# Patient Record
Sex: FEMALE | Race: WHITE
[De-identification: names, ages, dates, MRNs, and addresses within clinical notes are randomized per-mention and may not be internally consistent; named-entity substitution may affect disease eponyms.]

---

## 2017-12-17 ENCOUNTER — HOSPITAL ENCOUNTER (INPATIENT)
Dept: HOSPITAL 17 - NEPE | Age: 77
LOS: 11 days | Discharge: PSYCHIATRIC OR SUBSTANCE USE FACILITY | DRG: 853 | End: 2017-12-28
Attending: HOSPITALIST | Admitting: HOSPITALIST
Payer: MEDICARE

## 2017-12-17 VITALS — OXYGEN SATURATION: 95 %

## 2017-12-17 VITALS
TEMPERATURE: 98.2 F | RESPIRATION RATE: 18 BRPM | DIASTOLIC BLOOD PRESSURE: 93 MMHG | HEART RATE: 112 BPM | SYSTOLIC BLOOD PRESSURE: 147 MMHG | OXYGEN SATURATION: 97 %

## 2017-12-17 VITALS
DIASTOLIC BLOOD PRESSURE: 92 MMHG | SYSTOLIC BLOOD PRESSURE: 175 MMHG | OXYGEN SATURATION: 94 % | HEART RATE: 109 BPM | TEMPERATURE: 98.2 F | RESPIRATION RATE: 16 BRPM

## 2017-12-17 VITALS
HEART RATE: 90 BPM | RESPIRATION RATE: 18 BRPM | OXYGEN SATURATION: 96 % | DIASTOLIC BLOOD PRESSURE: 84 MMHG | SYSTOLIC BLOOD PRESSURE: 155 MMHG | TEMPERATURE: 97.1 F

## 2017-12-17 VITALS
DIASTOLIC BLOOD PRESSURE: 68 MMHG | OXYGEN SATURATION: 96 % | HEART RATE: 86 BPM | RESPIRATION RATE: 20 BRPM | SYSTOLIC BLOOD PRESSURE: 144 MMHG

## 2017-12-17 VITALS — WEIGHT: 121.92 LBS | HEIGHT: 65 IN | BODY MASS INDEX: 20.31 KG/M2

## 2017-12-17 VITALS
SYSTOLIC BLOOD PRESSURE: 140 MMHG | HEART RATE: 91 BPM | DIASTOLIC BLOOD PRESSURE: 95 MMHG | OXYGEN SATURATION: 99 % | TEMPERATURE: 98.3 F | RESPIRATION RATE: 17 BRPM

## 2017-12-17 VITALS — HEART RATE: 103 BPM

## 2017-12-17 DIAGNOSIS — R53.1: ICD-10-CM

## 2017-12-17 DIAGNOSIS — I25.5: ICD-10-CM

## 2017-12-17 DIAGNOSIS — J18.9: ICD-10-CM

## 2017-12-17 DIAGNOSIS — I70.8: ICD-10-CM

## 2017-12-17 DIAGNOSIS — N39.0: ICD-10-CM

## 2017-12-17 DIAGNOSIS — I48.91: ICD-10-CM

## 2017-12-17 DIAGNOSIS — R41.0: ICD-10-CM

## 2017-12-17 DIAGNOSIS — I70.203: ICD-10-CM

## 2017-12-17 DIAGNOSIS — I51.3: ICD-10-CM

## 2017-12-17 DIAGNOSIS — F54: ICD-10-CM

## 2017-12-17 DIAGNOSIS — E78.5: ICD-10-CM

## 2017-12-17 DIAGNOSIS — I74.5: ICD-10-CM

## 2017-12-17 DIAGNOSIS — I10: ICD-10-CM

## 2017-12-17 DIAGNOSIS — I70.0: ICD-10-CM

## 2017-12-17 DIAGNOSIS — I74.3: ICD-10-CM

## 2017-12-17 DIAGNOSIS — I25.10: ICD-10-CM

## 2017-12-17 DIAGNOSIS — Z87.891: ICD-10-CM

## 2017-12-17 DIAGNOSIS — I25.82: ICD-10-CM

## 2017-12-17 DIAGNOSIS — E88.09: ICD-10-CM

## 2017-12-17 DIAGNOSIS — E87.6: ICD-10-CM

## 2017-12-17 DIAGNOSIS — D75.1: ICD-10-CM

## 2017-12-17 DIAGNOSIS — I49.3: ICD-10-CM

## 2017-12-17 DIAGNOSIS — R26.81: ICD-10-CM

## 2017-12-17 DIAGNOSIS — Z86.73: ICD-10-CM

## 2017-12-17 DIAGNOSIS — B96.20: ICD-10-CM

## 2017-12-17 DIAGNOSIS — R09.02: ICD-10-CM

## 2017-12-17 DIAGNOSIS — A41.9: Primary | ICD-10-CM

## 2017-12-17 DIAGNOSIS — L98.8: ICD-10-CM

## 2017-12-17 DIAGNOSIS — E86.0: ICD-10-CM

## 2017-12-17 LAB
ALBUMIN SERPL-MCNC: 2.3 GM/DL (ref 3.4–5)
ALP SERPL-CCNC: 100 U/L (ref 45–117)
ALT SERPL-CCNC: 9 U/L (ref 10–53)
AMORPHOUS SEDIMENT, URINE: (no result)
AST SERPL-CCNC: 18 U/L (ref 15–37)
BACTERIA #/AREA URNS HPF: (no result) /HPF
BASOPHILS # BLD AUTO: 0 TH/MM3 (ref 0–0.2)
BASOPHILS NFR BLD: 0.1 % (ref 0–2)
BILIRUB SERPL-MCNC: 0.9 MG/DL (ref 0.2–1)
BUN SERPL-MCNC: 11 MG/DL (ref 7–18)
CALCIUM SERPL-MCNC: 8.1 MG/DL (ref 8.5–10.1)
CHLORIDE SERPL-SCNC: 102 MEQ/L (ref 98–107)
COLOR UR: (no result)
CREAT SERPL-MCNC: 0.73 MG/DL (ref 0.5–1)
EOSINOPHIL # BLD: 0.2 TH/MM3 (ref 0–0.4)
EOSINOPHIL NFR BLD: 0.9 % (ref 0–4)
ERYTHROCYTE [DISTWIDTH] IN BLOOD BY AUTOMATED COUNT: 18.8 % (ref 11.6–17.2)
GFR SERPLBLD BASED ON 1.73 SQ M-ARVRAT: 78 ML/MIN (ref 89–?)
GLUCOSE SERPL-MCNC: 88 MG/DL (ref 74–106)
GLUCOSE UR STRIP-MCNC: (no result) MG/DL
HCO3 BLD-SCNC: 27.9 MEQ/L (ref 21–32)
HCT VFR BLD CALC: 59.6 % (ref 35–46)
HGB BLD-MCNC: 19.3 GM/DL (ref 11.6–15.3)
HGB UR QL STRIP: (no result)
INR PPP: 1.7 RATIO
KETONES UR STRIP-MCNC: 10 MG/DL
LYMPHOCYTES # BLD AUTO: 0.7 TH/MM3 (ref 1–4.8)
LYMPHOCYTES NFR BLD AUTO: 3.1 % (ref 9–44)
MAGNESIUM SERPL-MCNC: 1.9 MG/DL (ref 1.5–2.5)
MCH RBC QN AUTO: 22.3 PG (ref 27–34)
MCHC RBC AUTO-ENTMCNC: 32.4 % (ref 32–36)
MCV RBC AUTO: 68.8 FL (ref 80–100)
MONOCYTE #: 1.2 TH/MM3 (ref 0–0.9)
MONOCYTES NFR BLD: 5.4 % (ref 0–8)
MUCOUS THREADS #/AREA URNS LPF: (no result) /LPF
NEUTROPHILS # BLD AUTO: 21 TH/MM3 (ref 1.8–7.7)
NEUTROPHILS NFR BLD AUTO: 90.5 % (ref 16–70)
NITRITE UR QL STRIP: (no result)
PLATELET # BLD: 675 TH/MM3 (ref 150–450)
PMV BLD AUTO: 8.2 FL (ref 7–11)
PROT SERPL-MCNC: 7.1 GM/DL (ref 6.4–8.2)
PROTHROMBIN TIME: 17.5 SEC (ref 9.8–11.6)
RBC # BLD AUTO: 8.36 MIL/MM3 (ref 4–5.3)
SODIUM SERPL-SCNC: 136 MEQ/L (ref 136–145)
SP GR UR STRIP: 1.02 (ref 1–1.03)
SQUAMOUS #/AREA URNS HPF: 2 /HPF (ref 0–5)
URINE LEUKOCYTE ESTERASE: (no result)
WBC # BLD AUTO: 23.2 TH/MM3 (ref 4–11)
WBC TOXIC VACUOLES BLD QL SMEAR: PRESENT

## 2017-12-17 PROCEDURE — 96361 HYDRATE IV INFUSION ADD-ON: CPT

## 2017-12-17 PROCEDURE — 71020: CPT

## 2017-12-17 PROCEDURE — 93005 ELECTROCARDIOGRAM TRACING: CPT

## 2017-12-17 PROCEDURE — 87040 BLOOD CULTURE FOR BACTERIA: CPT

## 2017-12-17 PROCEDURE — 78452 HT MUSCLE IMAGE SPECT MULT: CPT

## 2017-12-17 PROCEDURE — C1768 GRAFT, VASCULAR: HCPCS

## 2017-12-17 PROCEDURE — 86900 BLOOD TYPING SEROLOGIC ABO: CPT

## 2017-12-17 PROCEDURE — 84100 ASSAY OF PHOSPHORUS: CPT

## 2017-12-17 PROCEDURE — C1757 CATH, THROMBECTOMY/EMBOLECT: HCPCS

## 2017-12-17 PROCEDURE — 85007 BL SMEAR W/DIFF WBC COUNT: CPT

## 2017-12-17 PROCEDURE — 94668 MNPJ CHEST WALL SBSQ: CPT

## 2017-12-17 PROCEDURE — 99152 MOD SED SAME PHYS/QHP 5/>YRS: CPT

## 2017-12-17 PROCEDURE — 96365 THER/PROPH/DIAG IV INF INIT: CPT

## 2017-12-17 PROCEDURE — 85002 BLEEDING TIME TEST: CPT

## 2017-12-17 PROCEDURE — 75635 CT ANGIO ABDOMINAL ARTERIES: CPT

## 2017-12-17 PROCEDURE — 83735 ASSAY OF MAGNESIUM: CPT

## 2017-12-17 PROCEDURE — C1769 GUIDE WIRE: HCPCS

## 2017-12-17 PROCEDURE — 87186 SC STD MICRODIL/AGAR DIL: CPT

## 2017-12-17 PROCEDURE — 94664 DEMO&/EVAL PT USE INHALER: CPT

## 2017-12-17 PROCEDURE — 87086 URINE CULTURE/COLONY COUNT: CPT

## 2017-12-17 PROCEDURE — 80202 ASSAY OF VANCOMYCIN: CPT

## 2017-12-17 PROCEDURE — C1893 INTRO/SHEATH, FIXED,NON-PEEL: HCPCS

## 2017-12-17 PROCEDURE — 81001 URINALYSIS AUTO W/SCOPE: CPT

## 2017-12-17 PROCEDURE — 86920 COMPATIBILITY TEST SPIN: CPT

## 2017-12-17 PROCEDURE — 93970 EXTREMITY STUDY: CPT

## 2017-12-17 PROCEDURE — 93306 TTE W/DOPPLER COMPLETE: CPT

## 2017-12-17 PROCEDURE — 83880 ASSAY OF NATRIURETIC PEPTIDE: CPT

## 2017-12-17 PROCEDURE — 84439 ASSAY OF FREE THYROXINE: CPT

## 2017-12-17 PROCEDURE — 85027 COMPLETE CBC AUTOMATED: CPT

## 2017-12-17 PROCEDURE — 84132 ASSAY OF SERUM POTASSIUM: CPT

## 2017-12-17 PROCEDURE — 80061 LIPID PANEL: CPT

## 2017-12-17 PROCEDURE — 80048 BASIC METABOLIC PNL TOTAL CA: CPT

## 2017-12-17 PROCEDURE — 80053 COMPREHEN METABOLIC PANEL: CPT

## 2017-12-17 PROCEDURE — 83036 HEMOGLOBIN GLYCOSYLATED A1C: CPT

## 2017-12-17 PROCEDURE — 76937 US GUIDE VASCULAR ACCESS: CPT

## 2017-12-17 PROCEDURE — P9045 ALBUMIN (HUMAN), 5%, 250 ML: HCPCS

## 2017-12-17 PROCEDURE — 86850 RBC ANTIBODY SCREEN: CPT

## 2017-12-17 PROCEDURE — 94640 AIRWAY INHALATION TREATMENT: CPT

## 2017-12-17 PROCEDURE — 86901 BLOOD TYPING SEROLOGIC RH(D): CPT

## 2017-12-17 PROCEDURE — 93017 CV STRESS TEST TRACING ONLY: CPT

## 2017-12-17 PROCEDURE — 88311 DECALCIFY TISSUE: CPT

## 2017-12-17 PROCEDURE — 71010: CPT

## 2017-12-17 PROCEDURE — 70450 CT HEAD/BRAIN W/O DYE: CPT

## 2017-12-17 PROCEDURE — 85610 PROTHROMBIN TIME: CPT

## 2017-12-17 PROCEDURE — 82550 ASSAY OF CK (CPK): CPT

## 2017-12-17 PROCEDURE — 85025 COMPLETE CBC W/AUTO DIFF WBC: CPT

## 2017-12-17 PROCEDURE — A9502 TC99M TETROFOSMIN: HCPCS

## 2017-12-17 PROCEDURE — 96367 TX/PROPH/DG ADDL SEQ IV INF: CPT

## 2017-12-17 PROCEDURE — 83605 ASSAY OF LACTIC ACID: CPT

## 2017-12-17 PROCEDURE — 84443 ASSAY THYROID STIM HORMONE: CPT

## 2017-12-17 PROCEDURE — 93458 L HRT ARTERY/VENTRICLE ANGIO: CPT

## 2017-12-17 PROCEDURE — 87077 CULTURE AEROBIC IDENTIFY: CPT

## 2017-12-17 PROCEDURE — 87449 NOS EACH ORGANISM AG IA: CPT

## 2017-12-17 PROCEDURE — 88304 TISSUE EXAM BY PATHOLOGIST: CPT

## 2017-12-17 PROCEDURE — 85730 THROMBOPLASTIN TIME PARTIAL: CPT

## 2017-12-17 PROCEDURE — 82805 BLOOD GASES W/O2 SATURATION: CPT

## 2017-12-17 PROCEDURE — 36600 WITHDRAWAL OF ARTERIAL BLOOD: CPT

## 2017-12-17 RX ADMIN — Medication SCH ML: at 22:06

## 2017-12-17 RX ADMIN — STANDARDIZED SENNA CONCENTRATE AND DOCUSATE SODIUM SCH TAB: 8.6; 5 TABLET, FILM COATED ORAL at 22:05

## 2017-12-17 RX ADMIN — SODIUM CHLORIDE SCH MLS/HR: 900 INJECTION INTRAVENOUS at 17:25

## 2017-12-17 NOTE — HHI.HP
__________________________________________________





[facility]


Service


[facility]


Primary Care Physician


No Primary Care Physician


Admission Diagnosis





sepsis/uti


Diagnoses:  


Chief Complaint:  


generalized weakness


Travel History


International Travel<30 Days:  No


Contact w/Intl Traveler <30 Da:  No


Traveled to Known Affected Are:  No


History of Present Illness


Patient is 77-year-old female with PMH of HTN, HLD brought into the emergency 

department via EMS for evaluation of generalized weakness.  Patient is a poor 

historian. Per their report she has been laying on her couch for the last 7 

days unable to get up because she has been weak.  Apparently either patient or 

daughter called 911.  Patient states that she has not been cleaned in several 

days.  Patient states she has not been eating well.  EMS reported that living 

conditions were suboptimal at best.  Patient smelled of feces and urine on 

arrival. Further work up reveals UTI  with sepsis. Started on abb after 

cultures obtained. 


Patient also was noted with discolored LE bluish discoloration, cold LE , and 

no pulses detected by US. Ao run off ordered and also vascular surgeon 

consulted for further eval.





Review of Systems


ROS Limitations:  Clinical Condition, Poor Historian


Except as stated in HPI:  all other systems reviewed are Neg





Past Family Social History


Past Medical History


HLD, HTN


Past Surgical History


Tubal ligation 1982


Tonsillectomy as a child








Reported Medications





Reported Meds & Active Scripts


Active


Allergies:  


Coded Allergies:  


     No Known Allergies (Unverified  Allergy, Unknown, 17)


Family History


None


Social History


Tobacco: 1/2 ppd since 20 yrs of age, says she stopped in her "70's


Alcohol: 2 beers every couple weeks


Drugs: Marijuana in her 20's





Physical Exam


Vital Signs





Vital Signs








  Date Time  Temp Pulse Resp B/P (MAP) Pulse Ox O2 Delivery O2 Flow Rate FiO2


 


17 16:00 97.1 90 18 155/84 (107) 96   


 


17 15:49        


 


17 15:00  86 20 144/68 (93) 96 Room Air  


 


17 14:07 98.3 91 17 140/95 (110) 99 Room Air  


 


17 13:02     95 Room Air  


 


17 12:03     99 Room Air  


 


17 12:03  111 18  99 Room Air  


 


17 11:51 98.2 112 18 147/93 (111 97   








Physical Exam


GENERAL: This is a skinny 78 yo f,  well-nourished, well-developed patient, in 

no apparent distress.


SKIN: BL LE toes with cyanosis, cold and no pulses detected. Cool and dry.


HEAD: Atraumatic. Normocephalic. No temporal or scalp tenderness.


EYES: Pupils equal round and reactive. Extraocular motions intact. No scleral 

icterus. No injection or drainage. 


ENT: Nose without bleeding, purulent drainage or septal hematoma. Throat 

without erythema, tonsillar hypertrophy or exudate. Uvula midline. Airway 

patent.


NECK: Trachea midline. No JVD or lymphadenopathy. Supple, nontender, no 

meningeal signs.


CARDIOVASCULAR: Regular rate and rhythm without murmurs, gallops, or rubs. 


RESPIRATORY: Clear to auscultation. Breath sounds equal bilaterally. No wheezes

, rales, or rhonchi.  


GASTROINTESTINAL: Abdomen soft, non-tender, nondistended. No hepato-splenomegaly

, or palpable masses. No guarding.


MUSCULOSKELETAL: Extremities without clubbing, cyanosis, or edema. No joint 

tenderness, effusion, or edema noted. No calf tenderness. Negative Homans sign 

bilaterally.


NEUROLOGICAL: Awake and alert. Cranial nerves II through XII intact.  Motor and 

sensory grossly within normal limits. Five out of 5 muscle strength in all 

muscle groups.  Normal speech.


Laboratory





Laboratory Tests








Test


  17


12:00 17


12:05 17


13:20


 


Urine Color LIGHT-RED   


 


Urine Turbidity HAZY   


 


Urine pH 5.5   


 


Urine Specific Gravity 1.024   


 


Urine Protein 30   


 


Urine Glucose (UA) NEG   


 


Urine Ketones 10   


 


Urine Occult Blood NEG   


 


Urine Nitrite POS   


 


Urine Bilirubin NEG   


 


Urine Urobilinogen 2.0   


 


Urine Leukocyte Esterase MOD   


 


Urine RBC 3   


 


Urine WBC 21   


 


Urine Squamous Epithelial


Cells 2 


  


  


 


 


Urine Amorphous Sediment OCC   


 


Urine Bacteria MANY   


 


Urine Mucus MANY   


 


Microscopic Urinalysis Comment


  CATH-CULTURE


IND 


  


 


 


White Blood Count  23.2  


 


Red Blood Count  8.36  


 


Hemoglobin  19.3  


 


Hematocrit  59.6  


 


Mean Corpuscular Volume  68.8  


 


Mean Corpuscular Hemoglobin  22.3  


 


Mean Corpuscular Hemoglobin


Concent 


  32.4 


  


 


 


Red Cell Distribution Width  18.8  


 


Platelet Count  675  


 


Mean Platelet Volume  8.2  


 


Neutrophils (%) (Auto)  90.5  


 


Lymphocytes (%) (Auto)  3.1  


 


Monocytes (%) (Auto)  5.4  


 


Eosinophils (%) (Auto)  0.9  


 


Basophils (%) (Auto)  0.1  


 


Neutrophils # (Auto)  21.0  


 


Lymphocytes # (Auto)  0.7  


 


Monocytes # (Auto)  1.2  


 


Eosinophils # (Auto)  0.2  


 


Basophils # (Auto)  0.0  


 


CBC Comment  AUTO DIFF  


 


Differential Comment


  


  AUTO DIFF


CONFIRMED 


 


 


Toxic Vacuolation  PRESENT  


 


Platelet Estimate  HIGH  


 


Platelet Morphology Comment  ENLARGED  


 


Prothrombin Time  17.5  


 


Prothromb Time International


Ratio 


  1.7 


  


 


 


Activated Partial


Thromboplast Time 


  29.9 


  


 


 


Lactic Acid Level  1.3  


 


Blood Urea Nitrogen   11 


 


Creatinine   0.73 


 


Random Glucose   88 


 


Total Protein   7.1 


 


Albumin   2.3 


 


Calcium Level   8.1 


 


Magnesium Level   1.9 


 


Alkaline Phosphatase   100 


 


Aspartate Amino Transf


(AST/SGOT) 


  


  18 


 


 


Alanine Aminotransferase


(ALT/SGPT) 


  


  9 


 


 


Total Bilirubin   0.9 


 


Sodium Level   136 


 


Potassium Level   3.6 


 


Chloride Level   102 


 


Carbon Dioxide Level   27.9 


 


Anion Gap   6 


 


Estimat Glomerular Filtration


Rate 


  


  78 


 


 


Total Creatine Kinase   81 














 Date/Time


Source Procedure


Growth Status


 


 


 17 12:20


Blood Peripheral Aerobic Blood Culture


Pending Received


 


 17 12:20


Blood Peripheral Anaerobic Blood Culture


Pending Received


 


 17 12:00


Urine Catheterized Urine Urine Culture


Pending Received








Result Diagram:  


17 1205                                                                  

              17 1320





Imaging





Last Impressions








Chest X-Ray 17 1157 Signed





Impressions: 





 Service Date/Time:  2017 13:26 - CONCLUSION:  No acute 





 cardiopulmonary disease.     Tomas Doty MD 











Caprini VTE Risk Assessment


Caprini VTE Risk Assessment:  Mod/High Risk (score >= 2)


Caprini Risk Assessment Model











 Point Value = 1          Point Value = 2  Point Value = 3  Point Value = 5


 


Age 41-60


Minor surgery


BMI > 25 kg/m2


Swollen legs


Varicose veins


Pregnancy or postpartum


History of unexplained or recurrent


   spontaneous 


Oral contraceptives or hormone


   replacement


Sepsis (< 1 month)


Serious lung disease, including


   pneumonia (< 1 month)


Abnormal pulmonary function


Acute myocardial infarction


Congestive heart failure (< 1 month)


History of inflammatory bowel disease


Medical patient at bed rest Age 61-74


Arthroscopic surgery


Major open surgery (> 45 min)


Laparoscopic surgery (> 45 min)


Malignancy


Confined to bed (> 72 hours)


Immobilizing plaster cast


Central venous access Age >= 75


History of VTE


Family history of VTE


Factor V Leiden


Prothrombin 92636L


Lupus anticoagulant


Anticardiolipin antibodies


Elevated serum homocysteine


Heparin-induced thrombocytopenia


Other congenital or acquired


   thrombophilia Stroke (< 1 month)


Elective arthroplasty


Hip, pelvis, or leg fracture


Acute spinal cord injury (< 1 month)








Prophylaxis Regimen











   Total Risk


Factor Score Risk Level Prophylaxis Regimen


 


0-1      Low Early ambulation


 


2 Moderate Order ONE of the following:


*Sequential Compression Device (SCD)


*Heparin 5000 units SQ BID


 


3-4 Higher Order ONE of the following medications:


*Heparin 5000 units SQ TID


*Enoxaparin/Lovenox 40 mg SQ daily (WT < 150 kg, CrCl > 30 mL/min)


*Enoxaparin/Lovenox 30 mg SQ daily (WT < 150 kg, CrCl > 10-29 mL/min)


*Enoxaparin/Lovenox 30 mg SQ BID (WT < 150 kg, CrCl > 30 mL/min)


AND/OR


*Sequential Compression Device (SCD)


 


5 or more Highest Order ONE of the following medications:


*Heparin 5000 units SQ TID (Preferred with Epidurals)


*Enoxaparin/Lovenox 40 mg SQ daily (WT < 150 kg, CrCl > 30 mL/min)


*Enoxaparin/Lovenox 30 mg SQ daily (WT < 150 kg, CrCl > 10-29 mL/min)


*Enoxaparin/Lovenox 30 mg SQ BID (WT < 150 kg, CrCl > 30 mL/min)


AND


*Sequential Compression Device (SCD)











Assessment and Plan


Assessment and Plan


Sepsis ( UTI, tachycardia, leukocytosis )


UTI (urinary tract infection)


Generalized weakness


Dehydration 


Total self-care deficit


Bilateral LE discoloration and unable to feel DP pulses. Patient with pain and 

cold extremities. Will do CTA Ao run off and will consult vascular surgery 


Started on antibiotic Rocephin IV . Received vanco and zosyn in the ED. 

Received bolus of NS in the eD. Continue IVF. Monitor VS closely. 


Urine cultures blood cx are pending 


Initial EKG shows sinus tachycardia with occasional PVCs.  Rate is 111, this 

was reviewed by my attending physician.


Chest x-ray shows no acute disease


Restart home meds as appropriate patient says she currently doesn't take any 

meds. 


Repeat labs cbc, bmp tomorrow 


Consult PT for eval


Consult case management for DC plan 


DVT ppx lovenox


Discussed Condition With


patient. nurse, ED physician /PA





Physician Certification


2 Midnight Certification Type:  Admission for Inpatient Services


Order for Inpatient Services


The services are ordered in accordance with Medicare regulations or non-

Medicare payer requirements, as applicable.  In the case of services not 

specified as inpatient-only, they are appropriately provided as inpatient 

services in accordance with the 2-midnight benchmark.


Estimated LOS (days):  3


 days is the estimated time the patient will need to remain in the hospital, 

assuming treatment plan goals are met and no additional complications.


Post-Hospital Plan:  Not yet determined











Camelia Boykin MD Dec 17, 2017 16:49

## 2017-12-17 NOTE — RADRPT
EXAM DATE/TIME:  12/17/2017 17:47 

 

[facility] COMPARISON:     

No previous studies available for comparison.

 

 

INDICATIONS :     

Right leg pain; evaluate for occlusion.

                      

 

IV CONTRAST:     

100 cc Omnipaque 350 (iohexol) IV 

 

 

RADIATION DOSE:     

2.3 CTDIvol (mGy) 

 

 

MEDICAL HISTORY :     

Cerebrovascular disease.  

 

SURGICAL HISTORY :      

Tubal ligation. 

 

ENCOUNTER:     

Initial

 

ACUITY:     

1 month

 

PAIN SCALE:     

7/10

 

LOCATION:      

Right lower leg

 

TECHNIQUE:     

Volumetric scanning was performed using a multi-row detector CT scanner.  The data was post processed
 with a variety of visualization algorithms including full volume maximum intensity projection, multi
-planar sliding thin slab reformation, curved planar reformation, and surface rendering techniques.  
Using automated exposure control and adjustment of the mA and/or kV according to patient size, radiat
ion dose was kept as low as reasonably achievable to obtain optimal diagnostic quality images.   DICO
M format image data is available electronically for review and comparison.  

 

FINDINGS:     

There are atherosclerotic changes seen throughout the arterial system.  The abdominal aorta measures 
up to 2.9 cm.  At the distal infrarenal abdominal aorta thrombus occupies more than half of the lumen
.  The functional lumen is seen at the left lateral aspect of the distal abdominal aorta.  There is a
therosclerotic change at the common iliac arteries bilaterally.  A significant stenosis is not seen a
t this level is not seen.  There does appear to be severe stenosis and possible occlusion at the orig
in of the right internal iliac artery.  There is mild plaque seen at the right external iliac artery.
  There is a severe stenosis at the distal left external iliac artery.

 

There is atherosclerotic change seen at the common femoral arteries bilaterally being more severe on 
the right.  There is a severe stenosis at the right common femoral artery narrowing the lumen by appr
oximately 80%.  There is severe narrowing of the proximal right profunda femoris artery.  There is ab
rupt occlusion of the proximal right superficial femoral artery.  Flow in the thigh is continues thro
ugh collaterals in the profunda femoris artery distribution.  There is reconstitution of the distal p
opliteal artery at the level of the distal femur via collaterals.  There is a normal trifurcation.  T
he right anterior tibial artery can be traced to the ankle.  The posterior tibial and peritoneal maegan
gloria can be seen to the distal lower leg but can not clearly be traced into the foot.  

 

Again noted is the mild stenosis at the left common femoral artery.  There is occlusion of the proxim
al left superficial femoral artery.  The popliteal artery is patent throughout.  There is reconstitut
ion of the distal superficial femoral artery at the level of the distal femoral shaft.  The popliteal
 artery is patent.  There is a severe stenosis at the mid popliteal artery.  The lumen is narrowed by
 over 75%.  The more distal popliteal artery is patent.  The anterior and posterior tibial artery cou
ld be traced to the foot.  The peritoneal artery can be traced to the ankle region.  

 

Atherosclerotic calcifications are seen throughout the origins of the vessels in the upper abdomen.  
There does appear to be at least a moderate stenosis at the origin of the right renal artery.  The le
ft renal artery appears patent.  The celiac, SMA and DMITRI are patent.  

 

There is a mild hiatal hernia.  The liver, spleen, pancreas and kidneys appear grossly normal.  The a
drenal glands are grossly normal.  The pelvic structures appear intact.  The patient does have a Fole
y catheter in the urinary bladder.  There is degenerative change in the lumbar spine.  

 

CONCLUSION:

1. Atherosclerotic changes seen throughout the arterial system, including borderline aneurysmal dilat
ion of the infrarenal abdominal aorta with prominent mural thrombus.

2. Atherosclerotic change in the external iliac and common femoral arteries bilaterally as described 
above. 

3. Occlusion of the superficial femoral arteries bilaterally with reconstitution distally.  The recon
stitution is higher on the left side. 

4. Normal trifurcation vessels seen on the left side.

5. Diminished flow seen at the right trifurcation vessels.  The vessels can only be well seen on the 
delayed images.  The vessels can not be traced into the foot.      

 

 

 

 Emir Sorensen MD on December 17, 2017 at 19:28                

Board Certified Radiologist.

 This report was verified electronically.

## 2017-12-17 NOTE — PD
Data


Data


Last Documented VS





Vital Signs








  Date Time  Temp Pulse Resp B/P (MAP) Pulse Ox O2 Delivery O2 Flow Rate FiO2


 


12/17/17 14:07 98.3 91 17 140/95 (110) 99 Room Air  








Orders





 Orders


Sepsis Workup Initiated (12/17/17 )


Electrocardiogram (12/17/17 11:57)


Complete Blood Count With Diff (12/17/17 11:57)


Comprehensive Metabolic Panel (12/17/17 11:57)


Prothrombin Time / Inr (Pt) (12/17/17 11:57)


Act Partial Throm Time (Ptt) (12/17/17 11:57)


Lactic Acid Sepsis Protocol (12/17/17 11:57)


Magnesium (Mg) (12/17/17 11:57)


Urinalysis - C+S If Indicated (12/17/17 11:57)


Blood Culture (12/17/17 11:57)


Chest, Single Ap (12/17/17 11:57)


Blood Glucose (12/17/17 11:57)


Ecg Monitoring (12/17/17 11:57)


Iv Access Insert/Monitor (12/17/17 11:57)


Oximetry (12/17/17 11:57)


Oxygen Administration (12/17/17 11:57)


Sodium Chlor 0.9% 1000 Ml Inj (Ns 1000 M (12/17/17 11:57)


Sodium Chlor 0.9% 1000 Ml Inj (Ns 1000 M (12/17/17 11:57)


Urine Culture (12/17/17 12:00)


Piperacil-Tazo 4.5 Gm Premix (Zosyn 4.5 (12/17/17 12:52)


Vancomycin Inj (Vancomycin Inj) (12/17/17 13:15)





Labs





Laboratory Tests








Test


  12/17/17


12:00 12/17/17


12:05 12/17/17


13:20


 


Urine Color LIGHT-RED   


 


Urine Turbidity HAZY   


 


Urine pH 5.5   


 


Urine Specific Gravity 1.024   


 


Urine Protein 30 mg/dL   


 


Urine Glucose (UA) NEG mg/dL   


 


Urine Ketones 10 mg/dL   


 


Urine Occult Blood NEG   


 


Urine Nitrite POS   


 


Urine Bilirubin NEG   


 


Urine Urobilinogen 2.0 MG/DL   


 


Urine Leukocyte Esterase MOD   


 


Urine RBC 3 /hpf   


 


Urine WBC 21 /hpf   


 


Urine Squamous Epithelial


Cells 2 /hpf 


  


  


 


 


Urine Amorphous Sediment OCC   


 


Urine Bacteria MANY /hpf   


 


Urine Mucus MANY /lpf   


 


Microscopic Urinalysis Comment


  CATH-CULTURE


IND 


  


 


 


White Blood Count  23.2 TH/MM3  


 


Red Blood Count  8.36 MIL/MM3  


 


Hemoglobin  19.3 GM/DL  


 


Hematocrit  59.6 %  


 


Mean Corpuscular Volume  68.8 FL  


 


Mean Corpuscular Hemoglobin  22.3 PG  


 


Mean Corpuscular Hemoglobin


Concent 


  32.4 % 


  


 


 


Red Cell Distribution Width  18.8 %  


 


Platelet Count  675 TH/MM3  


 


Mean Platelet Volume  8.2 FL  


 


Neutrophils (%) (Auto)  90.5 %  


 


Lymphocytes (%) (Auto)  3.1 %  


 


Monocytes (%) (Auto)  5.4 %  


 


Eosinophils (%) (Auto)  0.9 %  


 


Basophils (%) (Auto)  0.1 %  


 


Neutrophils # (Auto)  21.0 TH/MM3  


 


Lymphocytes # (Auto)  0.7 TH/MM3  


 


Monocytes # (Auto)  1.2 TH/MM3  


 


Eosinophils # (Auto)  0.2 TH/MM3  


 


Basophils # (Auto)  0.0 TH/MM3  


 


CBC Comment  AUTO DIFF  


 


Differential Comment


  


  AUTO DIFF


CONFIRMED 


 


 


Toxic Vacuolation  PRESENT  


 


Platelet Estimate  HIGH  


 


Platelet Morphology Comment  ENLARGED  


 


Prothrombin Time  17.5 SEC  


 


Prothromb Time International


Ratio 


  1.7 RATIO 


  


 


 


Activated Partial


Thromboplast Time 


  29.9 SEC 


  


 


 


Lactic Acid Level  1.3 mmol/L  


 


Blood Urea Nitrogen   11 MG/DL 


 


Creatinine   0.73 MG/DL 


 


Random Glucose   88 MG/DL 


 


Total Protein   7.1 GM/DL 


 


Albumin   2.3 GM/DL 


 


Calcium Level   8.1 MG/DL 


 


Magnesium Level   1.9 MG/DL 


 


Alkaline Phosphatase   100 U/L 


 


Aspartate Amino Transf


(AST/SGOT) 


  


  18 U/L 


 


 


Alanine Aminotransferase


(ALT/SGPT) 


  


  9 U/L 


 


 


Total Bilirubin   0.9 MG/DL 


 


Sodium Level   136 MEQ/L 


 


Potassium Level   3.6 MEQ/L 


 


Chloride Level   102 MEQ/L 


 


Carbon Dioxide Level   27.9 MEQ/L 


 


Anion Gap   6 MEQ/L 


 


Estimat Glomerular Filtration


Rate 


  


  78 ML/MIN 


 











[facility]


Supervised Visit with JOHNNY:  Yes


Narrative Course


The history, exam, and medical decision-making in the associated mid-level 

provider note were completed with my assistance. I reviewed and agree with the 

findings presented.  I attest that I had a face-to-face encounter with the 

patient on the same day, and personally performed and documented my assessment 

and findings in the medical record. 





*My assessment and Findings:





 77-year-old woman with worsening weakness for the past week, hasn't moved off 

the couch, family called EMS.  EMS is worried about the living conditions.  

Patient was found with skin wounds in the posterior on her posterior with caked 

feces there is well.  Labs are remarkable for leukocytosis.  Chemistries are 

unremarkable.  UA shows pyuria.  She appeared dehydrated.  Met sepsis criteria.

  Was treated with flank and Zosyn.  She is a little bit confused.  No other 

evidence of trauma.  We'll add a total CK.











Dejuan Nash MD Dec 17, 2017 14:43

## 2017-12-17 NOTE — PD
HPI


Chief Complaint:  General Weakness


Time Seen by Provider:  11:57


Travel History


International Travel<30 days:  No


Contact w/Intl Traveler<30days:  No


Traveled to known affect area:  No





History of Present Illness


HPI


Patient is 77-year-old female brought into the emergency department via EMS for 

evaluation of generalized weakness.  Per their report she has been laying on 

her couch for the last 7 days unable to get up because she has been weak.  

Apparently either patient or daughter called 911.  Patient states that she has 

not been cleaned in several days.  Patient denies any pain at this time.  

Patient states she has not been eating well.  EMS reported that living 

conditions were suboptimal at best.  Patient smelled of feces and urine on 

arrival.





PFSH


Past Medical History


Cerebrovascular Accident:  Yes


Tubal Ligation:  Yes





Past Surgical History


Tonsillectomy:  Yes





Social History


Alcohol Use:  No


Tobacco Use:  No (quit several years ago)


Substance Use:  No





Allergies-Medications


(Allergen,Severity, Reaction):  


Coded Allergies:  


     No Known Allergies (Unverified  Allergy, Unknown, 12/17/17)


Reported Meds & Prescriptions





Reported Meds & Active Scripts


Active


Reported


Aspirin 325 Mg Tab (Aspirin) 325 Mg Tab 325 Mg PO DAILY 


Pravachol 40 Mg Tab (Pravastatin Sodium) 40 Mg Tab 40 Mg PO DAILY 


Microzide 12.5 Mg Cap (Hydrochlorothiazide) 12.5 Mg Cap 25 Mg PO DAILY 








Review of Systems


ROS Limitations:  Poor Historian


Except as stated in HPI:  all other systems reviewed are Neg


General / Constitutional:  Positive: Other (decreased oral intake), No: Fever, 

Chills


HENT:  No: Headaches


Cardiovascular:  No: Chest Pain or Discomfort


Respiratory:  No: Shortness of Breath


Gastrointestinal:  No: Nausea, Vomiting, Abdominal Pain


Skin:  Positive Other (skin breakdown to buttocks)


Neurologic:  Positive: Weakness





Physical Exam


Narrative


GENERAL: Thin, disheveled, cachectic elderly  female.  Resting in no 

acute distress.


SKIN: Purple discoloration to right toes, skin break down noted to the left 

buttock and right buttock.


HEAD: Atraumatic. Normocephalic. 


EYES: Pupils equal and round. No scleral icterus. No injection or drainage. 


ENT: No nasal bleeding or discharge.  Mucous membranes pink and moist.


NECK: Trachea midline. No JVD. 


CARDIOVASCULAR: Regular rate and rhythm.  


RESPIRATORY: No accessory muscle use. Clear to auscultation. Breath sounds 

equal bilaterally. 


GASTROINTESTINAL: Abdomen soft, non-tender, nondistended. Hepatic and splenic 

margins not palpable. 


MUSCULOSKELETAL: Extremities without clubbing, cyanosis, or edema. No obvious 

deformities. 


NEUROLOGICAL: Awake and alert, oriented to self and place. No obvious cranial 

nerve deficits.  Motor grossly within normal limits. Five out of 5 muscle 

strength in the arms and legs.  Normal speech.


PSYCHIATRIC: Appropriate mood and affect; insight and judgment normal.





Data


Data


Last Documented VS





Vital Signs








  Date Time  Temp Pulse Resp B/P (MAP) Pulse Ox O2 Delivery O2 Flow Rate FiO2


 


12/17/17 14:07 98.3 91 17 140/95 (110) 99 Room Air  








Orders





 Orders


Sepsis Workup Initiated (12/17/17 )


Electrocardiogram (12/17/17 11:57)


Complete Blood Count With Diff (12/17/17 11:57)


Comprehensive Metabolic Panel (12/17/17 11:57)


Prothrombin Time / Inr (Pt) (12/17/17 11:57)


Act Partial Throm Time (Ptt) (12/17/17 11:57)


Lactic Acid Sepsis Protocol (12/17/17 11:57)


Magnesium (Mg) (12/17/17 11:57)


Urinalysis - C+S If Indicated (12/17/17 11:57)


Blood Culture (12/17/17 11:57)


Chest, Single Ap (12/17/17 11:57)


Blood Glucose (12/17/17 11:57)


Ecg Monitoring (12/17/17 11:57)


Iv Access Insert/Monitor (12/17/17 11:57)


Oximetry (12/17/17 11:57)


Oxygen Administration (12/17/17 11:57)


Sodium Chlor 0.9% 1000 Ml Inj (Ns 1000 M (12/17/17 11:57)


Sodium Chlor 0.9% 1000 Ml Inj (Ns 1000 M (12/17/17 11:57)


Urine Culture (12/17/17 12:00)


Piperacil-Tazo 4.5 Gm Premix (Zosyn 4.5 (12/17/17 12:52)


Vancomycin Inj (Vancomycin Inj) (12/17/17 13:15)


Creatine Kinase (Cpk) (12/17/17 14:42)


Admit Order (Ed Use Only) (12/17/17 14:42)


Cardiac Monitor / Telemetry ESTEPHANIA.Q8H (12/17/17 14:42)


Vital Signs (Adult) Q4H (12/17/17 14:42)


Activity Bed Rest (12/17/17 14:42)


Notify Dr: Other (12/17/17 14:42)


Diet Regular Basic (12/17/17 Dinner)





Labs





Laboratory Tests








Test


  12/17/17


12:00 12/17/17


12:05 12/17/17


13:20


 


Urine Color LIGHT-RED   


 


Urine Turbidity HAZY   


 


Urine pH 5.5   


 


Urine Specific Gravity 1.024   


 


Urine Protein 30 mg/dL   


 


Urine Glucose (UA) NEG mg/dL   


 


Urine Ketones 10 mg/dL   


 


Urine Occult Blood NEG   


 


Urine Nitrite POS   


 


Urine Bilirubin NEG   


 


Urine Urobilinogen 2.0 MG/DL   


 


Urine Leukocyte Esterase MOD   


 


Urine RBC 3 /hpf   


 


Urine WBC 21 /hpf   


 


Urine Squamous Epithelial


Cells 2 /hpf 


  


  


 


 


Urine Amorphous Sediment OCC   


 


Urine Bacteria MANY /hpf   


 


Urine Mucus MANY /lpf   


 


Microscopic Urinalysis Comment


  CATH-CULTURE


IND 


  


 


 


White Blood Count  23.2 TH/MM3  


 


Red Blood Count  8.36 MIL/MM3  


 


Hemoglobin  19.3 GM/DL  


 


Hematocrit  59.6 %  


 


Mean Corpuscular Volume  68.8 FL  


 


Mean Corpuscular Hemoglobin  22.3 PG  


 


Mean Corpuscular Hemoglobin


Concent 


  32.4 % 


  


 


 


Red Cell Distribution Width  18.8 %  


 


Platelet Count  675 TH/MM3  


 


Mean Platelet Volume  8.2 FL  


 


Neutrophils (%) (Auto)  90.5 %  


 


Lymphocytes (%) (Auto)  3.1 %  


 


Monocytes (%) (Auto)  5.4 %  


 


Eosinophils (%) (Auto)  0.9 %  


 


Basophils (%) (Auto)  0.1 %  


 


Neutrophils # (Auto)  21.0 TH/MM3  


 


Lymphocytes # (Auto)  0.7 TH/MM3  


 


Monocytes # (Auto)  1.2 TH/MM3  


 


Eosinophils # (Auto)  0.2 TH/MM3  


 


Basophils # (Auto)  0.0 TH/MM3  


 


CBC Comment  AUTO DIFF  


 


Differential Comment


  


  AUTO DIFF


CONFIRMED 


 


 


Toxic Vacuolation  PRESENT  


 


Platelet Estimate  HIGH  


 


Platelet Morphology Comment  ENLARGED  


 


Prothrombin Time  17.5 SEC  


 


Prothromb Time International


Ratio 


  1.7 RATIO 


  


 


 


Activated Partial


Thromboplast Time 


  29.9 SEC 


  


 


 


Lactic Acid Level  1.3 mmol/L  


 


Blood Urea Nitrogen   11 MG/DL 


 


Creatinine   0.73 MG/DL 


 


Random Glucose   88 MG/DL 


 


Total Protein   7.1 GM/DL 


 


Albumin   2.3 GM/DL 


 


Calcium Level   8.1 MG/DL 


 


Magnesium Level   1.9 MG/DL 


 


Alkaline Phosphatase   100 U/L 


 


Aspartate Amino Transf


(AST/SGOT) 


  


  18 U/L 


 


 


Alanine Aminotransferase


(ALT/SGPT) 


  


  9 U/L 


 


 


Total Bilirubin   0.9 MG/DL 


 


Sodium Level   136 MEQ/L 


 


Potassium Level   3.6 MEQ/L 


 


Chloride Level   102 MEQ/L 


 


Carbon Dioxide Level   27.9 MEQ/L 


 


Anion Gap   6 MEQ/L 


 


Estimat Glomerular Filtration


Rate 


  


  78 ML/MIN 


 











MDM


Medical Decision Making


Medical Screen Exam Complete:  Yes


Emergency Medical Condition:  Yes


Medical Record Reviewed:  Yes


Interpretation(s)





Last Impressions








Chest X-Ray 12/17/17 1157 Signed





Impressions: 





 Service Date/Time:  Sunday, December 17, 2017 13:26 - CONCLUSION:  No acute 





 cardiopulmonary disease.     Tomas Doty MD 








Laboratory Tests








Test


  12/17/17


12:00 12/17/17


12:05 12/17/17


13:20


 


Urine Color LIGHT-RED   


 


Urine Turbidity HAZY   


 


Urine pH 5.5   


 


Urine Specific Gravity 1.024   


 


Urine Protein 30 mg/dL   


 


Urine Glucose (UA) NEG mg/dL   


 


Urine Ketones 10 mg/dL   


 


Urine Occult Blood NEG   


 


Urine Nitrite POS   


 


Urine Bilirubin NEG   


 


Urine Urobilinogen 2.0 MG/DL   


 


Urine Leukocyte Esterase MOD   


 


Urine RBC 3 /hpf   


 


Urine WBC 21 /hpf   


 


Urine Squamous Epithelial


Cells 2 /hpf 


  


  


 


 


Urine Amorphous Sediment OCC   


 


Urine Bacteria MANY /hpf   


 


Urine Mucus MANY /lpf   


 


Microscopic Urinalysis Comment


  CATH-CULTURE


IND 


  


 


 


White Blood Count  23.2 TH/MM3  


 


Red Blood Count  8.36 MIL/MM3  


 


Hemoglobin  19.3 GM/DL  


 


Hematocrit  59.6 %  


 


Mean Corpuscular Volume  68.8 FL  


 


Mean Corpuscular Hemoglobin  22.3 PG  


 


Mean Corpuscular Hemoglobin


Concent 


  32.4 % 


  


 


 


Red Cell Distribution Width  18.8 %  


 


Platelet Count  675 TH/MM3  


 


Mean Platelet Volume  8.2 FL  


 


Neutrophils (%) (Auto)  90.5 %  


 


Lymphocytes (%) (Auto)  3.1 %  


 


Monocytes (%) (Auto)  5.4 %  


 


Eosinophils (%) (Auto)  0.9 %  


 


Basophils (%) (Auto)  0.1 %  


 


Neutrophils # (Auto)  21.0 TH/MM3  


 


Lymphocytes # (Auto)  0.7 TH/MM3  


 


Monocytes # (Auto)  1.2 TH/MM3  


 


Eosinophils # (Auto)  0.2 TH/MM3  


 


Basophils # (Auto)  0.0 TH/MM3  


 


CBC Comment  AUTO DIFF  


 


Differential Comment


  


  AUTO DIFF


CONFIRMED 


 


 


Toxic Vacuolation  PRESENT  


 


Platelet Estimate  HIGH  


 


Platelet Morphology Comment  ENLARGED  


 


Prothrombin Time  17.5 SEC  


 


Prothromb Time International


Ratio 


  1.7 RATIO 


  


 


 


Activated Partial


Thromboplast Time 


  29.9 SEC 


  


 


 


Lactic Acid Level  1.3 mmol/L  


 


Blood Urea Nitrogen   11 MG/DL 


 


Creatinine   0.73 MG/DL 


 


Random Glucose   88 MG/DL 


 


Total Protein   7.1 GM/DL 


 


Albumin   2.3 GM/DL 


 


Calcium Level   8.1 MG/DL 


 


Magnesium Level   1.9 MG/DL 


 


Alkaline Phosphatase   100 U/L 


 


Aspartate Amino Transf


(AST/SGOT) 


  


  18 U/L 


 


 


Alanine Aminotransferase


(ALT/SGPT) 


  


  9 U/L 


 


 


Total Bilirubin   0.9 MG/DL 


 


Sodium Level   136 MEQ/L 


 


Potassium Level   3.6 MEQ/L 


 


Chloride Level   102 MEQ/L 


 


Carbon Dioxide Level   27.9 MEQ/L 


 


Anion Gap   6 MEQ/L 


 


Estimat Glomerular Filtration


Rate 


  


  78 ML/MIN 


 








Vital Signs








  Date Time  Temp Pulse Resp B/P (MAP) Pulse Ox O2 Delivery O2 Flow Rate FiO2


 


12/17/17 14:07 98.3 91 17 140/95 (110) 99 Room Air  


 


12/17/17 13:02     95 Room Air  


 


12/17/17 12:03     99 Room Air  


 


12/17/17 12:03  111 18  99 Room Air  


 


12/17/17 11:51 98.2 112 18 147/93 (111) 97   








Vital Signs








  Date Time  Temp Pulse Resp B/P (MAP) Pulse Ox O2 Delivery O2 Flow Rate FiO2


 


12/17/17 11:51 98.2 112 18 147/93 (111) 97   








Differential Diagnosis


Sepsis versus UTI versus metabolic abnormality versus neglect versus other


Narrative Course


Patient presented via EMS for evaluation of generalized weakness that is 

ongoing for at least the last 7 days.  Patient is given bedbound, unable to get 

up.  On arrival she was considerably soiled, stool in her adult diaper was 

caked to patient's skin.  There is skin breakdown noted to her buttocks.  She 

is tachycardic, likely secondary to dehydration however sepsis workup was 

initiated.  Patient's heart rate is 116 vital signs are otherwise stable at 

this time.  Patient has not been to [facility] since 2012, medical records 

reviewed.


Initial EKG shows sinus tachycardia with occasional PVCs.  Rate is 111, this 

was reviewed by my attending physician.


Chest x-ray shows no acute disease


CBC with a white count of 23.2 with left shift.  H&H is 19.3/59.6


Lactic acid 1.3


Urinalysis is consistent with a urinary tract infection.  Zosyn and vancomycin 

ordered.  Reflex culture is pending.


Coags reviewed, INR 1.7, PT 17.5


Patient meet sepsis criteria, patient will be admitted. Discussed with Dr. Boykin who accepted admission. Orders placed.





Sepsis Criteria


SIRS Criteria (2 or more):  Heart rate over 90, WBC > 43366, < 4000 or > 10% 

bands


Sepsis Criteria (SIRS+source):  Infect source susp/known





Diagnosis





 Primary Impression:  


 Sepsis


 Qualified Codes:  A41.9 - Sepsis, unspecified organism


 Additional Impressions:  


 UTI (urinary tract infection)


 Qualified Codes:  N39.0 - Urinary tract infection, site not specified; R31.9 - 

Hematuria, unspecified


 Generalized weakness


 Total self-care deficit





Admitting Information


Admitting Physician Requests:  Admit


Condition:  Stable











Candace Zimmerman Dec 17, 2017 12:09

## 2017-12-17 NOTE — RADRPT
EXAM DATE/TIME:  12/17/2017 20:48 

 

[facility] COMPARISON:     

No previous studies available for comparison.

        

 

 

INDICATIONS :                

Bilateral leg pain. 

            

 

MEDICAL HISTORY :     

Stroke.   

 

SURGICAL HISTORY :     

Tonsillectomy.Tubal ligation. 

 

ENCOUNTER:     

Initial

 

ACUITY:     

1 week

 

PAIN SCORE:      

3/10

 

LOCATION:      

Bilateral  legs. 

                       

 

TECHNIQUE:     

Venous ultrasound of the left and right leg was performed from the inguinal ligament to the proximal 
calf.  Real-time, color Doppler and spectral tracing, compression and augmentation techniques were us
ed.  

 

FINDINGS:     

 

RIGHT LEG:     

There is normal compressibility of the deep venous system from the inguinal region to the proximal ca
lf.  No echogenic clot is seen in the lumen of the common femoral, femoral, popliteal, and posterior 
tibial veins.  There is a normal response of the venous system to proximal and distal augmentation an
d respiration.  

 

LEFT LEG:     

There is normal compressibility of the deep venous system from the inguinal region to the proximal ca
lf.  No echogenic clot is seen in the lumen of the common femoral, femoral, popliteal, and posterior 
tibial veins.  There is a normal response of the venous system to proximal and distal augmentation an
d respiration.  

 

CONCLUSION:     No DVT.

 

 

 

 Emir Sorensen MD on December 17, 2017 at 21:23           

Board Certified Radiologist.

 This report was verified electronically.

## 2017-12-17 NOTE — RADRPT
EXAM DATE/TIME:  12/17/2017 13:26 

 

[facility] COMPARISON:     

No previous studies available for comparison.

 

                     

INDICATIONS :     

Cough, weakness. 

                     

 

MEDICAL HISTORY :            

Unobtainable   

 

SURGICAL HISTORY :     

None.   

 

ENCOUNTER:     

Initial                                        

 

ACUITY:     

1 day      

 

PAIN SCORE:     

0/10

 

LOCATION:     

Bilateral chest 

 

FINDINGS:     

The heart and mediastinal structures are normal. The pulmonary vascular pattern is normal. The lungs 
are clear.

 

CONCLUSION:     

No acute cardiopulmonary disease. 

 

 

 Tomas Doty MD on December 17, 2017 at 13:51           

Board Certified Radiologist.

 This report was verified electronically.

## 2017-12-18 VITALS
OXYGEN SATURATION: 92 % | TEMPERATURE: 98.8 F | DIASTOLIC BLOOD PRESSURE: 60 MMHG | HEART RATE: 100 BPM | RESPIRATION RATE: 18 BRPM | SYSTOLIC BLOOD PRESSURE: 124 MMHG

## 2017-12-18 VITALS
RESPIRATION RATE: 18 BRPM | SYSTOLIC BLOOD PRESSURE: 133 MMHG | OXYGEN SATURATION: 93 % | DIASTOLIC BLOOD PRESSURE: 64 MMHG | TEMPERATURE: 97.5 F | HEART RATE: 108 BPM

## 2017-12-18 VITALS — DIASTOLIC BLOOD PRESSURE: 86 MMHG | HEART RATE: 107 BPM | SYSTOLIC BLOOD PRESSURE: 158 MMHG

## 2017-12-18 VITALS
RESPIRATION RATE: 16 BRPM | OXYGEN SATURATION: 94 % | SYSTOLIC BLOOD PRESSURE: 119 MMHG | TEMPERATURE: 97.4 F | HEART RATE: 92 BPM | DIASTOLIC BLOOD PRESSURE: 64 MMHG

## 2017-12-18 VITALS
RESPIRATION RATE: 17 BRPM | SYSTOLIC BLOOD PRESSURE: 110 MMHG | OXYGEN SATURATION: 92 % | HEART RATE: 84 BPM | DIASTOLIC BLOOD PRESSURE: 59 MMHG | TEMPERATURE: 98.4 F

## 2017-12-18 VITALS
HEART RATE: 108 BPM | TEMPERATURE: 98.8 F | DIASTOLIC BLOOD PRESSURE: 88 MMHG | RESPIRATION RATE: 18 BRPM | OXYGEN SATURATION: 92 % | SYSTOLIC BLOOD PRESSURE: 161 MMHG

## 2017-12-18 VITALS
RESPIRATION RATE: 21 BRPM | DIASTOLIC BLOOD PRESSURE: 98 MMHG | SYSTOLIC BLOOD PRESSURE: 158 MMHG | OXYGEN SATURATION: 93 % | HEART RATE: 123 BPM

## 2017-12-18 LAB
BASOPHILS # BLD AUTO: 0 TH/MM3 (ref 0–0.2)
BASOPHILS NFR BLD: 0.1 % (ref 0–2)
BUN SERPL-MCNC: 6 MG/DL (ref 7–18)
CALCIUM SERPL-MCNC: 8.5 MG/DL (ref 8.5–10.1)
CHLORIDE SERPL-SCNC: 103 MEQ/L (ref 98–107)
CREAT SERPL-MCNC: 0.62 MG/DL (ref 0.5–1)
EOSINOPHIL # BLD: 0.2 TH/MM3 (ref 0–0.4)
EOSINOPHIL NFR BLD: 0.8 % (ref 0–4)
ERYTHROCYTE [DISTWIDTH] IN BLOOD BY AUTOMATED COUNT: 18.4 % (ref 11.6–17.2)
GFR SERPLBLD BASED ON 1.73 SQ M-ARVRAT: 94 ML/MIN (ref 89–?)
GLUCOSE SERPL-MCNC: 104 MG/DL (ref 74–106)
HCO3 BLD-SCNC: 21 MEQ/L (ref 21–32)
HCT VFR BLD CALC: 56.4 % (ref 35–46)
HGB BLD-MCNC: 18.2 GM/DL (ref 11.6–15.3)
INR PPP: 1.5 RATIO
LYMPHOCYTES # BLD AUTO: 0.7 TH/MM3 (ref 1–4.8)
LYMPHOCYTES NFR BLD AUTO: 2.5 % (ref 9–44)
LYMPHOCYTES: 2 % (ref 9–44)
MAGNESIUM SERPL-MCNC: 1.9 MG/DL (ref 1.5–2.5)
MCH RBC QN AUTO: 22.5 PG (ref 27–34)
MCHC RBC AUTO-ENTMCNC: 32.3 % (ref 32–36)
MCV RBC AUTO: 69.5 FL (ref 80–100)
MONOCYTE #: 1.2 TH/MM3 (ref 0–0.9)
MONOCYTES NFR BLD: 4.7 % (ref 0–8)
MONOCYTES: 6 % (ref 0–8)
NEUTROPHILS # BLD AUTO: 24.3 TH/MM3 (ref 1.8–7.7)
NEUTROPHILS NFR BLD AUTO: 91.9 % (ref 16–70)
NEUTS BAND # BLD MANUAL: 23.8 TH/MM3 (ref 1.8–7.7)
NEUTS BAND NFR BLD: 15 % (ref 0–6)
NEUTS SEG NFR BLD MANUAL: 75 % (ref 16–70)
PLATELET # BLD: 620 TH/MM3 (ref 150–450)
PMV BLD AUTO: 8.5 FL (ref 7–11)
PROTHROMBIN TIME: 14.7 SEC (ref 9.8–11.6)
RBC # BLD AUTO: 8.12 MIL/MM3 (ref 4–5.3)
SODIUM SERPL-SCNC: 136 MEQ/L (ref 136–145)
TOXIC GRANULES BLD QL SMEAR: (no result)
WBC # BLD AUTO: 26.4 TH/MM3 (ref 4–11)
WBC TOXIC VACUOLES BLD QL SMEAR: PRESENT

## 2017-12-18 RX ADMIN — HEPARIN SODIUM PRN MLS/HR: 10000 INJECTION, SOLUTION INTRAVENOUS at 07:51

## 2017-12-18 RX ADMIN — STANDARDIZED SENNA CONCENTRATE AND DOCUSATE SODIUM SCH TAB: 8.6; 5 TABLET, FILM COATED ORAL at 22:32

## 2017-12-18 RX ADMIN — STANDARDIZED SENNA CONCENTRATE AND DOCUSATE SODIUM SCH TAB: 8.6; 5 TABLET, FILM COATED ORAL at 09:29

## 2017-12-18 RX ADMIN — SODIUM CHLORIDE SCH MLS/HR: 900 INJECTION INTRAVENOUS at 16:22

## 2017-12-18 RX ADMIN — Medication SCH ML: at 22:32

## 2017-12-18 RX ADMIN — CLOPIDOGREL BISULFATE SCH MG: 75 TABLET, FILM COATED ORAL at 13:40

## 2017-12-18 RX ADMIN — Medication SCH ML: at 09:30

## 2017-12-18 NOTE — EKG
Date Performed: 2017       Time Performed: 13:32:40

 

PTAGE:      77 years

 

EKG:      Sinus rhythm 

 

 WITH OCCASIONAL SUPRAVENTRICULAR PREMATURE COMPLEXES POSSIBLE LEFT ATRIAL ENLARGEMENT ST DEVIATION A
ND MODERATE T-WAVE ABNORMALITY, CONSIDER INFERIOR ISCHEMIA ABNORMAL ECG Compared to 

 

 PREVIOUS TRACING            , nonspecific ST & T-waves once again noted. Heart rate has slowed somew
hat. PREVIOUS TRACIN2017 12.09

 

DOCTOR:   Germaine Montanez  Interpretating Date/Time  2017 15:38:55

## 2017-12-18 NOTE — RADRPT
EXAM DATE/TIME:  12/18/2017 02:34 

 

[facility] COMPARISON:     

CHEST SINGLE AP, December 17, 2017, 13:26.

 

                     

INDICATIONS :     

New onset of congestion.

                     

 

MEDICAL HISTORY :     

Cerebrovascular disease.          

 

SURGICAL HISTORY :     

Tubal ligation.   

 

ENCOUNTER:     

Subsequent                                        

 

ACUITY:     

1 day      

 

PAIN SCORE:     

0/10

 

LOCATION:     

Bilateral chest 

 

FINDINGS:     

The heart size is normal. There is alveolar density seen at the right base. The lobe lung is clear.

 

CONCLUSION:     

Increased density at the right base likely representing mild atelectasis or consolidation.

 

 

 

 Emir Sorensen MD on December 18, 2017 at 3:11           

Board Certified Radiologist.

 This report was verified electronically.

## 2017-12-18 NOTE — RADRPT
EXAM DATE/TIME:  12/18/2017 05:41 

 

[facility] COMPARISON:     

No previous studies available for comparison.

 

 

INDICATIONS :     

Altered mental status.

                      

 

RADIATION DOSE:     

33.01 CTDIvol (mGy) 

 

 

 

MEDICAL HISTORY :     

Cerebrovascular disease.  

 

SURGICAL HISTORY :      

Tubal ligation. 

 

ENCOUNTER:      

Initial

 

ACUITY:      

1 day

 

PAIN SCALE:      

Non-responsive

 

LOCATION:        

cranial 

 

TECHNIQUE:     

Multiple contiguous axial images were obtained of the head.  Using automated exposure control and adj
ustment of the mA and/or kV according to patient size, radiation dose was kept as low as reasonably a
chievable to obtain optimal diagnostic quality images.   DICOM format image data is available electro
nically for review and comparison.  

 

FINDINGS:     

 

CEREBRUM:     

There is low density seen in the right posterior parietal lobe and extending into the posterior right
 temporal lobe and lateral right occipital lobe. This clearly involves the white matter and the gray 
matter in this region. There is a smaller area of low density seen in the posterior left parietal lob
e. These are thought to be from prior infarctions. There is low-density seen throughout the cerebral 
white matter likely secondary to small vessel ischemic change. There are lacunar infarcts at the righ
t centrum semiovale and the left basal ganglia. There is expansion of the left lateral ventricle. The
re is 2 mm of right-to-left midline shift. The basal cisterns are open. No evidence of midline shift,
 mass lesion, hemorrhage or acute infarction.  No extra-axial fluid collections are seen. The cortica
l sulci are widened.

 

POSTERIOR FOSSA:     

The cerebellum and brainstem are intact.  The 4th ventricle is midline.  The cerebellopontine angle i
s unremarkable.

 

EXTRACRANIAL:     

The visualized portion of the orbits is intact. 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 
There is minimal mucosal disease at the sphenoid sinus.

 

SKULL:     

The calvaria is intact.  No evidence of skull fracture.

 

CONCLUSION:     

1. Bilateral suspected areas of encephalomalacia being more prominent on the right.

2. Suspected small vessel ischemic change throughout the white matter.

3. Atrophy.

4. Acute area of hemorrhage or mass effect is not seen.

 

 

 

 Emir Sorensen MD on December 18, 2017 at 6:01           

Board Certified Radiologist.

 This report was verified electronically.

## 2017-12-18 NOTE — PD.CAR.PN
CVT Progress Note


Subjective/Hospital Course:


Referral received


Full consult to follow


Severe for peripheral vascular disease will require reconstruction


Thanks


J


Objective:





Vital Signs








  Date Time  Temp Pulse Resp B/P (MAP) Pulse Ox O2 Delivery O2 Flow Rate FiO2


 


12/18/17 08:00 97.5 108 18 133/64 (87) 93   


 


12/18/17 04:36  107  158/86 (110)    


 


12/18/17 02:25  123 21 158/98 (118) 93   


 


12/18/17 00:00 98.8 108 18 161/88 (112) 92   


 


12/17/17 21:51 98.2 109 16 175/92 (119) 94   


 


12/17/17 20:00  103      


 


12/17/17 16:00 97.1 90 18 155/84 (107) 96   


 


12/17/17 15:49        


 


12/17/17 15:00  86 20 144/68 (93) 96 Room Air  


 


12/17/17 14:07 98.3 91 17 140/95 (110) 99 Room Air  


 


12/17/17 13:02     95 Room Air  


 


12/17/17 12:03     99 Room Air  


 


12/17/17 12:03  111 18  99 Room Air  


 


12/17/17 11:51 98.2 112 18 147/93 (111) 97   








Labs:





Laboratory Tests








Test


  12/18/17


04:40 12/18/17


06:48


 


White Blood Count


  26.4 TH/MM3


(4.0-11.0) 


 


 


Red Blood Count


  8.12 MIL/MM3


(4.00-5.30) 


 


 


Hemoglobin


  18.2 GM/DL


(11.6-15.3) 


 


 


Hematocrit


  56.4 %


(35.0-46.0) 


 


 


Mean Corpuscular Volume


  69.5 FL


(80.0-100.0) 


 


 


Mean Corpuscular Hemoglobin


  22.5 PG


(27.0-34.0) 


 


 


Mean Corpuscular Hemoglobin


Concent 32.3 %


(32.0-36.0) 


 


 


Red Cell Distribution Width


  18.4 %


(11.6-17.2) 


 


 


Platelet Count


  620 TH/MM3


(150-450) 


 


 


Mean Platelet Volume


  8.5 FL


(7.0-11.0) 


 


 


Neutrophils (%) (Auto)


  91.9 %


(16.0-70.0) 


 


 


Lymphocytes (%) (Auto)


  2.5 %


(9.0-44.0) 


 


 


Monocytes (%) (Auto)


  4.7 %


(0.0-8.0) 


 


 


Eosinophils (%) (Auto)


  0.8 %


(0.0-4.0) 


 


 


Basophils (%) (Auto)


  0.1 %


(0.0-2.0) 


 


 


Neutrophils # (Auto)


  24.3 TH/MM3


(1.8-7.7) 


 


 


Lymphocytes # (Auto)


  0.7 TH/MM3


(1.0-4.8) 


 


 


Monocytes # (Auto)


  1.2 TH/MM3


(0-0.9) 


 


 


Eosinophils # (Auto)


  0.2 TH/MM3


(0-0.4) 


 


 


Basophils # (Auto)


  0.0 TH/MM3


(0-0.2) 


 


 


CBC Comment AUTO DIFF  


 


Differential Total Cells


Counted 100 


  


 


 


Neutrophils % (Manual) 75 % (16-70)  


 


Band Neutrophils % 15 % (0-6)  


 


Lymphocytes % 2 % (9-44)  


 


Monocytes % 6 % (0-8)  


 


Eosinophils % 2 % (0-4)  


 


Neutrophils # (Manual)


  23.8 TH/MM3


(1.8-7.7) 


 


 


Differential Comment


  FINAL DIFF


MANUAL 


 


 


Toxic Granulation 1+ (NORMAL)  


 


Toxic Vacuolation


  PRESENT (NONE


SEEN) 


 


 


Platelet Estimate HIGH (NORMAL)  


 


Platelet Morphology Comment


  ENLARGED


(NORMAL) 


 


 


Blood Urea Nitrogen 6 MG/DL (7-18)  


 


Creatinine


  0.62 MG/DL


(0.50-1.00) 


 


 


Random Glucose


  104 MG/DL


() 


 


 


Calcium Level


  8.5 MG/DL


(8.5-10.1) 


 


 


Magnesium Level


  1.9 MG/DL


(1.5-2.5) 


 


 


Sodium Level


  136 MEQ/L


(136-145) 


 


 


Potassium Level


  3.6 MEQ/L


(3.5-5.1) 


 


 


Chloride Level


  103 MEQ/L


() 


 


 


Carbon Dioxide Level


  21.0 MEQ/L


(21.0-32.0) 


 


 


Anion Gap


  12 MEQ/L


(5-15) 


 


 


Estimat Glomerular Filtration


Rate 94 ML/MIN


(>89) 


 


 


B-Type Natriuretic Peptide


  2380 PG/ML


(0-100) 


 


 


Thyroid Stimulating Hormone


3rd Gen 0.737 uIU/ML


(0.358-3.740) 


 


 


Prothrombin Time


  


  14.7 SEC


(9.8-11.6)


 


Prothromb Time International


Ratio 


  1.5 RATIO 


 


 


Activated Partial


Thromboplast Time 


  32.0 SEC


(24.3-30.1)








Result Diagram:  


12/18/17 0440                                                                  

              12/18/17 0440














Amber Acosta MD Dec 18, 2017 08:57

## 2017-12-18 NOTE — EKG
Date Performed: 2017       Time Performed: 12:09:01

 

PTAGE:      77 years

 

EKG:      SINUS TACHYCARDIA WITH OCCASIONAL VENTRICULAR PREMATURE COMPLEXES POSSIBLE LEFT ATRIAL ENLA
RGEMENT ST DEVIATION AND MODERATE T-WAVE ABNORMALITY, CONSIDER INFERIOR ISCHEMIA ABNORMAL ECG Compare
d to 

 

 PREVIOUS TRACING            , there is now evidence of sinus tachycardia with possible ischemic ST & 
T-wave changes inferiorly and anterolaterally. PREVIOUS TRACIN2012 00.11

 

DOCTOR:   Germaine Montanez  Interpretating Date/Time  2017 15:37:27

## 2017-12-18 NOTE — PD.CONS
Consult


Service


Palliative Care


.


Consult Requested By


 


HEATHER Ortiz


.


Primary Care Physician


No Primary Care Physician


.


Reason for Consultation


   a.  To assist with evaluation and management of symptoms including: right 

lower extremity pain; dyspnea; confusion; generalized weakness


   b.  To assist medical decision maker(s) with: better understanding of 

current medical conditions; weighing benefits/burdens of medical treatment 

options; making medical treatment decisions.


.





HPI


History of Present Illness


The patient is a poor historian.  No available friends/family to provide 

additional information at time of my visit. I am not sure how accurate/reliable 

the history is that I obtained from the patient.





When I try and get a history from the patient she is quite unclear on the timing

/duration of the symptoms that brought her here into the hospital.  She 

apparently has been having some significant pain in the right lower extremity 

for some timepossibly weeks to months.  This became worse and worse and was 

also accompanied by generalized weakness.  At some point she became unable to 

ambulate from bed to bathroom and became bedbound.  She says her daughter-- 

Mariam -- lives with her most of the time and when she couldn't get up to use 

the bathroom, they would use adult diapers.  The patient indicated that she was 

the one who did not want to come to the hospital earlier.  Apparently things 

became so challenging at home that eventually someone in the household 

activated EMS.   I don't believe there is anyone else in the home and the 

paramedics arrived.  The patient was not oriented to month or year for the 

paramedics but otherwise was able to give some history.  She told them that she 

had been bedbound for 7 days.  She was quite soft spoken with slow motor 

movements but no facial droop, arm drift, or slurred speech when they saw her.  

She was extremely tenderh in the lower extremities right more than left.  The 

apartment was apparently quite cluttered.  The patient had been incontinent of 

urine and bowel.  She had not been eating well.





On my interview, the patient tells me she has had no other pain other than the 

pain in the lower extremities.  She tells me that the left lower extremity does 

not hurt at all and that her symptoms are just on the right side.  She is 

unable to quantify the pain.  She says she has no pain in the thigh or knee; 

all of the pain feels like it's in the muscle of the calf.  Movement and touch 

make the pain worse.  Keeping quite still mitigates the pain.  Patient also 

reported that she has had some shortness of breath which has developed over the 

last days.  She has also developed a nonproductive cough.  Exertion makes the 

shortness of breath worse.  Patient reports that she still has some residual 

weakness from her stroke.





Vital signs upon arrival to the emergency department are as follows:  

Temperature 98.3; pulse 91; respiratory rate 17; blood pressure 140/95; pulse 

oximetry 99% on room air.  





Physical examination by the emergency department clinician noted the following:

  Patient was thin, disheveled, cachectic and in no acute distress.  Lower 

extremities were cyanotic.  There is skin breakdown to the left buttock and 

right buttock.  Heart rate was regular.  Lungs were clear.  There is 5 out of 5 

muscle strength in the arms and legs.





Initial diagnostic testing included the following:


* Urinalysis was positive for nitrites, protein, leukocyte esterase.  There 

were 21 WBC/hpf.


* CBC showed WBC 23.2; hemoglobin 19.3; MCV 68.8; platelet count 675.  


* Coagulation profile showed PT 17.5; INR 1.7; PTT 29.9


* Chemistries revealed lactic acid 1.3; BUN 11; creatinine 0.73; glucose 88; 

calcium 8.1; magnesium 1.9; sodium 136; potassium 3.6; chloride 102; CO2 27.9; 

anion gap 6; GFR 78


* Liver function tests showed total bilirubin 0.9; AST 18; ALT 9; alkaline 

phosphatase 100; total protein 7.1; albumin 2.3


* Chest x-ray showed no acute cardiopulmonary disease


* Initial EKG showed sinus tachycardia with occasional PVCs with a rate of 111.


* Lower extremity ultrasound was negative for DVT.


* Head CT showed some bilateral suspected areas of encephalomalacia worse on 

the right.  There is suspected small vessel ischemic changes throughout the 

white matter.  Atrophy was present.  No acute area of hemorrhage or mass effect 

was seen.


* Aorta CT angiogram showed the following  atherosclerotic changes were seen 

throughout the entire arterial system; there was occlusion of the superficial 

femoral arteries bilaterally with reconstitution distally; it was diminished 

flow seen at the right trifurcation vessels.:





The patient met sepsis criteria.  She was given fluid and started on 

intravenous antibiotics in the emergency department.  She was admitted to the 

hospitalist service.  Vascular surgery was consulted.  Vascular surgery felt 

she had severe peripheral vascular disease worse on the right.  He noted near 

occlusion of both common femoral arteries and an occlusion of the SFA with 

reconstitution of the popliteal.  He felt her vascular problems were not 

amenable to endovascular procedures and that she would need a femoropopliteal 

bypass.  And, this could only be considered once the patient overcomes  the 

current infection and becomes stronger.


.


Function/Cognitive Trajectory


Patient is a poor historian and I have no contact information for friends/

family...





The patient was apparently bedbound for up to 7 days according to her.  It is 

unclear how long she was having difficulty walking but it sounds like at least 

weeks or months.  She says she does not have a cane or a walker at home.  She 

indicates that prior to becoming bedbound she was toileting herself.  She has 

not been able to bathe or shower on her own for an unknown period of time but 

would give herself "sponge baths."  Though she tells me she lives with her 

daughter, Mariam, this is also very confusing.  She makes it sound like the 

daughter can be gone for days at a time.  When she is gone, the patient would 

be able to prepare her own food.  She does say her appetite has been less and 

that she has lost an unknown amount of weight.





Again, this history is quite confusing and I'm not sure how reliable it is.


.





Review of Systems


ROS Limitations:  Clinical Condition (patient is intermittently confused and a 

poor historian.)


Constitutional:  COMPLAINS OF: Fatigue, Weight loss, Change in appetite, Pain, 

Generalized weakness, DENIES: Fever, Weight gain


Endocrine:  DENIES: Heat/cold intolerance, Polydipsia, Polyuria, Polyphagia


Eyes:  COMPLAINS OF: Vision loss


Ears, nose, mouth, throat:  DENIES: Hearing loss, Throat pain, Running Nose, 

Epistaxis


Respiratory:  COMPLAINS OF: Cough, Shortness of breath, DENIES: Apneas, Snoring

, Wheezing, Hemoptysis, Sputum production


Cardiovascular:  COMPLAINS OF: Dyspnea on Exertion, Claudication, DENIES: Chest 

pain, Palpitations, Syncope, Lower Extremity Edema


Gastrointestinal:  DENIES: Abdominal pain, Black stools, Bloody stools, 

Constipation, Diarrhea, Nausea, Vomiting, Difficulty Swallowing


Genitourinary:  COMPLAINS OF: Urinary incontinence, DENIES: Hematuria, Dysuria


Musculoskeletal:  COMPLAINS OF: Muscle aches, DENIES: Joint pain, Joint Swelling

, Back pain


Integumentary:  COMPLAINS OF: Abnormal pigmentation


Hematologic/Lymphatics:  DENIES: Bruising


Immunologic/Allergic:  DENIES: Eczema


Neurologic:  COMPLAINS OF: Abnormal gait, Poor Balance, DENIES: Headache, 

Paresthesias, Seizures, Tremor


Psychiatric:  COMPLAINS OF: Confusion, DENIES: Anxiety, Depression, 

Hallucinations





Past Family Social History


Coded Allergies:  


     No Known Allergies (Unverified  Allergy, Unknown, 17)


Past Medical History


* Hyperlipidemia


* Hypertension


.


Past Surgical History


Tubal ligation 1982


Tonsillectomy as a child


.








Reported Medications


Patient takes no regular medications.


.





Current Medications








 Medications


  (Trade)  Dose


 Ordered  Sig/Carlitos


 Route  Start Time


 Stop Time Status Last Admin


 


 Ceftriaxone


 Sodium 1000 mg/


 Sodium Chloride  100 ml @ 


 200 mls/hr  Q24H


 IV  17 16:00


    17 16:22


 


 


  (NS Flush)  2 ml  UNSCH  PRN


 IV FLUSH  17 16:00


     


 


 


  (NS Flush)  2 ml  BID


 IV FLUSH  17 21:00


    17 09:30


 


 


  (Tylenol)  650 mg  Q4H  PRN


 PO  17 16:00


     


 


 


  (Zofran Inj)  4 mg  Q6H  PRN


 IVP  17 16:00


     


 


 


  (Restoril)  15 mg  HS  PRN


 PO  17 16:00


     


 


 


  (Narcan Inj)  0.4 mg  UNSCH  PRN


 IV PUSH  17 16:00


     


 


 


  (Tiarra-Colace)  1 tab  BID


 PO  17 21:00


    17 09:29


 


 


  (Milk Of


 Magnesia Liq)  30 ml  Q12H  PRN


 PO  17 16:00


     


 


 


  (Senokot)  17.2 mg  Q12H  PRN


 PO  17 16:00


     


 


 


  (Dulcolax Supp)  10 mg  DAILY  PRN


 RECTAL  17 16:00


     


 


 


  (Lactulose Liq)  30 ml  DAILY  PRN


 PO  17 16:00


     


 


 


  (Duoneb Neb)  1 ampule  Q4HR NEB  PRN


 NEB  17 04:00


     


 


 


 Heparin Sodium/


 Dextrose  250 ml @ 


 10 mls/hr  TITRATE  PRN


 IV  17 06:30


    17 07:51


 


 


  (Plavix)  75 mg  DAILY


 PO  17 12:00


    17 13:40


 





.


Family History


Patient reports that her father  from cancer caused by his working in the 

cotton mills.  She reports her mother  of "natural causes" at age 90.  She 

has a brother and a sister both of whom are alive.  She does not know their 

health status.


.


Substance Use


Tobacco: Patient was a one pack per day smoker from her teen years up until her 

70s.  She is no longer smoking.


Alcohol: No history of abuse.  Normally less than 1 beer per week as an adult.  


Prescription med abuse:No known history of abuse.


Illicits: No known use of illicits.


.


Psychosocial History


The patient reports she is originally from [place].  She is uncertain when 

she moved to [place].  She initially said 1949 but she also had no idea what 

year it was now.





The patient says she graduated from high school.  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 

a maid.





Patient reports she was  twice.  She was  2 and  1.





Patient reports she has 6 children.  She is uncertain how many grandchildren 

she has.  She is uncertain where 5 of her 6 children currently live.  She tells 

me she, herself, lives with her daughter Mariam.





.


Spiritual/Cultural Factors


Patient reports that Rastafarian is important to her.  She is not a member of a 

eboni community at this time.  She identifies herself as Zoroastrianism.  She does not 

care one way or another if a  visits her in the hospital.


.


Living Will:  Never completed


Health Care Surrogate:  Never completed


Durable Power of :  Never completed


Date completed:


According to the patient, she has never completed an advanced directive.


.


Health Care Surrogate(s):


According to patient, she has never designated in writing a health care 

surrogate.


.


Documented care wishes:


Per the patient, she has no written documentation of her healthcare preferences/

goals/wishes.


.


Today's verbally stated goals:


Patient's goals are currently somewhat ambiguous.  She seems to indicate she 

would not want to be resuscitated if her heart would stop poor lungs would 

stop.  On the other hand she seems very interested in having her vascular 

surgery.  I'm unclear if she can fully weight the benefits and burdens of 

treatment options at this time.


.


Family/friends goals:


Unable to contact any family/friends at this time.


.


Ethical and Legal Issues


The patient is able to answer some questions perfectly appropriately but she 

seems confused at times.  I would recommend that we locate a legal decision-

maker as soon as possible and have decision-making be "shared" with the patient 

and that decision maker until such time that the patient's "capacity" is more 

certain.


.





Physical Exam





Vital Signs








  Date Time  Temp Pulse Resp B/P (MAP) Pulse Ox O2 Delivery O2 Flow Rate FiO2


 


17 16:00 98.4 84 17 110/59 (76) 92   


 


17 12:00 98.8 100 18 124/60 (81) 92   


 


17 08:00 97.5 108 18 133/64 (87) 93   


 


17 04:36  107  158/86 (110)    


 


17 02:25  123 21 158/98 (118) 93   


 


17 00:00 98.8 108 18 161/88 (112) 92   


 


17 21:51 98.2 109 16 175/92 (119) 94   


 


17 20:00  103      





.


Exam


CONSTITUTIONAL/GENERAL: This is a thin, pale, elderly female who is in no 

apparent distress until one touches her right lower extremity or she moves that 

extremity.  Disoriented to time.


TUBES/LINES/DRAINS: Arevalo catheter; peripheral IV


SKIN: No jaundice, rashes, or lesions.  Ischemic changes in the right foot.  No 

wounds seen anteriorly. Skin temperature appropriate. Not diaphoretic. 


HEAD: Atraumatic. Normocephalic.


EYES: Pupils equal and round and reactive. Extraocular motions intact. No 

scleral icterus. No injection or drainage. Fundi not examined.


ENT: Hearing grossly normal. Nose without bleeding or purulent drainage. Throat 

without visible erythema, exudates, masses, or lesions.


NECK: Trachea midline. Supple, nontender. No palpable thyroid enlargement or 

nodularity. 


CARDIOVASCULAR: Normal rate; irregular rhythm.  No murmurs, gallops, or rubs. 

No JVD.  Unable to palpate extremities in the ankles and feet.


RESPIRATORY/CHEST: Symmetric, unlabored respirations. Breath sounds equal 

bilaterally but diminished throughout.. No wheezes, rales, or rhonchi.  


GASTROINTESTINAL: Abdomen soft, non-tender, nondistended. No hepato-splenomegaly

, or palpable masses. No guarding. Bowel sounds present.


GENITOURINARY: Without palpable bladder distension. Arevalo catheter in place.


MUSCULOSKELETAL: Extremities without clubbing or edema.  Right foot is cyanotic 

appearing.  Decreased capillary refill in both feet.  Entire right calf is 

exquisitely tender to touch without palpable cord.  


LYMPHATICS: No palpable cervical or supraclavicular adenopathy.


NEUROLOGICAL: Awake and alert. Motor and sensory grossly within normal limits. 

Follows commands.  Disoriented to time.  Intermittently confused.  Moves all 

extremities.


PSYCHIATRIC: No obvious anxiety/depression. no apparent hallucinations or other 

psychotic thought process.


.





Diagnostic Tests


Laboratory





Laboratory Tests








Test


  17


12:00 17


12:05 17


13:20 17


04:40


 


Urine Color


  LIGHT-RED


(YELLW/STRAW) 


  


  


 


 


Urine Turbidity HAZY (CLEAR)    


 


Urine pH 5.5 (5.0-8.5)    


 


Urine Specific Gravity


  1.024


(1.002-1.035) 


  


  


 


 


Urine Protein


  30 mg/dL


(NEG-TRACE) 


  


  


 


 


Urine Glucose (UA)


  NEG mg/dL


(NEG) 


  


  


 


 


Urine Ketones 10 mg/dL (NEG)    


 


Urine Occult Blood NEG (NEG)    


 


Urine Nitrite POS (NEG)    


 


Urine Bilirubin NEG (NEG)    


 


Urine Urobilinogen


  2.0 MG/DL


(LESS THAN 


  


  


 


 


Urine Leukocyte Esterase MOD (NEG)    


 


Urine RBC 3 /hpf (0-3)    


 


Urine WBC 21 /hpf (0-5)    


 


Urine Squamous Epithelial


Cells 2 /hpf (0-5) 


  


  


  


 


 


Urine Amorphous Sediment OCC    


 


Urine Bacteria


  MANY /hpf


(NONE) 


  


  


 


 


Urine Mucus


  MANY /lpf


(OCC) 


  


  


 


 


Microscopic Urinalysis Comment


  CATH-CULTURE


IND 


  


  


 


 


White Blood Count


  


  23.2 TH/MM3


(4.0-11.0) 


  26.4 TH/MM3


(4.0-11.0)


 


Red Blood Count


  


  8.36 MIL/MM3


(4.00-5.30) 


  8.12 MIL/MM3


(4.00-5.30)


 


Hemoglobin


  


  19.3 GM/DL


(11.6-15.3) 


  18.2 GM/DL


(11.6-15.3)


 


Hematocrit


  


  59.6 %


(35.0-46.0) 


  56.4 %


(35.0-46.0)


 


Mean Corpuscular Volume


  


  68.8 FL


(80.0-100.0) 


  69.5 FL


(80.0-100.0)


 


Mean Corpuscular Hemoglobin


  


  22.3 PG


(27.0-34.0) 


  22.5 PG


(27.0-34.0)


 


Mean Corpuscular Hemoglobin


Concent 


  32.4 %


(32.0-36.0) 


  32.3 %


(32.0-36.0)


 


Red Cell Distribution Width


  


  18.8 %


(11.6-17.2) 


  18.4 %


(11.6-17.2)


 


Platelet Count


  


  675 TH/MM3


(150-450) 


  620 TH/MM3


(150-450)


 


Mean Platelet Volume


  


  8.2 FL


(7.0-11.0) 


  8.5 FL


(7.0-11.0)


 


Neutrophils (%) (Auto)


  


  90.5 %


(16.0-70.0) 


  91.9 %


(16.0-70.0)


 


Lymphocytes (%) (Auto)


  


  3.1 %


(9.0-44.0) 


  2.5 %


(9.0-44.0)


 


Monocytes (%) (Auto)


  


  5.4 %


(0.0-8.0) 


  4.7 %


(0.0-8.0)


 


Eosinophils (%) (Auto)


  


  0.9 %


(0.0-4.0) 


  0.8 %


(0.0-4.0)


 


Basophils (%) (Auto)


  


  0.1 %


(0.0-2.0) 


  0.1 %


(0.0-2.0)


 


Neutrophils # (Auto)


  


  21.0 TH/MM3


(1.8-7.7) 


  24.3 TH/MM3


(1.8-7.7)


 


Lymphocytes # (Auto)


  


  0.7 TH/MM3


(1.0-4.8) 


  0.7 TH/MM3


(1.0-4.8)


 


Monocytes # (Auto)


  


  1.2 TH/MM3


(0-0.9) 


  1.2 TH/MM3


(0-0.9)


 


Eosinophils # (Auto)


  


  0.2 TH/MM3


(0-0.4) 


  0.2 TH/MM3


(0-0.4)


 


Basophils # (Auto)


  


  0.0 TH/MM3


(0-0.2) 


  0.0 TH/MM3


(0-0.2)


 


CBC Comment  AUTO DIFF   AUTO DIFF 


 


Differential Comment


  


  AUTO DIFF


CONFIRMED 


  FINAL DIFF


MANUAL


 


Toxic Vacuolation


  


  PRESENT (NONE


SEEN) 


  PRESENT (NONE


SEEN)


 


Platelet Estimate  HIGH (NORMAL)   HIGH (NORMAL) 


 


Platelet Morphology Comment


  


  ENLARGED


(NORMAL) 


  ENLARGED


(NORMAL)


 


Prothrombin Time


  


  17.5 SEC


(9.8-11.6) 


  


 


 


Prothromb Time International


Ratio 


  1.7 RATIO 


  


  


 


 


Activated Partial


Thromboplast Time 


  29.9 SEC


(24.3-30.1) 


  


 


 


Lactic Acid Level


  


  1.3 mmol/L


(0.4-2.0) 


  


 


 


Blood Urea Nitrogen


  


  


  11 MG/DL


(7-18) 6 MG/DL (7-18) 


 


 


Creatinine


  


  


  0.73 MG/DL


(0.50-1.00) 0.62 MG/DL


(0.50-1.00)


 


Random Glucose


  


  


  88 MG/DL


() 104 MG/DL


()


 


Total Protein


  


  


  7.1 GM/DL


(6.4-8.2) 


 


 


Albumin


  


  


  2.3 GM/DL


(3.4-5.0) 


 


 


Calcium Level


  


  


  8.1 MG/DL


(8.5-10.1) 8.5 MG/DL


(8.5-10.1)


 


Magnesium Level


  


  


  1.9 MG/DL


(1.5-2.5) 1.9 MG/DL


(1.5-2.5)


 


Alkaline Phosphatase


  


  


  100 U/L


() 


 


 


Aspartate Amino Transf


(AST/SGOT) 


  


  18 U/L (15-37) 


  


 


 


Alanine Aminotransferase


(ALT/SGPT) 


  


  9 U/L (10-53) 


  


 


 


Total Bilirubin


  


  


  0.9 MG/DL


(0.2-1.0) 


 


 


Sodium Level


  


  


  136 MEQ/L


(136-145) 136 MEQ/L


(136-145)


 


Potassium Level


  


  


  3.6 MEQ/L


(3.5-5.1) 3.6 MEQ/L


(3.5-5.1)


 


Chloride Level


  


  


  102 MEQ/L


() 103 MEQ/L


()


 


Carbon Dioxide Level


  


  


  27.9 MEQ/L


(21.0-32.0) 21.0 MEQ/L


(21.0-32.0)


 


Anion Gap


  


  


  6 MEQ/L (5-15) 


  12 MEQ/L


(5-15)


 


Estimat Glomerular Filtration


Rate 


  


  78 ML/MIN


(>89) 94 ML/MIN


(>89)


 


Total Creatine Kinase


  


  


  81 U/L


() 


 


 


Differential Total Cells


Counted 


  


  


  100 


 


 


Neutrophils % (Manual)    75 % (16-70) 


 


Band Neutrophils %    15 % (0-6) 


 


Lymphocytes %    2 % (9-44) 


 


Monocytes %    6 % (0-8) 


 


Eosinophils %    2 % (0-4) 


 


Neutrophils # (Manual)


  


  


  


  23.8 TH/MM3


(1.8-7.7)


 


Toxic Granulation    1+ (NORMAL) 


 


B-Type Natriuretic Peptide


  


  


  


  2380 PG/ML


(0-100)


 


Thyroid Stimulating Hormone


3rd Gen 


  


  


  0.737 uIU/ML


(0.358-3.740)


 


Test


  17


06:48 17


13:55 17


15:02 


 


 


Prothrombin Time


  14.7 SEC


(9.8-11.6) 


  


  


 


 


Prothromb Time International


Ratio 1.5 RATIO 


  


  


  


 


 


Activated Partial


Thromboplast Time 32.0 SEC


(24.3-30.1) 44.6 SEC


(24.3-30.1) 36.6 SEC


(24.3-30.1) 


 





.


Result Diagram:  


170                                                                  

              17 0440





Microbiology





Microbiology








 Date/Time


Source Procedure


Growth Status


 


 


 17 12:20


Blood Peripheral Aerobic Blood Culture - Preliminary


NO GROWTH IN 1 DAY Resulted


 


 17 12:20


Blood Peripheral Anaerobic Blood Culture - Preliminary


NO GROWTH IN 1 DAY Resulted





 17 12:05


Blood Peripheral Aerobic Blood Culture - Preliminary


NO GROWTH IN 1 DAY Resulted


 


 17 12:05


Blood Peripheral Anaerobic Blood Culture - Preliminary


NO GROWTH IN 1 DAY Resulted


 


 17 12:00


Urine Catheterized Urine Urine Culture - Preliminary


Gram Negative Frantz Resulted





.


Imaging





Last Impressions








Head CT 17 0000 Signed





Impressions: 





 Service Date/Time:  2017 05:41 - CONCLUSION:  1. 

Bilateral 





 suspected areas of encephalomalacia being more prominent on the right. 2. 





 Suspected small vessel ischemic change throughout the white matter. 3. 

Atrophy. 





 4. Acute area of hemorrhage or mass effect is not seen.     Emir Sorensen MD 


 


Chest X-Ray 17 0000 Signed





Impressions: 





 Service Date/Time:  2017 02:34 - CONCLUSION:  Increased 





 density at the right base likely representing mild atelectasis or 

consolidation. 





     Emir Sorensen MD 


 


Lower Extremity Ultrasound 17 0000 Signed





Impressions: 





 Service Date/Time:  2017 20:48 - CONCLUSION: No DVT.     





 Emir Sorensen MD 


 


Aorta w/Runoff CTA 17 0000 Signed





Impressions: 





 Service Date/Time:  2017 17:47 - CONCLUSION:  1. 





 Atherosclerotic changes seen throughout the arterial system, including 





 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 

prominent 





 mural thrombus. 2. Atherosclerotic change in the external iliac and common 





 femoral arteries bilaterally as described above.  3. Occlusion of the 





 superficial femoral arteries bilaterally with reconstitution distally.  The 





 reconstitution is higher on the left side.  4. Normal trifurcation vessels 

seen 





 on the left side. 5. Diminished flow seen at the right trifurcation vessels.  





 The vessels can only be well seen on the delayed images.  The vessels can not 

be 





 traced into the foot.           Emir Sorensen MD 





.





Patient/Family Conference


Present at Family Conference:


I have no contact information for friends or family.  I attempted to get as 

much history as possible from the patient herself.


.


Family Conference Time (mins):  25 (Spoke with patient for about 25 minutes at 

the bedside not including the time of the physical examination.)


Family Conference Location:  Bedside


Issues Discussed:


* Palliative care role, purpose, approach


* Additional medical, psychosocial, and spiritual history


* Patients general health, functional status, and cognitive changes in the 

months leading up to the current hospitalization


* Patient understanding of the current medical problems


* Patient understanding of prognosis


* Patients goals of care 


* Current medical treatment options and benefits/burdens of those options


.





Assessment and Plan


Disease Oriented Problem List:  


(1) Sepsis


(2) UTI (urinary tract infection)


(3) Peripheral vascular disease


(4) Microcytosis


(5) Polycythemia


(6) Thrombocytosis


(7) Hypoalbuminemia


(8) Elevated brain natriuretic peptide (BNP) level


(9) Stroke


Symptom Scale:  


(1) Confusion


0-10 Scale:  Unable to quantify


Comment:  See assessment





(2) Pain


0-10 Scale:  Unable to quantify


Comment:  See assessment





(3) Cough


0-10 Scale:  Unable to quantify





(4) Dyspnea


0-10 Scale:  Unable to quantify


Comment:  See assessment





(5) Generalized weakness


0-10 Scale:  Unable to quantify


Comment:  See assessment





Pertinent Non-Medical Issues


Psychosocial:  Reportedly lives with daughter in crowded, cluttered apartment. 

6 children altogether but patient can't tell me what state they are in.


Spiritual:  Zoroastrianism. Not particularly interested in chaplaincy visits.


Legal: No known advance directives. 


Ethical issues impacting care: Patient's capacity is questionable. Recommend at 

least shared decision making until she is cognitively clearer or we see that 

goals/preferences are consistent. 


.


Important Contacts


Mariam Jameson (daughter)  :   ? phone number


.


Prognosis


It is unclear to what extent patient's current status is due to her peripheral 

vascular disease and to what extent there are other factors. Might some of her 

confusion be due to vascular dementia (she is post stroke).  She has elevated Hg

, elevated platelets, and microcytosis.  Does she have polycythemia?  Will she 

get well enough to be a candidate for vascular surgery? 





If she decides she does NOT want surgery, then she will likely remain bedbound 

and continue to lose weight.  In that case life expectancy would likely be less 

than 6 months. 


.


Code Status:  Full Code


Plan


==  Decision making:  Patient's capacity to make her own health care decisions 

is questionable. She answers some questions appropriately, but does not know 

what year it is, is unsure what states her children live in, and is a very poor 

historian in terms of how long she has had difficulty walking or how long she 

has had leg pain.  It is unclear to what extent her cognitive issues are due to 

acute illness vs chronic condition.  At this point in time I would recommend 

shared decision making with proxy or proxies.  The patient was unclear who she 

wanted to serve as her health care surrogate even though she lives with her 

daughter.  She reports that she has 6 children.  The daughter does not have a 

phone number for me to call per patient and there is no number in the chart.





==  Code Status:  FULL CODE -- Patient gives mixed messages regarding 

resuscitation status.  She indicates she would not want to be resuscitated but 

indicates she would want the vascular surgery and I'm not certain she is able 

to weight the benfits /burdens of her options.  For that reason, I believe she 

should remain FULL CODE until we are certain of her capacity or we have a proxy 

participating in the decision making.





==  Goals of medical treatment:  Given the above, I recommend that goals remain 

aggressive until such time as patient becomes cognitively clearer and/or we 

have a legal decision maker to participate in the decision making.





==  Symptoms:


* Right lower extremity pain:  This appears to be entirely due to ischemia.  It 

is worse with movement.  Most of her pain is in the calf.  She denies joint 

pain.  She can't quantify the pain.  Just touching the right calf is painful 

for her.  Patient currently has no scheduled pain medications ordered.  I do 

not believe she will be able to ask for pain meds.  Recommend scheduling at 

least acetaminophen 650 mg q 8 hours around the clock. 


* Dyspnea:  She reports this is a new symptom.  She has some shortness of 

breath at rest, but even more with exertion.  She can't quantify this symptom.  

BNP was 2380 on 17 (she received subtantial fluids in the ED) suggesting 

a component of CHF.  CXR suggests possible RLL consolidation.  May want to 

consider a one time diuretic in case the ER fluids caused an overload.  She has 

a prn nebulizer order -- would recommend scheduling q 8 hours while awake in 

addition to the prn. 


* Cough:  She has a new non-productive cough with possible RLL consolidation.  

May want to do swallow study to rule out dysphagia and risk of aspiration. 


* Generalized weakness:  Both weakness and right lower extremity pain seemed to 

make her bedbound in the days leading up to hospital admission.   She still has 

some residual weakness from her stroke in .


* Confusion:  Unclear to what extent this is acute due to her clinical 

condition or is more chronic. Will re-assess.  Hopefully, we can contact family 

who will provide data on her baseline cognitive status. 





==  She has hematologic findings that may need further evaluation -- elevated Hg

, microcytosis, thrombocytosis.  Might she have polycythemia?  Evaluation might 

be important in deciding if she would be a candidate for vascular surgery and 

her life expectancy with or without the surgery.  





== Will have palliative care  collaborate with case management to 

try and get contact information for daughter or any other children.





==  If patient becomes cognitively clear, we will try and get her to complete 

health care surrogate forms and we will re-visit resuscitation wishes.  If she 

doesn't clear, we need to identify the appropriate proxy or proxies and speak 

to them about resuscitation status and goals.





==  Palliative care will continue to follow to assist with symptom management 

and to further clarify goals of medical treatment as the clinical course 

evolves. 


.





Thank you for the opportunity to participate in the care of Ms. Leonard.


.





Attestation


To help prompt me to consider important information that might be impacting 

today's encounter and assessment, information from prior notes written by 

myself or my colleagues may have been "brought forward" into today's note.  My 

signature on this note, however, is an attestation that I personally performed 

the exam, history, and/or decision-making noted today, and, unless otherwise 

indicated, the interactions with patient, family, and staff as well as the 

review of records all occurred today.  I also attest that the listed assessment 

and stated plan reflect my best clinical judgment today based on the 

combination of historical information, prior notes, and today's exam/ 

interactions.  When time spent is documented, it refers only to time spent 

today by the signer, or if indicated, combined time spent today by 

collaborating physician/nurse practitioner.


.











Anthony Coley MD Dec 18, 2017 18:30

## 2017-12-18 NOTE — HHI.PR
Subjective


Remarks


Patient is 77-year-old female with PMH of HTN, HLD brought into the emergency 

department via EMS for evaluation of generalized weakness.  Patient is a poor 

historian. Per their report she has been laying on her couch for the last 7 

days unable to get up because she has been weak.  Apparently either patient or 

daughter called 911.  Patient states that she has not been cleaned in several 

days.  Patient states she has not been eating well.  EMS reported that living 

conditions were suboptimal at best.  Patient smelled of feces and urine on 

arrival. Further work up reveals UTI  with sepsis. Started on abb after 

cultures obtained. 


Patient also was noted with discolored LE bluish discoloration, cold LE , and 

no pulses detected by US. Ao run off ordered and also vascular surgeon 

consulted for further eval.





12-18 DW DR SANTOS WILL NEED SURGERY LATER THIS WEEK


DW RN AND PT AND CM 


NO CURRENT COMPLAINTS


HAS PATINO IN PLACE


AM LABS


WILL NEED SNF AT DC





Objective


Vitals





Vital Signs








  Date Time  Temp Pulse Resp B/P (MAP) Pulse Ox O2 Delivery O2 Flow Rate FiO2


 


12/18/17 08:00 97.5 108 18 133/64 (87) 93   


 


12/18/17 04:36  107  158/86 (110)    


 


12/18/17 02:25  123 21 158/98 (118) 93   


 


12/18/17 00:00 98.8 108 18 161/88 (112) 92   


 


12/17/17 21:51 98.2 109 16 175/92 (119) 94   


 


12/17/17 20:00  103      


 


12/17/17 16:00 97.1 90 18 155/84 (107) 96   


 


12/17/17 15:49        


 


12/17/17 15:00  86 20 144/68 (93) 96 Room Air  


 


12/17/17 14:07 98.3 91 17 140/95 (110) 99 Room Air  


 


12/17/17 13:02     95 Room Air  


 


12/17/17 12:03     99 Room Air  


 


12/17/17 12:03  111 18  99 Room Air  


 


12/17/17 11:51 98.2 112 18 147/93 (111) 97   














I/O      


 


 12/17/17 12/17/17 12/17/17 12/18/17 12/18/17 12/18/17





 07:00 15:00 23:00 07:00 15:00 23:00


 


Intake Total   1150 ml 800 ml  


 


Output Total   300 ml 1800 ml  


 


Balance   850 ml -1000 ml  


 


      


 


Intake Oral   100 ml   


 


IV Total   1050 ml 800 ml  


 


Output Urine Total   300 ml 1800 ml  


 


# Bowel Movements   0 0  








Result Diagram:  


12/18/17 0440                                                                  

              12/18/17 0440





Other Results





 Laboratory Tests








Test


  12/17/17


12:00 12/17/17


12:05 12/17/17


13:20 12/18/17


04:40


 


Urine Color LIGHT-RED    


 


Urine Turbidity HAZY    


 


Urine pH 5.5    


 


Urine Specific Gravity 1.024    


 


Urine Protein 30 mg/dL    


 


Urine Glucose (UA) NEG mg/dL    


 


Urine Ketones 10 mg/dL    


 


Urine Occult Blood NEG    


 


Urine Nitrite POS    


 


Urine Bilirubin NEG    


 


Urine Urobilinogen 2.0 MG/DL    


 


Urine Leukocyte Esterase MOD    


 


Urine RBC 3 /hpf    


 


Urine WBC 21 /hpf    


 


Urine Squamous Epithelial


Cells 2 /hpf 


  


  


  


 


 


Urine Amorphous Sediment OCC    


 


Urine Bacteria MANY /hpf    


 


Urine Mucus MANY /lpf    


 


Microscopic Urinalysis Comment


  CATH-CULTURE


IND 


  


  


 


 


White Blood Count  23.2 TH/MM3   26.4 TH/MM3 


 


Red Blood Count  8.36 MIL/MM3   8.12 MIL/MM3 


 


Hemoglobin  19.3 GM/DL   18.2 GM/DL 


 


Hematocrit  59.6 %   56.4 % 


 


Mean Corpuscular Volume  68.8 FL   69.5 FL 


 


Mean Corpuscular Hemoglobin  22.3 PG   22.5 PG 


 


Mean Corpuscular Hemoglobin


Concent 


  32.4 % 


  


  32.3 % 


 


 


Red Cell Distribution Width  18.8 %   18.4 % 


 


Platelet Count  675 TH/MM3   620 TH/MM3 


 


Mean Platelet Volume  8.2 FL   8.5 FL 


 


Neutrophils (%) (Auto)  90.5 %   91.9 % 


 


Lymphocytes (%) (Auto)  3.1 %   2.5 % 


 


Monocytes (%) (Auto)  5.4 %   4.7 % 


 


Eosinophils (%) (Auto)  0.9 %   0.8 % 


 


Basophils (%) (Auto)  0.1 %   0.1 % 


 


Neutrophils # (Auto)  21.0 TH/MM3   24.3 TH/MM3 


 


Lymphocytes # (Auto)  0.7 TH/MM3   0.7 TH/MM3 


 


Monocytes # (Auto)  1.2 TH/MM3   1.2 TH/MM3 


 


Eosinophils # (Auto)  0.2 TH/MM3   0.2 TH/MM3 


 


Basophils # (Auto)  0.0 TH/MM3   0.0 TH/MM3 


 


CBC Comment  AUTO DIFF   AUTO DIFF 


 


Differential Comment


  


  AUTO DIFF


CONFIRMED 


  FINAL DIFF


MANUAL


 


Toxic Vacuolation  PRESENT   PRESENT 


 


Platelet Estimate  HIGH   HIGH 


 


Platelet Morphology Comment  ENLARGED   ENLARGED 


 


Prothrombin Time  17.5 SEC   


 


Prothromb Time International


Ratio 


  1.7 RATIO 


  


  


 


 


Activated Partial


Thromboplast Time 


  29.9 SEC 


  


  


 


 


Lactic Acid Level  1.3 mmol/L   


 


Blood Urea Nitrogen   11 MG/DL  6 MG/DL 


 


Creatinine   0.73 MG/DL  0.62 MG/DL 


 


Random Glucose   88 MG/DL  104 MG/DL 


 


Total Protein   7.1 GM/DL  


 


Albumin   2.3 GM/DL  


 


Calcium Level   8.1 MG/DL  8.5 MG/DL 


 


Magnesium Level   1.9 MG/DL  1.9 MG/DL 


 


Alkaline Phosphatase   100 U/L  


 


Aspartate Amino Transf


(AST/SGOT) 


  


  18 U/L 


  


 


 


Alanine Aminotransferase


(ALT/SGPT) 


  


  9 U/L 


  


 


 


Total Bilirubin   0.9 MG/DL  


 


Sodium Level   136 MEQ/L  136 MEQ/L 


 


Potassium Level   3.6 MEQ/L  3.6 MEQ/L 


 


Chloride Level   102 MEQ/L  103 MEQ/L 


 


Carbon Dioxide Level   27.9 MEQ/L  21.0 MEQ/L 


 


Anion Gap   6 MEQ/L  12 MEQ/L 


 


Estimat Glomerular Filtration


Rate 


  


  78 ML/MIN 


  94 ML/MIN 


 


 


Total Creatine Kinase   81 U/L  


 


Differential Total Cells


Counted 


  


  


  100 


 


 


Neutrophils % (Manual)    75 % 


 


Band Neutrophils %    15 % 


 


Lymphocytes %    2 % 


 


Monocytes %    6 % 


 


Eosinophils %    2 % 


 


Neutrophils # (Manual)    23.8 TH/MM3 


 


Toxic Granulation    1+ 


 


B-Type Natriuretic Peptide    2380 PG/ML 


 


Thyroid Stimulating Hormone


3rd Gen 


  


  


  0.737 uIU/ML 


 


 


Test


  12/18/17


06:48 


  


  


 


 


Prothrombin Time 14.7 SEC    


 


Prothromb Time International


Ratio 1.5 RATIO 


  


  


  


 


 


Activated Partial


Thromboplast Time 32.0 SEC 


  


  


  


 








Imaging





Last Impressions








Head CT 12/18/17 0000 Signed





Impressions: 





 Service Date/Time:  Monday, December 18, 2017 05:41 - CONCLUSION:  1. 

Bilateral 





 suspected areas of encephalomalacia being more prominent on the right. 2. 





 Suspected small vessel ischemic change throughout the white matter. 3. 

Atrophy. 





 4. Acute area of hemorrhage or mass effect is not seen.     Emir Sorensen MD 


 


Chest X-Ray 12/18/17 0000 Signed





Impressions: 





 Service Date/Time:  Monday, December 18, 2017 02:34 - CONCLUSION:  Increased 





 density at the right base likely representing mild atelectasis or 

consolidation. 





     Emir Sorensen MD 


 


Lower Extremity Ultrasound 12/17/17 0000 Signed





Impressions: 





 Service Date/Time:  Sunday, December 17, 2017 20:48 - CONCLUSION: No DVT.     





 Emir Sorensen MD 


 


Aorta w/Runoff CTA 12/17/17 0000 Signed





Impressions: 





 Service Date/Time:  Sunday, December 17, 2017 17:47 - CONCLUSION:  1. 





 Atherosclerotic changes seen throughout the arterial system, including 





 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 

prominent 





 mural thrombus. 2. Atherosclerotic change in the external iliac and common 





 femoral arteries bilaterally as described above.  3. Occlusion of the 





 superficial femoral arteries bilaterally with reconstitution distally.  The 





 reconstitution is higher on the left side.  4. Normal trifurcation vessels 

seen 





 on the left side. 5. Diminished flow seen at the right trifurcation vessels.  





 The vessels can only be well seen on the delayed images.  The vessels can not 

be 





 traced into the foot.           Emir Sorensen MD 








Objective Remarks


GENERAL: Awake and alert very soft-spoken but somewhat confused appears stated 

age some confusion


SKIN: Cool and dry.  Decreased warmth in bilateral lower extremities


HEAD: Atraumatic. Normocephalic. 


EYES: Pupils equal and round. No scleral icterus. No injection or drainage. 


ENT: No nasal bleeding or discharge.  Mucous membranes pink and moist.  Tongue 

is midline


NECK: Trachea midline. No JVD.  Supple


CARDIOVASCULAR: Regular rate and rhythm.  S1-S2 no S3 or S4 no heave or thrill 

or rub or gallop


RESPIRATORY: No accessory muscle use. Clear to auscultation. Breath sounds 

equal bilaterally. 


GASTROINTESTINAL: Abdomen soft, non-tender, nondistended. Hepatic and splenic 

margins not palpable. 


MUSCULOSKELETAL: Extremities without clubbing or edema. No obvious deformities.

  Has some decreased peripheral pulses and some cyanosis in bilateral lower 

extremities


NEUROLOGICAL: Awake and alert. No obvious cranial nerve deficits.  Motor 

grossly within normal limits.  4 out of 5 muscle strength in the arms and legs.

  Normal speech.


PSYCHIATRIC: INAppropriate mood and affect; insight and judgment ABnormal.


Medications and IVs





Current Medications


Sodium Chloride 1,000 ml @  1,000 mls/hr Q1H ONCE IV  Last administered on 12/17 /17at 13:04;  Start 12/17/17 at 11:57;  Stop 12/17/17 at 12:56;  Status DC


Sodium Chloride 800 ml @  1,000 mls/hr Q48M ONCE IV  Last administered on 12/17/ 17at 13:04;  Start 12/17/17 at 11:57;  Stop 12/17/17 at 12:44;  Status DC


Piperacillin Sod/ Tazobactam Sod 100 ml @  200 mls/hr ONCE  STAT IV  Last 

administered on 12/17/17at 13:11;  Start 12/17/17 at 12:52;  Stop 12/17/17 at 13

:21;  Status DC


Vancomycin HCl 1000 mg/Sodium Chloride 250 ml @  250 mls/hr ONCE  ONCE IV  Last 

administered on 12/17/17at 14:07;  Start 12/17/17 at 13:15;  Stop 12/17/17 at 14

:14;  Status DC


Ceftriaxone Sodium 1000 mg/ Sodium Chloride 100 ml @  200 mls/hr Q24H IV  Last 

administered on 12/17/17at 17:25;  Start 12/17/17 at 16:00


Sodium Chloride 1,000 ml @  100 mls/hr Q10H IV  Last administered on 12/17/17at 

16:00;  Start 12/17/17 at 16:00;  Stop 12/18/17 at 03:41;  Status DC


Sodium Chloride (NS Flush) 2 ml UNSCH  PRN IV FLUSH FLUSH AFTER USING IV ACCESS

;  Start 12/17/17 at 16:00


Sodium Chloride (NS Flush) 2 ml BID IV FLUSH  Last administered on 12/18/17at 09

:30;  Start 12/17/17 at 21:00


Acetaminophen (Tylenol) 650 mg Q4H  PRN PO TEMP > 100.4;  Start 12/17/17 at 16:

00


Ondansetron HCl (Zofran Inj) 4 mg Q6H  PRN IVP NAUSEA OR VOMITING;  Start 12/17/ 17 at 16:00


Temazepam (Restoril) 15 mg HS  PRN PO INSOMNIA;  Start 12/17/17 at 16:00


Enoxaparin Sodium (Lovenox Inj) 40 mg Q24H SQ  Last administered on 12/17/17at 

18:47;  Start 12/17/17 at 17:00


Naloxone HCl (Narcan Inj) 0.4 mg UNSCH  PRN IV PUSH SEE LABEL COMMENTS;  Start 

12/17/17 at 16:00


Senna/Docusate Sodium (Tiarra-Colace) 1 tab BID PO  Last administered on 12/18/ 17at 09:29;  Start 12/17/17 at 21:00


Magnesium Hydroxide (Milk Of Magnesia Liq) 30 ml Q12H  PRN PO Mild constipation

;  Start 12/17/17 at 16:00


Sennosides (Senokot) 17.2 mg Q12H  PRN PO Moderate constipation;  Start 12/17/ 17 at 16:00


Bisacodyl (Dulcolax Supp) 10 mg DAILY  PRN RECTAL SEVERE CONSITIPATION;  Start 

12/17/17 at 16:00


Lactulose (Lactulose Liq) 30 ml DAILY  PRN PO SEVERE CONSITIPATION;  Start 12/17 /17 at 16:00


Iohexol (Omnipaque 350 Inj) 100 ml STK-MED ONCE IVCONTRAST  Last administered 

on 12/17/17at 18:02;  Start 12/17/17 at 18:02;  Stop 12/17/17 at 18:07;  Status 

DC


Clonidine (Catapres) 0.1 mg ONCE  ONCE PO  Last administered on 12/17/17at 22:05

;  Start 12/17/17 at 21:15;  Stop 12/17/17 at 21:26;  Status DC


Furosemide (Lasix Inj) 40 mg ONCE  ONCE IV PUSH  Last administered on 12/18/ 17at 04:03;  Start 12/18/17 at 03:45;  Stop 12/18/17 at 03:47;  Status DC


Albuterol/ Ipratropium (Duoneb Neb) 1 ampule Q4HR NEB  PRN NEB SOB/WHEEZING;  

Start 12/18/17 at 04:00


Heparin Sodium/ Dextrose 250 ml @  10 mls/hr TITRATE  PRN IV Coagulation 

Management Last administered on 12/18/17at 07:51;  Start 12/18/17 at 06:30


Urinary Catheter:  Yes


Assessment to:  Continue


Patino insert reason:  Obstruction/Retention





A/P


Assessment and Plan


Bilateral LE discoloration and unable to feel DP pulses. Patient with pain and 

cold extremities. Will do CTA Ao run off and will consult vascular surgery 

discussed with Dr. SANTOS WILL NEED SURGERY


Started on antibiotic Rocephin IV . Received vanco and zosyn in the ED. 

Received bolus of NS in the eD. Continue IVF. Monitor VS closely. 


Urine cultures blood cx are pending 


Initial EKG shows sinus tachycardia with occasional PVCs.  Rate is 111, this 

was reviewed 


Chest x-ray shows no acute disease


Restart home meds as appropriate patient says she currently doesn't take any 

meds. 


Repeat labs cbc, CMP tomorrow 


Consult PT for eval CONSULT OT


Consult case management for DC plan 


DVT ppx lovenox


Discharge Planning


WILL NEED SNF AT DC











Froilan Marrufo DO Dec 18, 2017 12:03

## 2017-12-18 NOTE — MB
cc:

AMBER LINARES MD

****

 

 

DATE OF CONSULTATION:  12/18/2017

 

CONSULTING PHYSICIAN

Dr. Linares, Vascular Surgery

 

REASON FOR CONSULTATION

Ischemia of both legs, right more than left, sepsis.

 

HISTORY OF PRESENT ILLNESS

This 77-year-old lady was admitted through the emergency room yesterday for

generalized weakness.  The patient is a very poor historian, barely says

anything. Apparently, she was at home for the last week or so barely doing

anything, too weak to get up or eat or drink.  The patient came to the hospital

dirty, smelling of feces and urine.  She was diagnosed with sepsis based in the

urinary tract infection. At the time of arrival she was noted to have cyanotic

discoloration of the right foot and hence, the consultation.

 

PAST MEDICAL HISTORY

1.  Hypertension.

2.  Hyperlipidemia.

 

PAST SURGICAL HISTORY

1.  Tonsillectomy.

2.  Tubal ligation.

 

MEDICATIONS

Unknown.

 

ALLERGIES

Unknown.

 

SOCIAL HISTORY

The patient is not following up with doctors. She apparently smoked about one

pack a day since age of 20 and stopped somewhere in the 70s.

 

PHYSICAL EXAMINATION

GENERAL: Physical examination reveals a 77-year-old lady, emaciated, sort of

weak, gaunt, grayish-appearing, normocephalic.

HEAD: No trauma to the head.

EYES: Pupils equally reactive.  Extraocular muscles intact.

NECK: Bilateral carotid pulses and bilateral carotid bruits about 3-6.

CHEST: Bilateral breath sounds, decreased over both lung fields.  The patient

clearly has advanced COPD.

HEART: Appears to be regular rhythm.

ABDOMEN: Soft.  No rebound or guarding.  No masses.

EXTREMITIES: The patient does not have any palpable pulses.  She has

dopplerable femoral pulses bilateral, dopplerable weak popliteal pulses

bilateral and then dopplerable dorsalis pedis posterior tibial on the left, on

the right only posterior tibial.

Foot is cyanotic on the right with decreased capillary refill and obviously

involved with severe peripheral vascular changes. On the left the patient has

also decreased capillary refill but less prominent.

NEUROLOGIC: Neurologically grossly the patient is intact.  Moving all four

extremities.  However, does not say much and is a very poor historian.

 

IMPRESSION AND RECOMMENDATIONS

Patient with severe peripheral vascular changes, manifested right more than

left. On the CTA with runoff the patient has near occlusion of both common

femoral arteries and then occlusion of the SFAs bilateral with reconstitution of

popliteals. She has two-vessel runoff on each side and on the right side

anterior tibial I do not see actually.  This is combination of arthrosclerotic 
disease 

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 

I won't know this till surgery. At this point the patient has bigger and serious

problems with urosepsis. As soon as that is resolved somewhat, the patient may

be a candidate for vascular reconstruction. This is nothing that we can do

endovascularly, so the patient may need a fem-pop bypass, however, we have to

get her in better shape.  She has to be hydrated, prepared, infection

eradicated. I will continue to follow the patient with you and when she is

ready we will go ahead with it. Critical care time 40 minutes.

 

 

 

                              _________________________________

                              Amber JOHNSON/TOÑOL

D:  12/18/2017/12:54 PM

T:  12/18/2017/1:01 PM

Visit #:  [id number]

Job #:  [id number]

CATHERINE

## 2017-12-19 VITALS
RESPIRATION RATE: 20 BRPM | HEART RATE: 80 BPM | SYSTOLIC BLOOD PRESSURE: 119 MMHG | OXYGEN SATURATION: 99 % | TEMPERATURE: 96 F | DIASTOLIC BLOOD PRESSURE: 85 MMHG

## 2017-12-19 VITALS
OXYGEN SATURATION: 92 % | SYSTOLIC BLOOD PRESSURE: 129 MMHG | DIASTOLIC BLOOD PRESSURE: 67 MMHG | TEMPERATURE: 97.3 F | RESPIRATION RATE: 16 BRPM | HEART RATE: 91 BPM

## 2017-12-19 VITALS
TEMPERATURE: 98.5 F | SYSTOLIC BLOOD PRESSURE: 131 MMHG | HEART RATE: 87 BPM | DIASTOLIC BLOOD PRESSURE: 65 MMHG | RESPIRATION RATE: 18 BRPM | OXYGEN SATURATION: 92 %

## 2017-12-19 VITALS
RESPIRATION RATE: 16 BRPM | SYSTOLIC BLOOD PRESSURE: 124 MMHG | DIASTOLIC BLOOD PRESSURE: 60 MMHG | OXYGEN SATURATION: 94 % | HEART RATE: 85 BPM | TEMPERATURE: 98 F

## 2017-12-19 VITALS
SYSTOLIC BLOOD PRESSURE: 137 MMHG | HEART RATE: 89 BPM | TEMPERATURE: 96.2 F | OXYGEN SATURATION: 93 % | DIASTOLIC BLOOD PRESSURE: 70 MMHG | RESPIRATION RATE: 20 BRPM

## 2017-12-19 VITALS
OXYGEN SATURATION: 94 % | TEMPERATURE: 97.7 F | DIASTOLIC BLOOD PRESSURE: 74 MMHG | RESPIRATION RATE: 16 BRPM | HEART RATE: 81 BPM | SYSTOLIC BLOOD PRESSURE: 137 MMHG

## 2017-12-19 LAB
ACANTHOCYTES BLD QL SMEAR: (no result)
ALBUMIN SERPL-MCNC: 2.3 GM/DL (ref 3.4–5)
ALP SERPL-CCNC: 79 U/L (ref 45–117)
ALT SERPL-CCNC: 9 U/L (ref 10–53)
AST SERPL-CCNC: 20 U/L (ref 15–37)
BASOPHILS # BLD AUTO: 0.2 TH/MM3 (ref 0–0.2)
BASOPHILS NFR BLD: 0.8 % (ref 0–2)
BILIRUB SERPL-MCNC: 0.7 MG/DL (ref 0.2–1)
BUN SERPL-MCNC: 8 MG/DL (ref 7–18)
CALCIUM SERPL-MCNC: 8.6 MG/DL (ref 8.5–10.1)
CHLORIDE SERPL-SCNC: 99 MEQ/L (ref 98–107)
CREAT SERPL-MCNC: 0.6 MG/DL (ref 0.5–1)
EOSINOPHIL # BLD: 0.4 TH/MM3 (ref 0–0.4)
EOSINOPHIL NFR BLD: 1.9 % (ref 0–4)
ERYTHROCYTE [DISTWIDTH] IN BLOOD BY AUTOMATED COUNT: 18.6 % (ref 11.6–17.2)
GFR SERPLBLD BASED ON 1.73 SQ M-ARVRAT: 97 ML/MIN (ref 89–?)
GLUCOSE SERPL-MCNC: 105 MG/DL (ref 74–106)
HBA1C MFR BLD: 5.5 % (ref 4.3–6)
HCO3 BLD-SCNC: 26.5 MEQ/L (ref 21–32)
HCT VFR BLD CALC: 51.3 % (ref 35–46)
HGB BLD-MCNC: 17.3 GM/DL (ref 11.6–15.3)
LYMPHOCYTES # BLD AUTO: 1.3 TH/MM3 (ref 1–4.8)
LYMPHOCYTES NFR BLD AUTO: 6.3 % (ref 9–44)
LYMPHOCYTES: 6 % (ref 9–44)
MAGNESIUM SERPL-MCNC: 1.9 MG/DL (ref 1.5–2.5)
MCH RBC QN AUTO: 23 PG (ref 27–34)
MCHC RBC AUTO-ENTMCNC: 33.6 % (ref 32–36)
MCV RBC AUTO: 68.4 FL (ref 80–100)
MONOCYTE #: 1.1 TH/MM3 (ref 0–0.9)
MONOCYTES NFR BLD: 5.5 % (ref 0–8)
MONOCYTES: 4 % (ref 0–8)
MYELOCYTES NFR BLD: 1 % (ref 0–0)
NEUTROPHILS # BLD AUTO: 17.2 TH/MM3 (ref 1.8–7.7)
NEUTROPHILS NFR BLD AUTO: 85.5 % (ref 16–70)
NEUTS BAND # BLD MANUAL: 18.1 TH/MM3 (ref 1.8–7.7)
NEUTS BAND NFR BLD: 9 % (ref 0–6)
NEUTS SEG NFR BLD MANUAL: 80 % (ref 16–70)
PHOSPHATE SERPL-MCNC: 2.8 MG/DL (ref 2.5–4.9)
PLATELET # BLD: 568 TH/MM3 (ref 150–450)
PMV BLD AUTO: 8.8 FL (ref 7–11)
PROT SERPL-MCNC: 5.7 GM/DL (ref 6.4–8.2)
RBC # BLD AUTO: 7.5 MIL/MM3 (ref 4–5.3)
SODIUM SERPL-SCNC: 135 MEQ/L (ref 136–145)
T4 FREE SERPL-MCNC: 1.9 NG/DL (ref 0.76–1.46)
WBC # BLD AUTO: 20.1 TH/MM3 (ref 4–11)
WBC TOXIC VACUOLES BLD QL SMEAR: PRESENT

## 2017-12-19 RX ADMIN — Medication SCH ML: at 09:00

## 2017-12-19 RX ADMIN — SODIUM CHLORIDE SCH MLS/HR: 900 INJECTION INTRAVENOUS at 16:28

## 2017-12-19 RX ADMIN — STANDARDIZED SENNA CONCENTRATE AND DOCUSATE SODIUM SCH TAB: 8.6; 5 TABLET, FILM COATED ORAL at 09:03

## 2017-12-19 RX ADMIN — HEPARIN SODIUM PRN MLS/HR: 10000 INJECTION, SOLUTION INTRAVENOUS at 09:00

## 2017-12-19 RX ADMIN — STANDARDIZED SENNA CONCENTRATE AND DOCUSATE SODIUM SCH TAB: 8.6; 5 TABLET, FILM COATED ORAL at 21:00

## 2017-12-19 RX ADMIN — Medication SCH ML: at 21:00

## 2017-12-19 RX ADMIN — CLOPIDOGREL BISULFATE SCH MG: 75 TABLET, FILM COATED ORAL at 09:03

## 2017-12-19 NOTE — HHI.PR
Subjective


Remarks


Patient is 77-year-old female with PMH of HTN, HLD brought into the emergency 

department via EMS for evaluation of generalized weakness.  Patient is a poor 

historian. Per their report she has been laying on her couch for the last 7 

days unable to get up because she has been weak.  Apparently either patient or 

daughter called 911.  Patient states that she has not been cleaned in several 

days.  Patient states she has not been eating well.  EMS reported that living 

conditions were suboptimal at best.  Patient smelled of feces and urine on 

arrival. Further work up reveals UTI  with sepsis. Started on abb after 

cultures obtained. 


Patient also was noted with discolored LE bluish discoloration, cold LE , and 

no pulses detected by US. Ao run off ordered and also vascular surgeon 

consulted for further eval.





12-18 DW DR SANTOS WILL NEED SURGERY LATER THIS WEEK


DW RN AND PT AND CM 


NO CURRENT COMPLAINTS


HAS PATINO IN PLACE


AM LABS


WILL NEED SNF AT [place]





12-19 has been seen by palliative care, they are  not able to locate any family 

at this time yet


Patient will need surgery likely femoropopliteal bypass


Discussed with patient and RN


Complains of some pain in the right lower extremity


A.m. labs


Continue on Rocephin for UTI





Objective


Vitals





Vital Signs








  Date Time  Temp Pulse Resp B/P (MAP) Pulse Ox O2 Delivery O2 Flow Rate FiO2


 


12/19/17 12:00 98.5 87 18 131/65 (87) 92   


 


12/19/17 08:00 97.7 81 16 137/74 (95) 94   


 


12/19/17 04:00 96.2 89 20 137/70 (92) 93   


 


12/19/17 00:00 96.0 80 20 119/85 (96) 99   


 


12/18/17 20:00 97.4 92 16 119/64 (82) 94   


 


12/18/17 16:00 98.4 84 17 110/59 (76) 92   














I/O      


 


 12/18/17 12/18/17 12/18/17 12/19/17 12/19/17 12/19/17





 07:00 15:00 23:00 07:00 15:00 23:00


 


Intake Total 800 ml  1140 ml 240 ml  


 


Output Total 1800 ml  475 ml   


 


Balance -1000 ml  665 ml 240 ml  


 


      


 


Intake Oral   1040 ml 240 ml  


 


IV Total 800 ml  100 ml   


 


Output Urine Total 1800 ml  475 ml   


 


# Voids    50  


 


# Bowel Movements 0  1   








Result Diagram:  


12/19/17 0343                                                                  

              12/19/17 0343





Other Results





 Laboratory Tests








Test


  12/17/17


12:00 12/17/17


12:05 12/17/17


13:20 12/18/17


04:40


 


Urine Color LIGHT-RED    


 


Urine Turbidity HAZY    


 


Urine pH 5.5    


 


Urine Specific Gravity 1.024    


 


Urine Protein 30 mg/dL    


 


Urine Glucose (UA) NEG mg/dL    


 


Urine Ketones 10 mg/dL    


 


Urine Occult Blood NEG    


 


Urine Nitrite POS    


 


Urine Bilirubin NEG    


 


Urine Urobilinogen 2.0 MG/DL    


 


Urine Leukocyte Esterase MOD    


 


Urine RBC 3 /hpf    


 


Urine WBC 21 /hpf    


 


Urine Squamous Epithelial


Cells 2 /hpf 


  


  


  


 


 


Urine Amorphous Sediment OCC    


 


Urine Bacteria MANY /hpf    


 


Urine Mucus MANY /lpf    


 


Microscopic Urinalysis Comment


  CATH-CULTURE


IND 


  


  


 


 


White Blood Count  23.2 TH/MM3   26.4 TH/MM3 


 


Red Blood Count  8.36 MIL/MM3   8.12 MIL/MM3 


 


Hemoglobin  19.3 GM/DL   18.2 GM/DL 


 


Hematocrit  59.6 %   56.4 % 


 


Mean Corpuscular Volume  68.8 FL   69.5 FL 


 


Mean Corpuscular Hemoglobin  22.3 PG   22.5 PG 


 


Mean Corpuscular Hemoglobin


Concent 


  32.4 % 


  


  32.3 % 


 


 


Red Cell Distribution Width  18.8 %   18.4 % 


 


Platelet Count  675 TH/MM3   620 TH/MM3 


 


Mean Platelet Volume  8.2 FL   8.5 FL 


 


Neutrophils (%) (Auto)  90.5 %   91.9 % 


 


Lymphocytes (%) (Auto)  3.1 %   2.5 % 


 


Monocytes (%) (Auto)  5.4 %   4.7 % 


 


Eosinophils (%) (Auto)  0.9 %   0.8 % 


 


Basophils (%) (Auto)  0.1 %   0.1 % 


 


Neutrophils # (Auto)  21.0 TH/MM3   24.3 TH/MM3 


 


Lymphocytes # (Auto)  0.7 TH/MM3   0.7 TH/MM3 


 


Monocytes # (Auto)  1.2 TH/MM3   1.2 TH/MM3 


 


Eosinophils # (Auto)  0.2 TH/MM3   0.2 TH/MM3 


 


Basophils # (Auto)  0.0 TH/MM3   0.0 TH/MM3 


 


CBC Comment  AUTO DIFF   AUTO DIFF 


 


Differential Comment


  


  AUTO DIFF


CONFIRMED 


  FINAL DIFF


MANUAL


 


Toxic Vacuolation  PRESENT   PRESENT 


 


Platelet Estimate  HIGH   HIGH 


 


Platelet Morphology Comment  ENLARGED   ENLARGED 


 


Prothrombin Time  17.5 SEC   


 


Prothromb Time International


Ratio 


  1.7 RATIO 


  


  


 


 


Activated Partial


Thromboplast Time 


  29.9 SEC 


  


  


 


 


Lactic Acid Level  1.3 mmol/L   


 


Blood Urea Nitrogen   11 MG/DL  6 MG/DL 


 


Creatinine   0.73 MG/DL  0.62 MG/DL 


 


Random Glucose   88 MG/DL  104 MG/DL 


 


Total Protein   7.1 GM/DL  


 


Albumin   2.3 GM/DL  


 


Calcium Level   8.1 MG/DL  8.5 MG/DL 


 


Magnesium Level   1.9 MG/DL  1.9 MG/DL 


 


Alkaline Phosphatase   100 U/L  


 


Aspartate Amino Transf


(AST/SGOT) 


  


  18 U/L 


  


 


 


Alanine Aminotransferase


(ALT/SGPT) 


  


  9 U/L 


  


 


 


Total Bilirubin   0.9 MG/DL  


 


Sodium Level   136 MEQ/L  136 MEQ/L 


 


Potassium Level   3.6 MEQ/L  3.6 MEQ/L 


 


Chloride Level   102 MEQ/L  103 MEQ/L 


 


Carbon Dioxide Level   27.9 MEQ/L  21.0 MEQ/L 


 


Anion Gap   6 MEQ/L  12 MEQ/L 


 


Estimat Glomerular Filtration


Rate 


  


  78 ML/MIN 


  94 ML/MIN 


 


 


Total Creatine Kinase   81 U/L  


 


Differential Total Cells


Counted 


  


  


  100 


 


 


Neutrophils % (Manual)    75 % 


 


Band Neutrophils %    15 % 


 


Lymphocytes %    2 % 


 


Monocytes %    6 % 


 


Eosinophils %    2 % 


 


Neutrophils # (Manual)    23.8 TH/MM3 


 


Toxic Granulation    1+ 


 


B-Type Natriuretic Peptide    2380 PG/ML 


 


Thyroid Stimulating Hormone


3rd Gen 


  


  


  0.737 uIU/ML 


 


 


Test


  12/18/17


06:48 12/18/17


13:55 12/18/17


15:02 12/18/17


18:44


 


Prothrombin Time 14.7 SEC    


 


Prothromb Time International


Ratio 1.5 RATIO 


  


  


  


 


 


Activated Partial


Thromboplast Time 32.0 SEC 


  44.6 SEC 


  36.6 SEC 


  36.4 SEC 


 


 


Test


  12/19/17


03:43 12/19/17


11:05 


  


 


 


White Blood Count 20.1 TH/MM3    


 


Red Blood Count 7.50 MIL/MM3    


 


Hemoglobin 17.3 GM/DL    


 


Hematocrit 51.3 %    


 


Mean Corpuscular Volume 68.4 FL    


 


Mean Corpuscular Hemoglobin 23.0 PG    


 


Mean Corpuscular Hemoglobin


Concent 33.6 % 


  


  


  


 


 


Red Cell Distribution Width 18.6 %    


 


Platelet Count 568 TH/MM3    


 


Mean Platelet Volume 8.8 FL    


 


Neutrophils (%) (Auto) 85.5 %    


 


Lymphocytes (%) (Auto) 6.3 %    


 


Monocytes (%) (Auto) 5.5 %    


 


Eosinophils (%) (Auto) 1.9 %    


 


Basophils (%) (Auto) 0.8 %    


 


Neutrophils # (Auto) 17.2 TH/MM3    


 


Lymphocytes # (Auto) 1.3 TH/MM3    


 


Monocytes # (Auto) 1.1 TH/MM3    


 


Eosinophils # (Auto) 0.4 TH/MM3    


 


Basophils # (Auto) 0.2 TH/MM3    


 


CBC Comment AUTO DIFF    


 


Differential Total Cells


Counted 100 


  


  


  


 


 


Neutrophils % (Manual) 80 %    


 


Band Neutrophils % 9 %    


 


Lymphocytes % 6 %    


 


Monocytes % 4 %    


 


Neutrophils # (Manual) 18.1 TH/MM3    


 


Myelocytes 1 %    


 


Differential Comment


  FINAL DIFF


MANUAL 


  


  


 


 


Atypical Lymphocytes  %    


 


Toxic Vacuolation PRESENT    


 


Platelet Estimate HIGH    


 


Platelet Morphology Comment ENLARGED    


 


Crenated Cell 1+    


 


Acanthocytes OCC    


 


Activated Partial


Thromboplast Time 53.0 SEC 


  38.6 SEC 


  


  


 


 


Blood Urea Nitrogen 8 MG/DL    


 


Creatinine 0.60 MG/DL    


 


Random Glucose 105 MG/DL    


 


Total Protein 5.7 GM/DL    


 


Albumin 2.3 GM/DL    


 


Calcium Level 8.6 MG/DL    


 


Phosphorus Level 2.8 MG/DL    


 


Magnesium Level 1.9 MG/DL    


 


Alkaline Phosphatase 79 U/L    


 


Aspartate Amino Transf


(AST/SGOT) 20 U/L 


  


  


  


 


 


Alanine Aminotransferase


(ALT/SGPT) 9 U/L 


  


  


  


 


 


Total Bilirubin 0.7 MG/DL    


 


Sodium Level 135 MEQ/L    


 


Potassium Level 3.3 MEQ/L    


 


Chloride Level 99 MEQ/L    


 


Carbon Dioxide Level 26.5 MEQ/L    


 


Anion Gap 10 MEQ/L    


 


Estimat Glomerular Filtration


Rate 97 ML/MIN 


  


  


  


 


 


Free Thyroxine 1.90 NG/DL    


 


Thyroid Stimulating Hormone


3rd Gen 1.180 uIU/ML 


  


  


  


 








Imaging





Last Impressions








Head CT 12/18/17 0000 Signed





Impressions: 





 Service Date/Time:  Monday, December 18, 2017 05:41 - CONCLUSION:  1. 

Bilateral 





 suspected areas of encephalomalacia being more prominent on the right. 2. 





 Suspected small vessel ischemic change throughout the white matter. 3. 

Atrophy. 





 4. Acute area of hemorrhage or mass effect is not seen.     Emir Sorensen MD 


 


Chest X-Ray 12/18/17 0000 Signed





Impressions: 





 Service Date/Time:  Monday, December 18, 2017 02:34 - CONCLUSION:  Increased 





 density at the right base likely representing mild atelectasis or 

consolidation. 





     Emir Sorensen MD 


 


Lower Extremity Ultrasound 12/17/17 0000 Signed





Impressions: 





 Service Date/Time:  Sunday, December 17, 2017 20:48 - CONCLUSION: No DVT.     





 Emir Sorensen MD 


 


Aorta w/Runoff CTA 12/17/17 0000 Signed





Impressions: 





 Service Date/Time:  Sunday, December 17, 2017 17:47 - CONCLUSION:  1. 





 Atherosclerotic changes seen throughout the arterial system, including 





 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 

prominent 





 mural thrombus. 2. Atherosclerotic change in the external iliac and common 





 femoral arteries bilaterally as described above.  3. Occlusion of the 





 superficial femoral arteries bilaterally with reconstitution distally.  The 





 reconstitution is higher on the left side.  4. Normal trifurcation vessels 

seen 





 on the left side. 5. Diminished flow seen at the right trifurcation vessels.  





 The vessels can only be well seen on the delayed images.  The vessels can not 

be 





 traced into the foot.           Emir Sorensen MD 








Objective Remarks


GENERAL: Awake and alert very soft-spoken but somewhat confused appears stated 

age some confusion


SKIN: Cool and dry.  Decreased warmth in bilateral lower extremities


HEAD: Atraumatic. Normocephalic. 


EYES: Pupils equal and round. No scleral icterus. No injection or drainage. 


ENT: No nasal bleeding or discharge.  Mucous membranes pink and moist.  Tongue 

is midline


NECK: Trachea midline. No JVD.  Supple


CARDIOVASCULAR: Regular rate and rhythm.  S1-S2 no S3 or S4 no heave or thrill 

or rub or gallop


RESPIRATORY: No accessory muscle use. Clear to auscultation. Breath sounds 

equal bilaterally. 


GASTROINTESTINAL: Abdomen soft, non-tender, nondistended. Hepatic and splenic 

margins not palpable. 


MUSCULOSKELETAL: Extremities without clubbing or edema. No obvious deformities.

  Has some decreased peripheral pulses and some cyanosis in bilateral lower 

extremities


NEUROLOGICAL: Awake and alert. No obvious cranial nerve deficits.  Motor 

grossly within normal limits.  4 out of 5 muscle strength in the arms and legs.

  Normal speech.


PSYCHIATRIC: INAppropriate mood and affect; insight and judgment ABnormal.


Procedures











KASHIF THAPA











 **Signed**





 EXAM DATE/TIME:  12/17/2017 17:47 


 


[facility] COMPARISON:     


No previous studies available for comparison.


 


 


INDICATIONS :     


Right leg pain; evaluate for occlusion.


                      


 


IV CONTRAST:     


100 cc Omnipaque 350 (iohexol) IV 


 


 


RADIATION DOSE:     


2.3 CTDIvol (mGy) 


 


 


MEDICAL HISTORY :     


Cerebrovascular disease.  


 


SURGICAL HISTORY :      


Tubal ligation. 


 


ENCOUNTER:     


Initial


 


ACUITY:     


1 month


 


PAIN SCALE:     


7/10


 


LOCATION:      


Right lower leg


 


TECHNIQUE:     


Volumetric scanning was performed using a multi-row detector CT scanner.  The 

data was post processed with a variety of visualization algorithms including 

full volume maximum intensity projection, multi-planar sliding thin slab 

reformation, curved planar reformation, and surface rendering techniques.  

Using automated exposure control and adjustment of the mA and/or kV according 

to patient size, radiation dose was kept as low as reasonably achievable to 

obtain optimal diagnostic quality images.   DICOM format image data is 

available electronically for review and comparison.  


 


FINDINGS:     


There are atherosclerotic changes seen throughout the arterial system.  The 

abdominal aorta measures up to 2.9 cm.  At the distal infrarenal abdominal 

aorta thrombus occupies more than half of the lumen.  The functional lumen is 

seen at the left lateral aspect of the distal abdominal aorta.  There is 

atherosclerotic change at the common iliac arteries bilaterally.  A significant 

stenosis is not seen at this level is not seen.  There does appear to be severe 

stenosis and possible occlusion at the origin of the right internal iliac 

artery.  There is mild plaque seen at the right external iliac artery.  There 

is a severe stenosis at the distal left external iliac artery.


 


There is atherosclerotic change seen at the common femoral arteries bilaterally 

being more severe on the right.  There is a severe stenosis at the right common 

femoral artery narrowing the lumen by approximately 80%.  There is severe 

narrowing of the proximal right profunda femoris artery.  There is abrupt 

occlusion of the proximal right superficial femoral artery.  Flow in the thigh 

is continues through collaterals in the profunda femoris artery distribution.  

There is reconstitution of the distal popliteal artery at the level of the 

distal femur via collaterals.  There is a normal trifurcation.  The right 

anterior tibial artery can be traced to the ankle.  The posterior tibial and 

peritoneal arteries can be seen to the distal lower leg but can not clearly be 

traced into the foot.  


 


Again noted is the mild stenosis at the left common femoral artery.  There is 

occlusion of the proximal left superficial femoral artery.  The popliteal 

artery is patent throughout.  There is reconstitution of the distal superficial 

femoral artery at the level of the distal femoral shaft.  The popliteal artery 

is patent.  There is a severe stenosis at the mid popliteal artery.  The lumen 

is narrowed by over 75%.  The more distal popliteal artery is patent.  The 

anterior and posterior tibial artery could be traced to the foot.  The 

peritoneal artery can be traced to the ankle region.  


 


Atherosclerotic calcifications are seen throughout the origins of the vessels 

in the upper abdomen.  There does appear to be at least a moderate stenosis at 

the origin of the right renal artery.  The left renal artery appears patent.  

The celiac, SMA and DMITRI are patent.  


 


There is a mild hiatal hernia.  The liver, spleen, pancreas and kidneys appear 

grossly normal.  The adrenal glands are grossly normal.  The pelvic structures 

appear intact.  The patient does have a Patino catheter in the urinary bladder.  

There is degenerative change in the lumbar spine.  


 


CONCLUSION:


1. Atherosclerotic changes seen throughout the arterial system, including 

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 

mural thrombus.


2. Atherosclerotic change in the external iliac and common femoral arteries 

bilaterally as described above. 


3. Occlusion of the superficial femoral arteries bilaterally with 

reconstitution distally.  The reconstitution is higher on the left side. 


4. Normal trifurcation vessels seen on the left side.


5. Diminished flow seen at the right trifurcation vessels.  The vessels can 

only be well seen on the delayed images.  The vessels can not be traced into 

the foot.      


 


 


 


 Emir Sorensen MD on December 17, 2017 at 19:28                


Board Certified Radiologist.


 This report was verified electronically.


Medications and IVs





Current Medications


Sodium Chloride 1,000 ml @  1,000 mls/hr Q1H ONCE IV  Last administered on 12/17 /17at 13:04;  Start 12/17/17 at 11:57;  Stop 12/17/17 at 12:56;  Status DC


Sodium Chloride 800 ml @  1,000 mls/hr Q48M ONCE IV  Last administered on 12/17/ 17at 13:04;  Start 12/17/17 at 11:57;  Stop 12/17/17 at 12:44;  Status DC


Piperacillin Sod/ Tazobactam Sod 100 ml @  200 mls/hr ONCE  STAT IV  Last 

administered on 12/17/17at 13:11;  Start 12/17/17 at 12:52;  Stop 12/17/17 at 13

:21;  Status DC


Vancomycin HCl 1000 mg/Sodium Chloride 250 ml @  250 mls/hr ONCE  ONCE IV  Last 

administered on 12/17/17at 14:07;  Start 12/17/17 at 13:15;  Stop 12/17/17 at 14

:14;  Status DC


Ceftriaxone Sodium 1000 mg/ Sodium Chloride 100 ml @  200 mls/hr Q24H IV  Last 

administered on 12/18/17at 16:22;  Start 12/17/17 at 16:00


Sodium Chloride 1,000 ml @  100 mls/hr Q10H IV  Last administered on 12/17/17at 

16:00;  Start 12/17/17 at 16:00;  Stop 12/18/17 at 03:41;  Status DC


Sodium Chloride (NS Flush) 2 ml UNSCH  PRN IV FLUSH FLUSH AFTER USING IV ACCESS

;  Start 12/17/17 at 16:00


Sodium Chloride (NS Flush) 2 ml BID IV FLUSH  Last administered on 12/18/17at 22

:32;  Start 12/17/17 at 21:00


Acetaminophen (Tylenol) 650 mg Q4H  PRN PO TEMP > 100.4;  Start 12/17/17 at 16:

00


Ondansetron HCl (Zofran Inj) 4 mg Q6H  PRN IVP NAUSEA OR VOMITING;  Start 12/17/ 17 at 16:00


Temazepam (Restoril) 15 mg HS  PRN PO INSOMNIA;  Start 12/17/17 at 16:00


Enoxaparin Sodium (Lovenox Inj) 40 mg Q24H SQ  Last administered on 12/17/17at 

18:47;  Start 12/17/17 at 17:00;  Stop 12/18/17 at 12:27;  Status DC


Naloxone HCl (Narcan Inj) 0.4 mg UNSCH  PRN IV PUSH SEE LABEL COMMENTS;  Start 

12/17/17 at 16:00


Senna/Docusate Sodium (Tiarra-Colace) 1 tab BID PO  Last administered on 12/19/ 17at 09:03;  Start 12/17/17 at 21:00


Magnesium Hydroxide (Milk Of Magnesia Liq) 30 ml Q12H  PRN PO Mild constipation

;  Start 12/17/17 at 16:00


Sennosides (Senokot) 17.2 mg Q12H  PRN PO Moderate constipation;  Start 12/17/ 17 at 16:00


Bisacodyl (Dulcolax Supp) 10 mg DAILY  PRN RECTAL SEVERE CONSITIPATION;  Start 

12/17/17 at 16:00


Lactulose (Lactulose Liq) 30 ml DAILY  PRN PO SEVERE CONSITIPATION;  Start 12/17 /17 at 16:00


Iohexol (Omnipaque 350 Inj) 100 ml STK-MED ONCE IVCONTRAST  Last administered 

on 12/17/17at 18:02;  Start 12/17/17 at 18:02;  Stop 12/17/17 at 18:07;  Status 

DC


Clonidine (Catapres) 0.1 mg ONCE  ONCE PO  Last administered on 12/17/17at 22:05

;  Start 12/17/17 at 21:15;  Stop 12/17/17 at 21:26;  Status DC


Furosemide (Lasix Inj) 40 mg ONCE  ONCE IV PUSH  Last administered on 12/18/ 17at 04:03;  Start 12/18/17 at 03:45;  Stop 12/18/17 at 03:47;  Status DC


Albuterol/ Ipratropium (Duoneb Neb) 1 ampule Q4HR NEB  PRN NEB SOB/WHEEZING;  

Start 12/18/17 at 04:00


Heparin Sodium/ Dextrose 250 ml @  10 mls/hr TITRATE  PRN IV Coagulation 

Management Last administered on 12/19/17at 09:00;  Start 12/18/17 at 06:30


Clopidogrel Bisulfate (Plavix) 75 mg DAILY PO  Last administered on 12/19/17at 

09:03;  Start 12/18/17 at 12:00


Enoxaparin Sodium (Lovenox Inj) 40 mg Q24H SQ ;  Start 12/18/17 at 17:00;  Stop 

12/18/17 at 17:00;  Status DC





A/P


Assessment and Plan


Bilateral LE discoloration and unable to feel DP pulses. Patient with pain and 

cold extremities. Will do CTA Ao run off and will consult vascular surgery 

discussed with Dr. SANTOS WILL NEED SURGERY will need a femoropopliteal bypass


Started on antibiotic Rocephin IV . Received vanco and zosyn in the ED. 

Received bolus of NS in the eD. Continue IVF. Monitor VS closely. 


Urine cultures shows Escherichia coli UTI sensitive to Rocephin which she is on


Initial EKG shows sinus tachycardia with occasional PVCs.  Rate is 111, this 

was reviewed 


Chest x-ray shows no acute disease


Restart home meds as appropriate patient says she currently doesn't take any 

meds. 


Repeat labs cbc, CMP tomorrow 


Consult PT for eval CONSULT OT


Consult case management for DC plan 


DVT ppx lovenox


History of cerebrovascular accident with with sounds like probable chronic 

confusion


Gait instability possibly secondary to cerebrovascular accident versus 

peripheral vascular occlusive disease


Deconditioning Will need SNF and PT and OT


Discharge Planning


WILL NEED SNF AT [place]











Froilan Marrufo DO Dec 19, 2017 14:49

## 2017-12-19 NOTE — ECHRPT
Indication:   pre op for vascualr surgery

 

 CONCLUSIONS

 The left ventricular systolic function is normal with an estimated ejection fraction in the range of
 55-60%. 

 Trace mitral valve regurgitation. 

 

 BP:  137   / 70      HR: 96                       Rhythm:           Sinus

 

 MEASUREMENTS  (Male / Female) Normal Values       Technical Quality:Fair

 2D ECHO

 LV Diastolic Diameter PLAX        4.1 cm                4.2 - 5.9 / 3.9 - 5.3 cm

 LV Systolic Diameter PLAX         3.0 cm                

 IVS Diastolic Thickness           0.9 cm                0.6 - 1.0 / 0.6 - 0.9 cm

 LVPW Diastolic Thickness          0.9 cm                0.6 - 1.0 / 0.6 - 0.9 cm

 LV Relative Wall Thickness        0.4                   

 LVOT Diameter                     1.7 cm                

 

 M-MODE

 Aortic Root Diameter MM           2.8 cm                

 LA Systolic Diameter MM           2.9 cm                

 LA Ao Ratio MM                    1.0                   

 

 DOPPLER

 AV Peak Velocity                  117.0 cm/s            

 AV Peak Gradient                  5.5 mmHg              

 LVOT Peak Velocity                78.0 cm/s             

 LVOT Peak Gradient                2.4 mmHg              

 AV Area Cont Eq pk                1.5 cm               

 Mitral E Point Velocity           92.8 cm/s             

 Mitral A Point Velocity           86.4 cm/s             

 Mitral E to A Ratio               1.1                   

 LV E' Lateral Velocity            10.2 cm/s             

 Mitral E to LV E' Lateral Ratio   9.1                   

 LV E' Septal Velocity             4.7 cm/s              

 Mitral E to LV E' Septal Ratio    19.8                  

 PV Peak Velocity                  105.0 cm/s            

 PV Peak Gradient                  4.4 mmHg              

 

 

 FINDINGS

 

 LEFT VENTRICLE

 The left ventricular systolic function is normal with an estimated ejection fraction in the range of
 55-60%. 

 Wall thickness is normal. 

 Normal left ventricular size. 

 There was limited left ventricular wall motion assessment due to poor endocardial visualization. 

 

 RIGHT VENTRICLE

 Grossly normal right ventricle.

 

 LEFT ATRIUM

 The left atrial size is normal.  

 

 RIGHT ATRIUM

 The right atrial size is normal.  

 

 ATRIAL SEPTUM

 Normal atrial septal thickness. 

 

 AORTA

 The aortic root and proximal ascending aorta are not well visualized. 

 

 MITRAL VALVE

 Structurally normal mitral valve. 

 Mild mitral annular calcification. 

 Trace mitral valve regurgitation. 

 No mitral valve stenosis. 

 

 AORTIC VALVE

 Probable trileaflet aortic valve.

 Mild thickening of the left coronary cusp

 No aortic valve regurgitation. 

 No aortic valve stenosis. 

 

 TRICUSPID VALVE

 Structurally normal tricuspid valve. No tricuspid valve stenosis or regurgitation.  

 

 PULMONARY VALVE

 The pulmonary valve is not well visualized.  

 

 VESSELS

 The inferior vena cava is normal in size.  

 

 PERICARDIUM

 No pericardial effusion.  

 

 

 

 

  Olvin West DO

  (Electronically Signed)

  Final Date:19 December 2017 16:25

## 2017-12-19 NOTE — HHI.HCPN
Palliative care consulted to assist with goals of care. No contact information 

for patient's daughter (Mariam Jameson) or other family. Completed social 

media and google search. Google search produced possible phone number , #[phone]. Attempted to contact, no answer, number just rings then rings busy/

disconnected. 





Accurints possibly needed to locate family members and contact information.











Nusrat Dixon, JESSICA Dec 19, 2017 10:03

## 2017-12-19 NOTE — PD.CAR.PN
CVT Progress Note


Subjective/Hospital Course:


Referral received


Full consult to follow


Severe for peripheral vascular disease will require reconstruction


Valentin PEREZ


12/19/17


As noted in my consultation this patient has bilateral severe peripheral 

vascular disease


On top of that she is being treated for urosepsis and general decline


Patient has not been in the hospital for 3 days and has gradually improved for 

her urosepsis based on clinical exam level of alertness and diagnostic studies


Despite heparin drip the right leg is looking worse and worse and patient now 

has ischemia that needs attention in the resolution


In the best of worlds I would like to wait another while to do the surgery 

considering patient's other issues but that this point if surgery is not 

performed patient is a very high risk of losing her leg


I have reviewed patient's echocardiogram and studies and she is at this point 

moderate risk for vascular surgery but I believe options a limited and we have 

to go ahead with it.


Therefore patient will be scheduled for common femoral endarterectomy and 

femoropopliteal bypass tomorrow


I explained the risks and benefits of the procedure and all patient is very 

quiet she is clearly awake and alert and understands the discussion


Have tried to reach her daughter however number is apparently disconnected


Objective:





Vital Signs








  Date Time  Temp Pulse Resp B/P (MAP) Pulse Ox O2 Delivery O2 Flow Rate FiO2


 


12/19/17 16:00 97.3 91 16 129/67 (87) 92   


 


12/19/17 12:00 98.5 87 18 131/65 (87) 92   


 


12/19/17 08:00 97.7 81 16 137/74 (95) 94   


 


12/19/17 04:00 96.2 89 20 137/70 (92) 93   


 


12/19/17 00:00 96.0 80 20 119/85 (96) 99   


 


12/18/17 20:00 97.4 92 16 119/64 (82) 94   








Labs:





Laboratory Tests








Test


  12/19/17


11:05 12/19/17


16:54


 


Activated Partial


Thromboplast Time 38.6 SEC


(24.3-30.1) 37.8 SEC


(24.3-30.1)








Result Diagram:  


12/19/17 0343                                                                  

              12/19/17 0343














Amber Acosta MD Dec 19, 2017 18:41

## 2017-12-20 VITALS
HEART RATE: 80 BPM | DIASTOLIC BLOOD PRESSURE: 62 MMHG | TEMPERATURE: 97.8 F | RESPIRATION RATE: 16 BRPM | OXYGEN SATURATION: 98 % | SYSTOLIC BLOOD PRESSURE: 121 MMHG

## 2017-12-20 VITALS
TEMPERATURE: 98.1 F | HEART RATE: 98 BPM | RESPIRATION RATE: 16 BRPM | SYSTOLIC BLOOD PRESSURE: 146 MMHG | DIASTOLIC BLOOD PRESSURE: 70 MMHG | OXYGEN SATURATION: 92 %

## 2017-12-20 VITALS
RESPIRATION RATE: 16 BRPM | SYSTOLIC BLOOD PRESSURE: 127 MMHG | HEART RATE: 77 BPM | OXYGEN SATURATION: 96 % | DIASTOLIC BLOOD PRESSURE: 63 MMHG | TEMPERATURE: 96.4 F

## 2017-12-20 VITALS
OXYGEN SATURATION: 93 % | HEART RATE: 65 BPM | RESPIRATION RATE: 20 BRPM | TEMPERATURE: 97.6 F | SYSTOLIC BLOOD PRESSURE: 150 MMHG | DIASTOLIC BLOOD PRESSURE: 85 MMHG

## 2017-12-20 VITALS
HEART RATE: 88 BPM | TEMPERATURE: 98.1 F | OXYGEN SATURATION: 95 % | SYSTOLIC BLOOD PRESSURE: 146 MMHG | RESPIRATION RATE: 16 BRPM | DIASTOLIC BLOOD PRESSURE: 75 MMHG

## 2017-12-20 LAB
ALBUMIN SERPL-MCNC: 2.2 GM/DL (ref 3.4–5)
ALP SERPL-CCNC: 71 U/L (ref 45–117)
ALT SERPL-CCNC: 9 U/L (ref 10–53)
AST SERPL-CCNC: 14 U/L (ref 15–37)
BASOPHILS # BLD AUTO: 0.1 TH/MM3 (ref 0–0.2)
BASOPHILS NFR BLD AUTO: 1 % (ref 0–2)
BASOPHILS NFR BLD: 0.8 % (ref 0–2)
BILIRUB SERPL-MCNC: 0.4 MG/DL (ref 0.2–1)
BUN SERPL-MCNC: 12 MG/DL (ref 7–18)
CALCIUM SERPL-MCNC: 8.5 MG/DL (ref 8.5–10.1)
CHLORIDE SERPL-SCNC: 101 MEQ/L (ref 98–107)
CREAT SERPL-MCNC: 0.61 MG/DL (ref 0.5–1)
EOSINOPHIL # BLD: 0.5 TH/MM3 (ref 0–0.4)
EOSINOPHIL NFR BLD: 3 % (ref 0–4)
ERYTHROCYTE [DISTWIDTH] IN BLOOD BY AUTOMATED COUNT: 18.2 % (ref 11.6–17.2)
GFR SERPLBLD BASED ON 1.73 SQ M-ARVRAT: 95 ML/MIN (ref 89–?)
GLUCOSE SERPL-MCNC: 108 MG/DL (ref 74–106)
HCO3 BLD-SCNC: 29.4 MEQ/L (ref 21–32)
HCT VFR BLD CALC: 53.2 % (ref 35–46)
HGB BLD-MCNC: 17.1 GM/DL (ref 11.6–15.3)
LYMPHOCYTES # BLD AUTO: 1.5 TH/MM3 (ref 1–4.8)
LYMPHOCYTES NFR BLD AUTO: 8.6 % (ref 9–44)
LYMPHOCYTES: 12 % (ref 9–44)
MAGNESIUM SERPL-MCNC: 2 MG/DL (ref 1.5–2.5)
MCH RBC QN AUTO: 21.9 PG (ref 27–34)
MCHC RBC AUTO-ENTMCNC: 32.1 % (ref 32–36)
MCV RBC AUTO: 68.4 FL (ref 80–100)
METAMYELOCYTES NFR BLD: 1 % (ref 0–1)
MONOCYTE #: 0.9 TH/MM3 (ref 0–0.9)
MONOCYTES NFR BLD: 5.3 % (ref 0–8)
MONOCYTES: 1 % (ref 0–8)
NEUTROPHILS # BLD AUTO: 14.3 TH/MM3 (ref 1.8–7.7)
NEUTROPHILS NFR BLD AUTO: 82.3 % (ref 16–70)
NEUTS BAND # BLD MANUAL: 15 TH/MM3 (ref 1.8–7.7)
NEUTS BAND NFR BLD: 15 % (ref 0–6)
NEUTS SEG NFR BLD MANUAL: 70 % (ref 16–70)
PHOSPHATE SERPL-MCNC: 3.4 MG/DL (ref 2.5–4.9)
PLATELET # BLD: 627 TH/MM3 (ref 150–450)
PMV BLD AUTO: 8.9 FL (ref 7–11)
PROT SERPL-MCNC: 5.5 GM/DL (ref 6.4–8.2)
RBC # BLD AUTO: 7.78 MIL/MM3 (ref 4–5.3)
SODIUM SERPL-SCNC: 137 MEQ/L (ref 136–145)
WBC # BLD AUTO: 17.4 TH/MM3 (ref 4–11)

## 2017-12-20 RX ADMIN — HEPARIN SODIUM PRN MLS/HR: 10000 INJECTION, SOLUTION INTRAVENOUS at 15:38

## 2017-12-20 RX ADMIN — CLOPIDOGREL BISULFATE SCH MG: 75 TABLET, FILM COATED ORAL at 08:30

## 2017-12-20 RX ADMIN — Medication SCH ML: at 08:30

## 2017-12-20 RX ADMIN — SODIUM CHLORIDE SCH MLS/HR: 900 INJECTION INTRAVENOUS at 15:40

## 2017-12-20 RX ADMIN — STANDARDIZED SENNA CONCENTRATE AND DOCUSATE SODIUM SCH TAB: 8.6; 5 TABLET, FILM COATED ORAL at 08:30

## 2017-12-20 RX ADMIN — STANDARDIZED SENNA CONCENTRATE AND DOCUSATE SODIUM SCH TAB: 8.6; 5 TABLET, FILM COATED ORAL at 22:36

## 2017-12-20 RX ADMIN — Medication SCH ML: at 22:37

## 2017-12-20 NOTE — HHI.HCPN
Reason for visit


   a.  To assist with evaluation and management of symptoms including: right 

lower extremity pain; dyspnea; confusion; generalized weakness


   b.  To assist medical decision maker(s) with: better understanding of 

current medical conditions; weighing benefits/burdens of medical treatment 

options; making medical treatment decisions.


.





Subjective/Interval History


Patient remains confused.  Could not remember date, month year.  When ask when 

did she come to [place], she stated 1926.   Told patient date a dn ask patient to 

remember date.  Ask patient to spell the word "WORLD" and then backwards.  

Reacess if she could remember date, and she could not.  





Patient remains to be painful especially int he right ext, and patient would 

need vascular surgery.  Cardiac consult started.


We have still been unable to find family.


Family/friend interactions


unable to find family.





Advance Directives


Living Will:  Never completed


Health Care Surrogate:  Never completed


Durable Power of :  Never completed


Advance Directive Specifics


Date completed:


According to the patient, she has never completed an advanced directive.


.


Health Care Surrogate(s):


According to patient, she has never designated in writing a health care 

surrogate.


.


Documented care wishes:


Per the patient, she has no written documentation of her healthcare preferences/

goals/wishes.


.





Objective





Vital Signs








  Date Time  Temp Pulse Resp B/P (MAP) Pulse Ox O2 Delivery O2 Flow Rate FiO2


 


12/20/17 07:54 97.6 65 20 150/85 (106) 93   


 


12/20/17 04:00 96.4 77 16 127/63 (84) 96   


 


12/20/17 00:00 97.8 80 16 121/62 (81) 98   


 


12/19/17 20:00 98.0 85 16 124/60 (81) 94   


 


12/19/17 16:00 97.3 91 16 129/67 (87) 92   














Intake & Output  


 


 12/20/17 12/20/17





 07:00 19:00


 


Intake Total 280 ml 


 


Output Total 800 ml 


 


Balance -520 ml 


 


  


 


Intake Oral 280 ml 


 


Output Urine Total 800 ml 


 


# Bowel Movements 0 








Physical Exam


CONSTITUTIONAL/GENERAL: This is a thin, pale, elderly female who is in no 

apparent distress until one touches her right lower extremity or she moves that 

extremity.  Disoriented to time, and place.


TUBES/LINES/DRAINS: Arevalo catheter; peripheral IV


SKIN: No jaundice, rashes, or lesions.  Ischemic changes in the right foot.  No 

wounds seen anteriorly. Skin temperature appropriate. Not diaphoretic. 


HEAD: Atraumatic. Normocephalic.


EYES: Pupils equal and round and reactive. Extraocular motions intact. No 

scleral icterus. No injection or drainage. Fundi not examined.


ENT: Hearing grossly normal. Nose without bleeding or purulent drainage. Throat 

without visible erythema, exudates, masses, or lesions.


NECK: Trachea midline. Supple, nontender. No palpable thyroid enlargement or 

nodularity. 


CARDIOVASCULAR: Normal rate; irregular rhythm.  No murmurs, gallops, or rubs. 

No JVD.  Unable to palpate extremities in the ankles and feet.


RESPIRATORY/CHEST: Symmetric, unlabored respirations. Breath sounds equal 

bilaterally but diminished throughout.. No wheezes, rales, or rhonchi.  


GASTROINTESTINAL: Abdomen soft, non-tender, nondistended. No hepato-splenomegaly

, or palpable masses. No guarding. Bowel sounds present.


GENITOURINARY: Without palpable bladder distension. Arevalo catheter in place.


MUSCULOSKELETAL: Extremities without clubbing or edema.  Right foot is cyanotic 

appearing.  Decreased capillary refill in both feet.  Entire right calf is 

exquisitely tender to touch without palpable cord.  


LYMPHATICS: No palpable cervical or supraclavicular adenopathy.


NEUROLOGICAL: Awake and alert. Motor and sensory grossly within normal limits. 

Follows commands.  Disoriented to time.  Confused.  Moves all extremities.


PSYCHIATRIC: No obvious anxiety/depression. no apparent hallucinations or other 

psychotic thought process.


.





Diagnostic Tests


Laboratory





Laboratory Tests








Test


  12/18/17


04:40 12/18/17


06:48 12/18/17


13:55 12/18/17


15:02


 


White Blood Count


  26.4 TH/MM3


(4.0-11.0) 


  


  


 


 


Red Blood Count


  8.12 MIL/MM3


(4.00-5.30) 


  


  


 


 


Hemoglobin


  18.2 GM/DL


(11.6-15.3) 


  


  


 


 


Hematocrit


  56.4 %


(35.0-46.0) 


  


  


 


 


Mean Corpuscular Volume


  69.5 FL


(80.0-100.0) 


  


  


 


 


Mean Corpuscular Hemoglobin


  22.5 PG


(27.0-34.0) 


  


  


 


 


Mean Corpuscular Hemoglobin


Concent 32.3 %


(32.0-36.0) 


  


  


 


 


Red Cell Distribution Width


  18.4 %


(11.6-17.2) 


  


  


 


 


Platelet Count


  620 TH/MM3


(150-450) 


  


  


 


 


Mean Platelet Volume


  8.5 FL


(7.0-11.0) 


  


  


 


 


Neutrophils (%) (Auto)


  91.9 %


(16.0-70.0) 


  


  


 


 


Lymphocytes (%) (Auto)


  2.5 %


(9.0-44.0) 


  


  


 


 


Monocytes (%) (Auto)


  4.7 %


(0.0-8.0) 


  


  


 


 


Eosinophils (%) (Auto)


  0.8 %


(0.0-4.0) 


  


  


 


 


Basophils (%) (Auto)


  0.1 %


(0.0-2.0) 


  


  


 


 


Neutrophils # (Auto)


  24.3 TH/MM3


(1.8-7.7) 


  


  


 


 


Lymphocytes # (Auto)


  0.7 TH/MM3


(1.0-4.8) 


  


  


 


 


Monocytes # (Auto)


  1.2 TH/MM3


(0-0.9) 


  


  


 


 


Eosinophils # (Auto)


  0.2 TH/MM3


(0-0.4) 


  


  


 


 


Basophils # (Auto)


  0.0 TH/MM3


(0-0.2) 


  


  


 


 


CBC Comment AUTO DIFF    


 


Differential Total Cells


Counted 100 


  


  


  


 


 


Neutrophils % (Manual) 75 % (16-70)    


 


Band Neutrophils % 15 % (0-6)    


 


Lymphocytes % 2 % (9-44)    


 


Monocytes % 6 % (0-8)    


 


Eosinophils % 2 % (0-4)    


 


Neutrophils # (Manual)


  23.8 TH/MM3


(1.8-7.7) 


  


  


 


 


Differential Comment


  FINAL DIFF


MANUAL 


  


  


 


 


Toxic Granulation 1+ (NORMAL)    


 


Toxic Vacuolation


  PRESENT (NONE


SEEN) 


  


  


 


 


Platelet Estimate HIGH (NORMAL)    


 


Platelet Morphology Comment


  ENLARGED


(NORMAL) 


  


  


 


 


Blood Urea Nitrogen 6 MG/DL (7-18)    


 


Creatinine


  0.62 MG/DL


(0.50-1.00) 


  


  


 


 


Random Glucose


  104 MG/DL


() 


  


  


 


 


Calcium Level


  8.5 MG/DL


(8.5-10.1) 


  


  


 


 


Magnesium Level


  1.9 MG/DL


(1.5-2.5) 


  


  


 


 


Sodium Level


  136 MEQ/L


(136-145) 


  


  


 


 


Potassium Level


  3.6 MEQ/L


(3.5-5.1) 


  


  


 


 


Chloride Level


  103 MEQ/L


() 


  


  


 


 


Carbon Dioxide Level


  21.0 MEQ/L


(21.0-32.0) 


  


  


 


 


Anion Gap


  12 MEQ/L


(5-15) 


  


  


 


 


Estimat Glomerular Filtration


Rate 94 ML/MIN


(>89) 


  


  


 


 


B-Type Natriuretic Peptide


  2380 PG/ML


(0-100) 


  


  


 


 


Thyroid Stimulating Hormone


3rd Gen 0.737 uIU/ML


(0.358-3.740) 


  


  


 


 


Prothrombin Time


  


  14.7 SEC


(9.8-11.6) 


  


 


 


Prothromb Time International


Ratio 


  1.5 RATIO 


  


  


 


 


Activated Partial


Thromboplast Time 


  32.0 SEC


(24.3-30.1) 44.6 SEC


(24.3-30.1) 36.6 SEC


(24.3-30.1)


 


Test


  12/18/17


18:44 12/19/17


03:43 12/19/17


11:05 12/19/17


16:54


 


Activated Partial


Thromboplast Time 36.4 SEC


(24.3-30.1) 53.0 SEC


(24.3-30.1) 38.6 SEC


(24.3-30.1) 37.8 SEC


(24.3-30.1)


 


White Blood Count


  


  20.1 TH/MM3


(4.0-11.0) 


  


 


 


Red Blood Count


  


  7.50 MIL/MM3


(4.00-5.30) 


  


 


 


Hemoglobin


  


  17.3 GM/DL


(11.6-15.3) 


  


 


 


Hematocrit


  


  51.3 %


(35.0-46.0) 


  


 


 


Mean Corpuscular Volume


  


  68.4 FL


(80.0-100.0) 


  


 


 


Mean Corpuscular Hemoglobin


  


  23.0 PG


(27.0-34.0) 


  


 


 


Mean Corpuscular Hemoglobin


Concent 


  33.6 %


(32.0-36.0) 


  


 


 


Red Cell Distribution Width


  


  18.6 %


(11.6-17.2) 


  


 


 


Platelet Count


  


  568 TH/MM3


(150-450) 


  


 


 


Mean Platelet Volume


  


  8.8 FL


(7.0-11.0) 


  


 


 


Neutrophils (%) (Auto)


  


  85.5 %


(16.0-70.0) 


  


 


 


Lymphocytes (%) (Auto)


  


  6.3 %


(9.0-44.0) 


  


 


 


Monocytes (%) (Auto)


  


  5.5 %


(0.0-8.0) 


  


 


 


Eosinophils (%) (Auto)


  


  1.9 %


(0.0-4.0) 


  


 


 


Basophils (%) (Auto)


  


  0.8 %


(0.0-2.0) 


  


 


 


Neutrophils # (Auto)


  


  17.2 TH/MM3


(1.8-7.7) 


  


 


 


Lymphocytes # (Auto)


  


  1.3 TH/MM3


(1.0-4.8) 


  


 


 


Monocytes # (Auto)


  


  1.1 TH/MM3


(0-0.9) 


  


 


 


Eosinophils # (Auto)


  


  0.4 TH/MM3


(0-0.4) 


  


 


 


Basophils # (Auto)


  


  0.2 TH/MM3


(0-0.2) 


  


 


 


CBC Comment  AUTO DIFF   


 


Differential Total Cells


Counted 


  100 


  


  


 


 


Neutrophils % (Manual)  80 % (16-70)   


 


Band Neutrophils %  9 % (0-6)   


 


Lymphocytes %  6 % (9-44)   


 


Monocytes %  4 % (0-8)   


 


Neutrophils # (Manual)


  


  18.1 TH/MM3


(1.8-7.7) 


  


 


 


Myelocytes  1 % (0-0)   


 


Differential Comment


  


  FINAL DIFF


MANUAL 


  


 


 


Atypical Lymphocytes   % (0-0)   


 


Toxic Vacuolation


  


  PRESENT (NONE


SEEN) 


  


 


 


Platelet Estimate  HIGH (NORMAL)   


 


Platelet Morphology Comment


  


  ENLARGED


(NORMAL) 


  


 


 


Crenated Cell  1+ (NORMAL)   


 


Acanthocytes  OCC (NORMAL)   


 


Blood Urea Nitrogen  8 MG/DL (7-18)   


 


Creatinine


  


  0.60 MG/DL


(0.50-1.00) 


  


 


 


Random Glucose


  


  105 MG/DL


() 


  


 


 


Total Protein


  


  5.7 GM/DL


(6.4-8.2) 


  


 


 


Albumin


  


  2.3 GM/DL


(3.4-5.0) 


  


 


 


Calcium Level


  


  8.6 MG/DL


(8.5-10.1) 


  


 


 


Phosphorus Level


  


  2.8 MG/DL


(2.5-4.9) 


  


 


 


Magnesium Level


  


  1.9 MG/DL


(1.5-2.5) 


  


 


 


Alkaline Phosphatase


  


  79 U/L


() 


  


 


 


Aspartate Amino Transf


(AST/SGOT) 


  20 U/L (15-37) 


  


  


 


 


Alanine Aminotransferase


(ALT/SGPT) 


  9 U/L (10-53) 


  


  


 


 


Total Bilirubin


  


  0.7 MG/DL


(0.2-1.0) 


  


 


 


Sodium Level


  


  135 MEQ/L


(136-145) 


  


 


 


Potassium Level


  


  3.3 MEQ/L


(3.5-5.1) 


  


 


 


Chloride Level


  


  99 MEQ/L


() 


  


 


 


Carbon Dioxide Level


  


  26.5 MEQ/L


(21.0-32.0) 


  


 


 


Anion Gap


  


  10 MEQ/L


(5-15) 


  


 


 


Estimat Glomerular Filtration


Rate 


  97 ML/MIN


(>89) 


  


 


 


Hemoglobin A1c


  


  5.5 %


(4.3-6.0) 


  


 


 


Free Thyroxine


  


  1.90 NG/DL


(0.76-1.46) 


  


 


 


Thyroid Stimulating Hormone


3rd Gen 


  1.180 uIU/ML


(0.358-3.740) 


  


 


 


Test


  12/20/17


01:44 12/20/17


10:52 


  


 


 


White Blood Count


  17.4 TH/MM3


(4.0-11.0) 


  


  


 


 


Red Blood Count


  7.78 MIL/MM3


(4.00-5.30) 


  


  


 


 


Hemoglobin


  17.1 GM/DL


(11.6-15.3) 


  


  


 


 


Hematocrit


  53.2 %


(35.0-46.0) 


  


  


 


 


Mean Corpuscular Volume


  68.4 FL


(80.0-100.0) 


  


  


 


 


Mean Corpuscular Hemoglobin


  21.9 PG


(27.0-34.0) 


  


  


 


 


Mean Corpuscular Hemoglobin


Concent 32.1 %


(32.0-36.0) 


  


  


 


 


Red Cell Distribution Width


  18.2 %


(11.6-17.2) 


  


  


 


 


Platelet Count


  627 TH/MM3


(150-450) 


  


  


 


 


Mean Platelet Volume


  8.9 FL


(7.0-11.0) 


  


  


 


 


Neutrophils (%) (Auto)


  82.3 %


(16.0-70.0) 


  


  


 


 


Lymphocytes (%) (Auto)


  8.6 %


(9.0-44.0) 


  


  


 


 


Monocytes (%) (Auto)


  5.3 %


(0.0-8.0) 


  


  


 


 


Eosinophils (%) (Auto)


  3.0 %


(0.0-4.0) 


  


  


 


 


Basophils (%) (Auto)


  0.8 %


(0.0-2.0) 


  


  


 


 


Neutrophils # (Auto)


  14.3 TH/MM3


(1.8-7.7) 


  


  


 


 


Lymphocytes # (Auto)


  1.5 TH/MM3


(1.0-4.8) 


  


  


 


 


Monocytes # (Auto)


  0.9 TH/MM3


(0-0.9) 


  


  


 


 


Eosinophils # (Auto)


  0.5 TH/MM3


(0-0.4) 


  


  


 


 


Basophils # (Auto)


  0.1 TH/MM3


(0-0.2) 


  


  


 


 


CBC Comment AUTO DIFF    


 


Differential Total Cells


Counted 100 


  


  


  


 


 


Neutrophils % (Manual) 70 % (16-70)    


 


Band Neutrophils % 15 % (0-6)    


 


Lymphocytes % 12 % (9-44)    


 


Monocytes % 1 % (0-8)    


 


Basophils % 1 % (0-2)    


 


Neutrophils # (Manual)


  15.0 TH/MM3


(1.8-7.7) 


  


  


 


 


Metamyelocytes 1 % (0-1)    


 


Differential Comment


  FINAL DIFF


MANUAL 


  


  


 


 


Platelet Estimate HIGH (NORMAL)    


 


Platelet Morphology Comment


  ENLARGED


(NORMAL) 


  


  


 


 


Activated Partial


Thromboplast Time 60.5 SEC


(24.3-30.1) 29.4 SEC


(24.3-30.1) 


  


 


 


Blood Urea Nitrogen


  12 MG/DL


(7-18) 


  


  


 


 


Creatinine


  0.61 MG/DL


(0.50-1.00) 


  


  


 


 


Random Glucose


  108 MG/DL


() 


  


  


 


 


Total Protein


  5.5 GM/DL


(6.4-8.2) 


  


  


 


 


Albumin


  2.2 GM/DL


(3.4-5.0) 


  


  


 


 


Calcium Level


  8.5 MG/DL


(8.5-10.1) 


  


  


 


 


Phosphorus Level


  3.4 MG/DL


(2.5-4.9) 


  


  


 


 


Magnesium Level


  2.0 MG/DL


(1.5-2.5) 


  


  


 


 


Alkaline Phosphatase


  71 U/L


() 


  


  


 


 


Aspartate Amino Transf


(AST/SGOT) 14 U/L (15-37) 


  


  


  


 


 


Alanine Aminotransferase


(ALT/SGPT) 9 U/L (10-53) 


  


  


  


 


 


Total Bilirubin


  0.4 MG/DL


(0.2-1.0) 


  


  


 


 


Sodium Level


  137 MEQ/L


(136-145) 


  


  


 


 


Potassium Level


  3.3 MEQ/L


(3.5-5.1) 


  


  


 


 


Chloride Level


  101 MEQ/L


() 


  


  


 


 


Carbon Dioxide Level


  29.4 MEQ/L


(21.0-32.0) 


  


  


 


 


Anion Gap 7 MEQ/L (5-15)    


 


Estimat Glomerular Filtration


Rate 95 ML/MIN


(>89) 


  


  


 








Result Diagram:  


12/20/17 0144                                                                  

              12/20/17 0144





Imaging





Last Impressions








Head CT 12/18/17 0000 Signed





Impressions: 





 Service Date/Time:  Monday, December 18, 2017 05:41 - CONCLUSION:  1. 

Bilateral 





 suspected areas of encephalomalacia being more prominent on the right. 2. 





 Suspected small vessel ischemic change throughout the white matter. 3. 

Atrophy. 





 4. Acute area of hemorrhage or mass effect is not seen.     Emir Sorensen MD 


 


Chest X-Ray 12/18/17 0000 Signed





Impressions: 





 Service Date/Time:  Monday, December 18, 2017 02:34 - CONCLUSION:  Increased 





 density at the right base likely representing mild atelectasis or 

consolidation. 





     Emir Sorensen MD 


 


Lower Extremity Ultrasound 12/17/17 0000 Signed





Impressions: 





 Service Date/Time:  Sunday, December 17, 2017 20:48 - CONCLUSION: No DVT.     





 Emir Sorensen MD 


 


Aorta w/Runoff CTA 12/17/17 0000 Signed





Impressions: 





 Service Date/Time:  Sunday, December 17, 2017 17:47 - CONCLUSION:  1. 





 Atherosclerotic changes seen throughout the arterial system, including 





 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 

prominent 





 mural thrombus. 2. Atherosclerotic change in the external iliac and common 





 femoral arteries bilaterally as described above.  3. Occlusion of the 





 superficial femoral arteries bilaterally with reconstitution distally.  The 





 reconstitution is higher on the left side.  4. Normal trifurcation vessels 

seen 





 on the left side. 5. Diminished flow seen at the right trifurcation vessels.  





 The vessels can only be well seen on the delayed images.  The vessels can not 

be 





 traced into the foot.           Emir Sorensen MD 











Assessment and Plan


Disease Oriented Problem List:  


(1) Sepsis


(2) UTI (urinary tract infection)


(3) Peripheral vascular disease


(4) Microcytosis


(5) Polycythemia


(6) Thrombocytosis


(7) Hypoalbuminemia


(8) Elevated brain natriuretic peptide (BNP) level


(9) Stroke


Symptom Scale:  


(1) Confusion


0-10 Scale:  Unable to quantify


Comment:  See assessment





(2) Pain


0-10 Scale:  Unable to quantify


Comment:  See assessment





(3) Cough


0-10 Scale:  Unable to quantify





(4) Dyspnea


0-10 Scale:  Unable to quantify


Comment:  See assessment





(5) Generalized weakness


0-10 Scale:  Unable to quantify


Comment:  See assessment





Pertinent Non-Medical Issues


Psychosocial:  Reportedly lives with daughter in crowded, cluttered apartment. 

6 children altogether but patient can't tell me what state they are in.


Spiritual:  [employer]ism. Not particularly interested in chaplaincy visits.


Legal: No known advance directives. 


Ethical issues impacting care: Patient's capacity is questionable. Recommend at 

least shared decision making until she is cognitively clearer or we see that 

goals/preferences are consistent. 


.


Important Contacts


Mariam Jameson (daughter)  :   ? phone number


.


Prognosis


It is unclear to what extent patient's current status is due to her peripheral 

vascular disease and to what extent there are other factors. Might some of her 

confusion be due to vascular dementia (she is post stroke).  She has elevated Hg

, elevated platelets, and microcytosis.  Does she have polycythemia?  Will she 

get well enough to be a candidate for vascular surgery? 





If she decides she does NOT want surgery, then she will likely remain bedbound 

and continue to lose weight.  In that case life expectancy would likely be less 

than 6 months. 


.


Code Status:  Full Code


Plan


==  Decision making:  Patient still does not have capacity to make medical 

decisions.





==  Code Status:  FULL CODE -- 


==  Goals of medical treatment:  Given the above, I recommend that goals remain 

aggressive until family could be found.





==  Symptoms:


* Right lower extremity pain:  This appears to be entirely due to ischemia.  It 

is worse with movement.  Most of her pain is in the calf.  She denies joint 

pain.  She can't quantify the pain.  Just touching the right calf is painful 

for her.  Patient currently has no scheduled pain medications ordered.  Pt 

would need surgery.   PRN available 


* Generalized weakness:  Both weakness and right lower extremity pain seemed to 

make her bedbound in the days leading up to hospital admission.   She still has 

some residual weakness from her stroke in 2012.


* Confusion:  Unclear to what extent this is acute due to her clinical 

condition or is more chronic. Will re-assess


==  She has hematologic findings that may need further evaluation -- elevated Hg

, microcytosis, thrombocytosis.  Might she have polycythemia?  Evaluation might 

be important in deciding if she would be a candidate for vascular surgery and 

her life expectancy with or without the surgery.  





== Will have palliative care  collaborate with case management to 

try and get contact information for daughter or any other children.





==  If patient becomes cognitively clear, we will try and get her to complete 

health care surrogate forms and we will re-visit resuscitation wishes.  If she 

doesn't clear, we need to identify the appropriate proxy or proxies and speak 

to them about resuscitation status and goals.





==  Palliative care will continue to follow to assist with symptom management 

and to further clarify goals of medical treatment as the clinical course 

evolves. 


.d/w with surgeon.











Oumar Preston MD Dec 20, 2017 15:06

## 2017-12-20 NOTE — HHI.PR
Subjective


Remarks


Patient is 77-year-old female with PMH of HTN, HLD brought into the emergency 

department via EMS for evaluation of generalized weakness.  Patient is a poor 

historian. Per their report she has been laying on her couch for the last 7 

days unable to get up because she has been weak.  Apparently either patient or 

daughter called 911.  Patient states that she has not been cleaned in several 

days.  Patient states she has not been eating well.  EMS reported that living 

conditions were suboptimal at best.  Patient smelled of feces and urine on 

arrival. Further work up reveals UTI  with sepsis. Started on abb after 

cultures obtained. 


Patient also was noted with discolored LE bluish discoloration, cold LE , and 

no pulses detected by US. Ao run off ordered and also vascular surgeon 

consulted for further eval.





12-18 DW DR SANTOS WILL NEED SURGERY LATER THIS WEEK


DW RN AND PT AND CM 


NO CURRENT COMPLAINTS


HAS PATINO IN PLACE


AM LABS


WILL NEED SNF AT [place]





12-19 has been seen by palliative care, they are  not able to locate any family 

at this time yet


Patient will need surgery likely femoropopliteal bypass


Discussed with patient and RN


Complains of some pain in the right lower extremity


A.m. labs


Continue on Rocephin for UTI





12-20 WAS TO HAVE SURGERY BUT WAS NOT CLEARED BY ANESTHESIA HAD ABNORMAL EKG


SO SURGERY WAS PUT ON HOLD AND CARDIOLOGY WAS CONSULTED


RK RN AND PT AND CM





Objective


Vitals





Vital Signs








  Date Time  Temp Pulse Resp B/P (MAP) Pulse Ox O2 Delivery O2 Flow Rate FiO2


 


12/20/17 07:54 97.6 65 20 150/85 (106) 93   


 


12/20/17 04:00 96.4 77 16 127/63 (84) 96   


 


12/20/17 00:00 97.8 80 16 121/62 (81) 98   


 


12/19/17 20:00 98.0 85 16 124/60 (81) 94   


 


12/19/17 16:00 97.3 91 16 129/67 (87) 92   














I/O      


 


 12/19/17 12/19/17 12/19/17 12/20/17 12/20/17 12/20/17





 07:00 15:00 23:00 07:00 15:00 23:00


 


Intake Total 240 ml  480 ml 280 ml  


 


Output Total   500 ml 800 ml  


 


Balance 240 ml  -20 ml -520 ml  


 


      


 


Intake Oral 240 ml  480 ml 280 ml  


 


Output Urine Total   500 ml 800 ml  


 


# Voids 50     


 


# Bowel Movements   1 0  








Result Diagram:  


12/20/17 0144                                                                  

              12/20/17 0144





Other Results





 Laboratory Tests








Test


  12/18/17


04:40 12/18/17


06:48 12/18/17


13:55 12/18/17


15:02


 


White Blood Count 26.4 TH/MM3    


 


Red Blood Count 8.12 MIL/MM3    


 


Hemoglobin 18.2 GM/DL    


 


Hematocrit 56.4 %    


 


Mean Corpuscular Volume 69.5 FL    


 


Mean Corpuscular Hemoglobin 22.5 PG    


 


Mean Corpuscular Hemoglobin


Concent 32.3 % 


  


  


  


 


 


Red Cell Distribution Width 18.4 %    


 


Platelet Count 620 TH/MM3    


 


Mean Platelet Volume 8.5 FL    


 


Neutrophils (%) (Auto) 91.9 %    


 


Lymphocytes (%) (Auto) 2.5 %    


 


Monocytes (%) (Auto) 4.7 %    


 


Eosinophils (%) (Auto) 0.8 %    


 


Basophils (%) (Auto) 0.1 %    


 


Neutrophils # (Auto) 24.3 TH/MM3    


 


Lymphocytes # (Auto) 0.7 TH/MM3    


 


Monocytes # (Auto) 1.2 TH/MM3    


 


Eosinophils # (Auto) 0.2 TH/MM3    


 


Basophils # (Auto) 0.0 TH/MM3    


 


CBC Comment AUTO DIFF    


 


Differential Total Cells


Counted 100 


  


  


  


 


 


Neutrophils % (Manual) 75 %    


 


Band Neutrophils % 15 %    


 


Lymphocytes % 2 %    


 


Monocytes % 6 %    


 


Eosinophils % 2 %    


 


Neutrophils # (Manual) 23.8 TH/MM3    


 


Differential Comment


  FINAL DIFF


MANUAL 


  


  


 


 


Toxic Granulation 1+    


 


Toxic Vacuolation PRESENT    


 


Platelet Estimate HIGH    


 


Platelet Morphology Comment ENLARGED    


 


Blood Urea Nitrogen 6 MG/DL    


 


Creatinine 0.62 MG/DL    


 


Random Glucose 104 MG/DL    


 


Calcium Level 8.5 MG/DL    


 


Magnesium Level 1.9 MG/DL    


 


Sodium Level 136 MEQ/L    


 


Potassium Level 3.6 MEQ/L    


 


Chloride Level 103 MEQ/L    


 


Carbon Dioxide Level 21.0 MEQ/L    


 


Anion Gap 12 MEQ/L    


 


Estimat Glomerular Filtration


Rate 94 ML/MIN 


  


  


  


 


 


B-Type Natriuretic Peptide 2380 PG/ML    


 


Thyroid Stimulating Hormone


3rd Gen 0.737 uIU/ML 


  


  


  


 


 


Prothrombin Time  14.7 SEC   


 


Prothromb Time International


Ratio 


  1.5 RATIO 


  


  


 


 


Activated Partial


Thromboplast Time 


  32.0 SEC 


  44.6 SEC 


  36.6 SEC 


 


 


Test


  12/18/17


18:44 12/19/17


03:43 12/19/17


11:05 12/19/17


16:54


 


Activated Partial


Thromboplast Time 36.4 SEC 


  53.0 SEC 


  38.6 SEC 


  37.8 SEC 


 


 


White Blood Count  20.1 TH/MM3   


 


Red Blood Count  7.50 MIL/MM3   


 


Hemoglobin  17.3 GM/DL   


 


Hematocrit  51.3 %   


 


Mean Corpuscular Volume  68.4 FL   


 


Mean Corpuscular Hemoglobin  23.0 PG   


 


Mean Corpuscular Hemoglobin


Concent 


  33.6 % 


  


  


 


 


Red Cell Distribution Width  18.6 %   


 


Platelet Count  568 TH/MM3   


 


Mean Platelet Volume  8.8 FL   


 


Neutrophils (%) (Auto)  85.5 %   


 


Lymphocytes (%) (Auto)  6.3 %   


 


Monocytes (%) (Auto)  5.5 %   


 


Eosinophils (%) (Auto)  1.9 %   


 


Basophils (%) (Auto)  0.8 %   


 


Neutrophils # (Auto)  17.2 TH/MM3   


 


Lymphocytes # (Auto)  1.3 TH/MM3   


 


Monocytes # (Auto)  1.1 TH/MM3   


 


Eosinophils # (Auto)  0.4 TH/MM3   


 


Basophils # (Auto)  0.2 TH/MM3   


 


CBC Comment  AUTO DIFF   


 


Differential Total Cells


Counted 


  100 


  


  


 


 


Neutrophils % (Manual)  80 %   


 


Band Neutrophils %  9 %   


 


Lymphocytes %  6 %   


 


Monocytes %  4 %   


 


Neutrophils # (Manual)  18.1 TH/MM3   


 


Myelocytes  1 %   


 


Differential Comment


  


  FINAL DIFF


MANUAL 


  


 


 


Atypical Lymphocytes   %   


 


Toxic Vacuolation  PRESENT   


 


Platelet Estimate  HIGH   


 


Platelet Morphology Comment  ENLARGED   


 


Crenated Cell  1+   


 


Acanthocytes  OCC   


 


Blood Urea Nitrogen  8 MG/DL   


 


Creatinine  0.60 MG/DL   


 


Random Glucose  105 MG/DL   


 


Total Protein  5.7 GM/DL   


 


Albumin  2.3 GM/DL   


 


Calcium Level  8.6 MG/DL   


 


Phosphorus Level  2.8 MG/DL   


 


Magnesium Level  1.9 MG/DL   


 


Alkaline Phosphatase  79 U/L   


 


Aspartate Amino Transf


(AST/SGOT) 


  20 U/L 


  


  


 


 


Alanine Aminotransferase


(ALT/SGPT) 


  9 U/L 


  


  


 


 


Total Bilirubin  0.7 MG/DL   


 


Sodium Level  135 MEQ/L   


 


Potassium Level  3.3 MEQ/L   


 


Chloride Level  99 MEQ/L   


 


Carbon Dioxide Level  26.5 MEQ/L   


 


Anion Gap  10 MEQ/L   


 


Estimat Glomerular Filtration


Rate 


  97 ML/MIN 


  


  


 


 


Hemoglobin A1c  5.5 %   


 


Free Thyroxine  1.90 NG/DL   


 


Thyroid Stimulating Hormone


3rd Gen 


  1.180 uIU/ML 


  


  


 


 


Test


  12/20/17


01:44 12/20/17


10:52 


  


 


 


White Blood Count 17.4 TH/MM3    


 


Red Blood Count 7.78 MIL/MM3    


 


Hemoglobin 17.1 GM/DL    


 


Hematocrit 53.2 %    


 


Mean Corpuscular Volume 68.4 FL    


 


Mean Corpuscular Hemoglobin 21.9 PG    


 


Mean Corpuscular Hemoglobin


Concent 32.1 % 


  


  


  


 


 


Red Cell Distribution Width 18.2 %    


 


Platelet Count 627 TH/MM3    


 


Mean Platelet Volume 8.9 FL    


 


Neutrophils (%) (Auto) 82.3 %    


 


Lymphocytes (%) (Auto) 8.6 %    


 


Monocytes (%) (Auto) 5.3 %    


 


Eosinophils (%) (Auto) 3.0 %    


 


Basophils (%) (Auto) 0.8 %    


 


Neutrophils # (Auto) 14.3 TH/MM3    


 


Lymphocytes # (Auto) 1.5 TH/MM3    


 


Monocytes # (Auto) 0.9 TH/MM3    


 


Eosinophils # (Auto) 0.5 TH/MM3    


 


Basophils # (Auto) 0.1 TH/MM3    


 


CBC Comment AUTO DIFF    


 


Differential Total Cells


Counted 100 


  


  


  


 


 


Neutrophils % (Manual) 70 %    


 


Band Neutrophils % 15 %    


 


Lymphocytes % 12 %    


 


Monocytes % 1 %    


 


Basophils % 1 %    


 


Neutrophils # (Manual) 15.0 TH/MM3    


 


Metamyelocytes 1 %    


 


Differential Comment


  FINAL DIFF


MANUAL 


  


  


 


 


Platelet Estimate HIGH    


 


Platelet Morphology Comment ENLARGED    


 


Activated Partial


Thromboplast Time 60.5 SEC 


  29.4 SEC 


  


  


 


 


Blood Urea Nitrogen 12 MG/DL    


 


Creatinine 0.61 MG/DL    


 


Random Glucose 108 MG/DL    


 


Total Protein 5.5 GM/DL    


 


Albumin 2.2 GM/DL    


 


Calcium Level 8.5 MG/DL    


 


Phosphorus Level 3.4 MG/DL    


 


Magnesium Level 2.0 MG/DL    


 


Alkaline Phosphatase 71 U/L    


 


Aspartate Amino Transf


(AST/SGOT) 14 U/L 


  


  


  


 


 


Alanine Aminotransferase


(ALT/SGPT) 9 U/L 


  


  


  


 


 


Total Bilirubin 0.4 MG/DL    


 


Sodium Level 137 MEQ/L    


 


Potassium Level 3.3 MEQ/L    


 


Chloride Level 101 MEQ/L    


 


Carbon Dioxide Level 29.4 MEQ/L    


 


Anion Gap 7 MEQ/L    


 


Estimat Glomerular Filtration


Rate 95 ML/MIN 


  


  


  


 








Imaging





Last Impressions








Head CT 12/18/17 0000 Signed





Impressions: 





 Service Date/Time:  Monday, December 18, 2017 05:41 - CONCLUSION:  1. 

Bilateral 





 suspected areas of encephalomalacia being more prominent on the right. 2. 





 Suspected small vessel ischemic change throughout the white matter. 3. 

Atrophy. 





 4. Acute area of hemorrhage or mass effect is not seen.     Emir Sorensen MD 


 


Chest X-Ray 12/18/17 0000 Signed





Impressions: 





 Service Date/Time:  Monday, December 18, 2017 02:34 - CONCLUSION:  Increased 





 density at the right base likely representing mild atelectasis or 

consolidation. 





     Emir Sorensen MD 


 


Lower Extremity Ultrasound 12/17/17 0000 Signed





Impressions: 





 Service Date/Time:  Sunday, December 17, 2017 20:48 - CONCLUSION: No DVT.     





 Emir Sorensen MD 


 


Aorta w/Runoff CTA 12/17/17 0000 Signed





Impressions: 





 Service Date/Time:  Sunday, December 17, 2017 17:47 - CONCLUSION:  1. 





 Atherosclerotic changes seen throughout the arterial system, including 





 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 

prominent 





 mural thrombus. 2. Atherosclerotic change in the external iliac and common 





 femoral arteries bilaterally as described above.  3. Occlusion of the 





 superficial femoral arteries bilaterally with reconstitution distally.  The 





 reconstitution is higher on the left side.  4. Normal trifurcation vessels 

seen 





 on the left side. 5. Diminished flow seen at the right trifurcation vessels.  





 The vessels can only be well seen on the delayed images.  The vessels can not 

be 





 traced into the foot.           Emir Sorensen MD 








Objective Remarks


GENERAL: Awake and alert very soft-spoken but somewhat confused appears stated 

age some confusion


SKIN: Cool and dry.  Decreased warmth in bilateral lower extremities


HEAD: Atraumatic. Normocephalic. 


EYES: Pupils equal and round. No scleral icterus. No injection or drainage. 


ENT: No nasal bleeding or discharge.  Mucous membranes pink and moist.  Tongue 

is midline


NECK: Trachea midline. No JVD.  Supple


CARDIOVASCULAR: Regular rate and rhythm.  S1-S2 no S3 or S4 no heave or thrill 

or rub or gallop


RESPIRATORY: No accessory muscle use. Clear to auscultation. Breath sounds 

equal bilaterally. 


GASTROINTESTINAL: Abdomen soft, non-tender, nondistended. Hepatic and splenic 

margins not palpable. 


MUSCULOSKELETAL: Extremities without clubbing or edema. No obvious deformities.

  Has some decreased peripheral pulses and some cyanosis in bilateral lower 

extremities


NEUROLOGICAL: Awake and alert. No obvious cranial nerve deficits.  Motor 

grossly within normal limits.  4 out of 5 muscle strength in the arms and legs.

  Normal speech.


PSYCHIATRIC: INAppropriate mood and affect; insight and judgment ABnormal.


Procedures











KASHIF THAPA











 **Signed**





 EXAM DATE/TIME:  12/17/2017 17:47 


 


[facility] COMPARISON:     


No previous studies available for comparison.


 


 


INDICATIONS :     


Right leg pain; evaluate for occlusion.


                      


 


IV CONTRAST:     


100 cc Omnipaque 350 (iohexol) IV 


 


 


RADIATION DOSE:     


2.3 CTDIvol (mGy) 


 


 


MEDICAL HISTORY :     


Cerebrovascular disease.  


 


SURGICAL HISTORY :      


Tubal ligation. 


 


ENCOUNTER:     


Initial


 


ACUITY:     


1 month


 


PAIN SCALE:     


7/10


 


LOCATION:      


Right lower leg


 


TECHNIQUE:     


Volumetric scanning was performed using a multi-row detector CT scanner.  The 

data was post processed with a variety of visualization algorithms including 

full volume maximum intensity projection, multi-planar sliding thin slab 

reformation, curved planar reformation, and surface rendering techniques.  

Using automated exposure control and adjustment of the mA and/or kV according 

to patient size, radiation dose was kept as low as reasonably achievable to 

obtain optimal diagnostic quality images.   DICOM format image data is 

available electronically for review and comparison.  


 


FINDINGS:     


There are atherosclerotic changes seen throughout the arterial system.  The 

abdominal aorta measures up to 2.9 cm.  At the distal infrarenal abdominal 

aorta thrombus occupies more than half of the lumen.  The functional lumen is 

seen at the left lateral aspect of the distal abdominal aorta.  There is 

atherosclerotic change at the common iliac arteries bilaterally.  A significant 

stenosis is not seen at this level is not seen.  There does appear to be severe 

stenosis and possible occlusion at the origin of the right internal iliac 

artery.  There is mild plaque seen at the right external iliac artery.  There 

is a severe stenosis at the distal left external iliac artery.


 


There is atherosclerotic change seen at the common femoral arteries bilaterally 

being more severe on the right.  There is a severe stenosis at the right common 

femoral artery narrowing the lumen by approximately 80%.  There is severe 

narrowing of the proximal right profunda femoris artery.  There is abrupt 

occlusion of the proximal right superficial femoral artery.  Flow in the thigh 

is continues through collaterals in the profunda femoris artery distribution.  

There is reconstitution of the distal popliteal artery at the level of the 

distal femur via collaterals.  There is a normal trifurcation.  The right 

anterior tibial artery can be traced to the ankle.  The posterior tibial and 

peritoneal arteries can be seen to the distal lower leg but can not clearly be 

traced into the foot.  


 


Again noted is the mild stenosis at the left common femoral artery.  There is 

occlusion of the proximal left superficial femoral artery.  The popliteal 

artery is patent throughout.  There is reconstitution of the distal superficial 

femoral artery at the level of the distal femoral shaft.  The popliteal artery 

is patent.  There is a severe stenosis at the mid popliteal artery.  The lumen 

is narrowed by over 75%.  The more distal popliteal artery is patent.  The 

anterior and posterior tibial artery could be traced to the foot.  The 

peritoneal artery can be traced to the ankle region.  


 


Atherosclerotic calcifications are seen throughout the origins of the vessels 

in the upper abdomen.  There does appear to be at least a moderate stenosis at 

the origin of the right renal artery.  The left renal artery appears patent.  

The celiac, SMA and DMITRI are patent.  


 


There is a mild hiatal hernia.  The liver, spleen, pancreas and kidneys appear 

grossly normal.  The adrenal glands are grossly normal.  The pelvic structures 

appear intact.  The patient does have a Patino catheter in the urinary bladder.  

There is degenerative change in the lumbar spine.  


 


CONCLUSION:


1. Atherosclerotic changes seen throughout the arterial system, including 

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 

mural thrombus.


2. Atherosclerotic change in the external iliac and common femoral arteries 

bilaterally as described above. 


3. Occlusion of the superficial femoral arteries bilaterally with 

reconstitution distally.  The reconstitution is higher on the left side. 


4. Normal trifurcation vessels seen on the left side.


5. Diminished flow seen at the right trifurcation vessels.  The vessels can 

only be well seen on the delayed images.  The vessels can not be traced into 

the foot.      


 


 


 


 Emir Sorensen MD on December 17, 2017 at 19:28                


Board Certified Radiologist.


 This report was verified electronically.


Medications and IVs





Current Medications


Sodium Chloride 1,000 ml @  1,000 mls/hr Q1H ONCE IV  Last administered on 12/17 /17at 13:04;  Start 12/17/17 at 11:57;  Stop 12/17/17 at 12:56;  Status DC


Sodium Chloride 800 ml @  1,000 mls/hr Q48M ONCE IV  Last administered on 12/17/ 17at 13:04;  Start 12/17/17 at 11:57;  Stop 12/17/17 at 12:44;  Status DC


Piperacillin Sod/ Tazobactam Sod 100 ml @  200 mls/hr ONCE  STAT IV  Last 

administered on 12/17/17at 13:11;  Start 12/17/17 at 12:52;  Stop 12/17/17 at 13

:21;  Status DC


Vancomycin HCl 1000 mg/Sodium Chloride 250 ml @  250 mls/hr ONCE  ONCE IV  Last 

administered on 12/17/17at 14:07;  Start 12/17/17 at 13:15;  Stop 12/17/17 at 14

:14;  Status DC


Ceftriaxone Sodium 1000 mg/ Sodium Chloride 100 ml @  200 mls/hr Q24H IV  Last 

administered on 12/19/17at 16:28;  Start 12/17/17 at 16:00


Sodium Chloride 1,000 ml @  100 mls/hr Q10H IV  Last administered on 12/17/17at 

16:00;  Start 12/17/17 at 16:00;  Stop 12/18/17 at 03:41;  Status DC


Sodium Chloride (NS Flush) 2 ml UNSCH  PRN IV FLUSH FLUSH AFTER USING IV ACCESS

;  Start 12/17/17 at 16:00


Sodium Chloride (NS Flush) 2 ml BID IV FLUSH  Last administered on 12/20/17at 08

:30;  Start 12/17/17 at 21:00


Acetaminophen (Tylenol) 650 mg Q4H  PRN PO TEMP > 100.4;  Start 12/17/17 at 16:

00


Ondansetron HCl (Zofran Inj) 4 mg Q6H  PRN IVP NAUSEA OR VOMITING;  Start 12/17/ 17 at 16:00


Temazepam (Restoril) 15 mg HS  PRN PO INSOMNIA;  Start 12/17/17 at 16:00


Enoxaparin Sodium (Lovenox Inj) 40 mg Q24H SQ  Last administered on 12/17/17at 

18:47;  Start 12/17/17 at 17:00;  Stop 12/18/17 at 12:27;  Status DC


Naloxone HCl (Narcan Inj) 0.4 mg UNSCH  PRN IV PUSH SEE LABEL COMMENTS;  Start 

12/17/17 at 16:00


Senna/Docusate Sodium (Tiarra-Colace) 1 tab BID PO  Last administered on 12/19/ 17at 09:03;  Start 12/17/17 at 21:00


Magnesium Hydroxide (Milk Of Magnesia Liq) 30 ml Q12H  PRN PO Mild constipation

;  Start 12/17/17 at 16:00


Sennosides (Senokot) 17.2 mg Q12H  PRN PO Moderate constipation;  Start 12/17/ 17 at 16:00


Bisacodyl (Dulcolax Supp) 10 mg DAILY  PRN RECTAL SEVERE CONSITIPATION;  Start 

12/17/17 at 16:00


Lactulose (Lactulose Liq) 30 ml DAILY  PRN PO SEVERE CONSITIPATION;  Start 12/17 /17 at 16:00


Iohexol (Omnipaque 350 Inj) 100 ml STK-MED ONCE IVCONTRAST  Last administered 

on 12/17/17at 18:02;  Start 12/17/17 at 18:02;  Stop 12/17/17 at 18:07;  Status 

DC


Clonidine (Catapres) 0.1 mg ONCE  ONCE PO  Last administered on 12/17/17at 22:05

;  Start 12/17/17 at 21:15;  Stop 12/17/17 at 21:26;  Status DC


Furosemide (Lasix Inj) 40 mg ONCE  ONCE IV PUSH  Last administered on 12/18/ 17at 04:03;  Start 12/18/17 at 03:45;  Stop 12/18/17 at 03:47;  Status DC


Albuterol/ Ipratropium (Duoneb Neb) 1 ampule Q4HR NEB  PRN NEB SOB/WHEEZING;  

Start 12/18/17 at 04:00


Heparin Sodium/ Dextrose 250 ml @  10 mls/hr TITRATE  PRN IV Coagulation 

Management Last administered on 12/19/17at 09:00;  Start 12/18/17 at 06:30


Clopidogrel Bisulfate (Plavix) 75 mg DAILY PO  Last administered on 12/19/17at 

09:03;  Start 12/18/17 at 12:00


Enoxaparin Sodium (Lovenox Inj) 40 mg Q24H SQ ;  Start 12/18/17 at 17:00;  Stop 

12/18/17 at 17:00;  Status DC


Lactated Ringer's 1,000 ml @  30 mls/hr Q24H PRN IV SEE LABEL COMMENTS;  Start 

12/20/17 at 12:15;  Stop 12/20/17 at 12:57;  Status DC


Sodium Chloride 500 ml @  30 mls/hr [id number] PRN IV SEE LABEL COMMENTS;  Start 12 /20/17 at 12:15;  Stop 12/20/17 at 14:23;  Status DC


Metoprolol Tartrate (Lopressor) 25 mg ON CALL  PRN PO SEE LABEL COMMENTS;  

Start 12/20/17 at 12:15;  Stop 12/23/17 at 12:14


Povidone Iodine (Betadine 5% Antisepsis Kit) 1 applic ON CALL  PRN EACH NARE 

SEE LABEL COMMENTS;  Start 12/20/17 at 12:15;  Stop 12/23/17 at 12:14


Chlorhexidine Gluconate (Chlorhexidine 2% Cloth) 3 pack ON CALL  PRN TOPICAL 

SEE LABEL COMMENTS;  Start 12/20/17 at 12:15;  Stop 12/23/17 at 12:14


Heparin Sodium (Porcine) (Heparin Inj) 10,000 units STK-MED ONCE .ROUTE ;  

Start 12/20/17 at 12:15;  Stop 12/20/17 at 12:16;  Status DC


Heparin Sodium (Porcine) (Heparin Inj) 10,000 units STK-MED ONCE .ROUTE ;  

Start 12/20/17 at 12:15;  Stop 12/20/17 at 12:16;  Status DC


Protamine Sulfate (Protamine Sulfate Inj) 50 mg STK-MED ONCE .ROUTE ;  Start 12/ 20/17 at 12:15;  Stop 12/20/17 at 12:16;  Status DC


Lactated Ringer's 1,000 ml @  80 mls/hr [id number] IV ;  Start 12/20/17 at 13:00


Urinary Catheter:  No


Vascular Central Line Catheter:  No





A/P


Assessment and Plan


Bilateral LE discoloration and unable to feel DP pulses. Patient with pain and 

cold extremities. Will do CTA Ao run off and will consult vascular surgery 

discussed with Dr. SANTOS WILL NEED SURGERY will need a femoropopliteal bypass





Started on antibiotic Rocephin IV . Received vanco and zosyn in the ED. 

Received bolus of NS in the eD. Continue IVF. Monitor VS closely. 


Urine cultures shows Escherichia coli UTI sensitive to Rocephin which she is on





Initial EKG shows sinus tachycardia with occasional PVCs.  Rate is 111, this 

was reviewed 





Chest x-ray shows no acute disease





Restart home meds as appropriate patient says she currently doesn't take any 

meds. 





Repeat labs cbc, CMP tomorrow 





Consult PT for eval CONSULT OT





Consult case management for DC plan 





DVT ppx lovenox





History of cerebrovascular accident with with sounds like probable chronic 

confusion





Gait instability possibly secondary to cerebrovascular accident versus 

peripheral vascular occlusive disease





HAD ABNORMAL EKG- CARDIOLOGY WAS CONSULTED- SURGERY PLACED ON HOLD


DW RN AND PT AND CASE MANAGEMENT





WILL NEED CARDIAC CLEARANCE BEFORE SURGERY





Deconditioning Will need SNF and PT and OT


Discharge Planning


WILL NEED SNF AT [place]











Froilan Marrufo DO Dec 20, 2017 14:48

## 2017-12-21 VITALS
SYSTOLIC BLOOD PRESSURE: 149 MMHG | DIASTOLIC BLOOD PRESSURE: 76 MMHG | RESPIRATION RATE: 15 BRPM | TEMPERATURE: 98.4 F | HEART RATE: 84 BPM | OXYGEN SATURATION: 90 %

## 2017-12-21 VITALS
HEART RATE: 82 BPM | DIASTOLIC BLOOD PRESSURE: 85 MMHG | RESPIRATION RATE: 18 BRPM | TEMPERATURE: 97.8 F | SYSTOLIC BLOOD PRESSURE: 152 MMHG | OXYGEN SATURATION: 96 %

## 2017-12-21 VITALS
OXYGEN SATURATION: 91 % | DIASTOLIC BLOOD PRESSURE: 67 MMHG | SYSTOLIC BLOOD PRESSURE: 159 MMHG | RESPIRATION RATE: 16 BRPM | TEMPERATURE: 98.5 F | HEART RATE: 87 BPM

## 2017-12-21 VITALS
OXYGEN SATURATION: 92 % | HEART RATE: 90 BPM | RESPIRATION RATE: 18 BRPM | SYSTOLIC BLOOD PRESSURE: 142 MMHG | DIASTOLIC BLOOD PRESSURE: 75 MMHG | TEMPERATURE: 97.3 F

## 2017-12-21 VITALS
RESPIRATION RATE: 16 BRPM | SYSTOLIC BLOOD PRESSURE: 152 MMHG | OXYGEN SATURATION: 95 % | DIASTOLIC BLOOD PRESSURE: 89 MMHG | HEART RATE: 84 BPM | TEMPERATURE: 97.6 F

## 2017-12-21 LAB
ALBUMIN SERPL-MCNC: 2.3 GM/DL (ref 3.4–5)
ALP SERPL-CCNC: 82 U/L (ref 45–117)
ALT SERPL-CCNC: 10 U/L (ref 10–53)
AST SERPL-CCNC: 16 U/L (ref 15–37)
BASOPHILS # BLD AUTO: 0.1 TH/MM3 (ref 0–0.2)
BASOPHILS NFR BLD AUTO: 2 % (ref 0–2)
BASOPHILS NFR BLD: 0.6 % (ref 0–2)
BILIRUB SERPL-MCNC: 0.4 MG/DL (ref 0.2–1)
BUN SERPL-MCNC: 6 MG/DL (ref 7–18)
CALCIUM SERPL-MCNC: 8.6 MG/DL (ref 8.5–10.1)
CHLORIDE SERPL-SCNC: 101 MEQ/L (ref 98–107)
CREAT SERPL-MCNC: 0.59 MG/DL (ref 0.5–1)
EOSINOPHIL # BLD: 0.5 TH/MM3 (ref 0–0.4)
EOSINOPHIL NFR BLD: 2.7 % (ref 0–4)
ERYTHROCYTE [DISTWIDTH] IN BLOOD BY AUTOMATED COUNT: 18.2 % (ref 11.6–17.2)
GFR SERPLBLD BASED ON 1.73 SQ M-ARVRAT: 99 ML/MIN (ref 89–?)
GLUCOSE SERPL-MCNC: 94 MG/DL (ref 74–106)
HCO3 BLD-SCNC: 26.4 MEQ/L (ref 21–32)
HCT VFR BLD CALC: 55 % (ref 35–46)
HGB BLD-MCNC: 17.6 GM/DL (ref 11.6–15.3)
LYMPHOCYTES # BLD AUTO: 1.1 TH/MM3 (ref 1–4.8)
LYMPHOCYTES NFR BLD AUTO: 5.7 % (ref 9–44)
LYMPHOCYTES: 1 % (ref 9–44)
MAGNESIUM SERPL-MCNC: 2 MG/DL (ref 1.5–2.5)
MCH RBC QN AUTO: 22.1 PG (ref 27–34)
MCHC RBC AUTO-ENTMCNC: 32 % (ref 32–36)
MCV RBC AUTO: 69.1 FL (ref 80–100)
MONOCYTE #: 0.7 TH/MM3 (ref 0–0.9)
MONOCYTES NFR BLD: 3.8 % (ref 0–8)
MONOCYTES: 2 % (ref 0–8)
NEUTROPHILS # BLD AUTO: 16.2 TH/MM3 (ref 1.8–7.7)
NEUTROPHILS NFR BLD AUTO: 87.2 % (ref 16–70)
NEUTS BAND # BLD MANUAL: 17.7 TH/MM3 (ref 1.8–7.7)
NEUTS BAND NFR BLD: 13 % (ref 0–6)
NEUTS SEG NFR BLD MANUAL: 82 % (ref 16–70)
OVALOCYTES BLD QL SMEAR: (no result)
PHOSPHATE SERPL-MCNC: 3.1 MG/DL (ref 2.5–4.9)
PLATELET # BLD: 788 TH/MM3 (ref 150–450)
PMV BLD AUTO: 9.2 FL (ref 7–11)
PROT SERPL-MCNC: 5.8 GM/DL (ref 6.4–8.2)
RBC # BLD AUTO: 7.96 MIL/MM3 (ref 4–5.3)
SODIUM SERPL-SCNC: 136 MEQ/L (ref 136–145)
WBC # BLD AUTO: 18.6 TH/MM3 (ref 4–11)

## 2017-12-21 RX ADMIN — Medication SCH ML: at 08:38

## 2017-12-21 RX ADMIN — STANDARDIZED SENNA CONCENTRATE AND DOCUSATE SODIUM SCH TAB: 8.6; 5 TABLET, FILM COATED ORAL at 08:38

## 2017-12-21 RX ADMIN — CLOPIDOGREL BISULFATE SCH MG: 75 TABLET, FILM COATED ORAL at 08:40

## 2017-12-21 RX ADMIN — HEPARIN SODIUM PRN MLS/HR: 10000 INJECTION, SOLUTION INTRAVENOUS at 10:33

## 2017-12-21 RX ADMIN — SODIUM CHLORIDE SCH MLS/HR: 900 INJECTION INTRAVENOUS at 16:03

## 2017-12-21 RX ADMIN — Medication SCH ML: at 21:46

## 2017-12-21 RX ADMIN — STANDARDIZED SENNA CONCENTRATE AND DOCUSATE SODIUM SCH TAB: 8.6; 5 TABLET, FILM COATED ORAL at 21:46

## 2017-12-21 NOTE — MB
cc:

GONZALO CORDOVA

****

 

 

DATE OF CONSULTATION

12/20/2017

 

HISTORY OF PRESENT ILLNESS

A 77-year-old white female with a history of hypertension and dyslipidemia, was

brought with generalized weakness, poor nutrition, unable to take care of

herself.  She was found to have severe peripheral vascular disease with near

occlusion of the both common femoral arteries, occlusion of the SFA.  She was

diagnosed with urosepsis.  Dr. Acosta was consulted for Vascular Surgery.

She complains of right lower extremity pain.  She denies any angina.  She was

seen by Anesthesia but was found to have abnormal EKG and cardiology

consultation was requested.  She denies any dyspnea or angina at this time.

 

PAST MEDICAL HISTORY

Positive for hypertension.

Dyslipidemia.

 

ALLERGIES

None.

 

PAST SURGICAL HISTORY

History of tubal ligation.

Tonsillectomy.

 

ALLERGIES

None.

 

SOCIAL HISTORY

The patient used to smoke in the past.  She does not drink alcohol.

 

FAMILY HISTORY

Negative.

 

REVIEW OF SYSTEMS

Otherwise negative.

 

 

 

 

 

PHYSICAL EXAMINATION

VITAL SIGNS:  Blood pressure 146/70, pulse 98 and regular.  HEENT:  Negative.

 

NECK:   2+ carotid upstrokes.  No bruits.

LUNGS:  Clear.

HEART:  Regular with systolic murmur, no gallop or rub.

ABDOMEN:  Soft.

EXTREMITIES:  Without edema.  Absent distal pulses.

NEUROLOGIC EXAM:  Grossly nonfocal.

 

EKG

Reviewed and showed sinus tachycardia, LVH and diffuse ST-T changes.

 

LABS

Hemoglobin 17.1, potassium 3.3, creatinine 0.6.

AST 14, ALT 9.

BNP 2380.

 

ECHOCARDIOGRAM

Reviewed and showed preserved left ventricular systolic function with an

ejection fraction of 55-60% with no WMA.

 

DIAGNOSES

1. Abnormal EKG.

2. Severe peripheral vascular disease.

3. Hypertension.

4. Dyslipidemia.

5. Sepsis.

6. UTI.

7. Poor nutrition.  



DISPOSITION

The patient was found to have severe peripheral vascular disease.

Vascular surgery considered in the near future.  EKG was found to be

abnormal by Anesthesia and further cardiac evaluation requested.  Her

echocardiogram showed preserved left ventricular function.

She will be scheduled for adenosine myocardial perfusion study tomorrow to

assess for ischemia.  I will follow her for Cardiology during her

hospitalization.

 

                              _________________________________

                              Gonzalo Cordova MD

[facility] ROSA/SS[facility]

D:  12/20/2017/6:16 PM

T:  12/21/2017/5:26 AM

Visit #:  [id number]      Job #:  [id number]

CATHERINE

## 2017-12-21 NOTE — PD.CARD.PN
Subjective


Subjective Remarks


No CP or SOB





Objective


Medications





Current Medications








 Medications


  (Trade)  Dose


 Ordered  Sig/Carlitos


 Route  Start Time


 Stop Time Status Last Admin


 


 Ceftriaxone


 Sodium 1000 mg/


 Sodium Chloride  100 ml @ 


 200 mls/hr  Q24H


 IV  12/17/17 16:00


    12/21/17 16:03


 


 


  (NS Flush)  2 ml  UNSCH  PRN


 IV FLUSH  12/17/17 16:00


     


 


 


  (NS Flush)  2 ml  BID


 IV FLUSH  12/17/17 21:00


    12/20/17 22:37


 


 


  (Tylenol)  650 mg  Q4H  PRN


 PO  12/17/17 16:00


     


 


 


  (Zofran Inj)  4 mg  Q6H  PRN


 IVP  12/17/17 16:00


     


 


 


  (Restoril)  15 mg  HS  PRN


 PO  12/17/17 16:00


     


 


 


  (Narcan Inj)  0.4 mg  UNSCH  PRN


 IV PUSH  12/17/17 16:00


     


 


 


  (Tiarra-Colace)  1 tab  BID


 PO  12/17/17 21:00


    12/20/17 22:36


 


 


  (Milk Of


 Magnesia Liq)  30 ml  Q12H  PRN


 PO  12/17/17 16:00


     


 


 


  (Senokot)  17.2 mg  Q12H  PRN


 PO  12/17/17 16:00


     


 


 


  (Dulcolax Supp)  10 mg  DAILY  PRN


 RECTAL  12/17/17 16:00


     


 


 


  (Lactulose Liq)  30 ml  DAILY  PRN


 PO  12/17/17 16:00


     


 


 


  (Duoneb Neb)  1 ampule  Q4HR NEB  PRN


 NEB  12/18/17 04:00


     


 


 


 Heparin Sodium/


 Dextrose  250 ml @ 


 10 mls/hr  TITRATE  PRN


 IV  12/18/17 06:30


    12/21/17 10:33


 


 


  (Plavix)  75 mg  DAILY


 PO  12/18/17 12:00


    12/21/17 08:40


 


 


  (Lopressor)  25 mg  ON CALL  PRN


 PO  12/20/17 12:15


 12/23/17 12:14   


 


 


  (Betadine 5%


 Antisepsis Kit)  1 applic  ON CALL  PRN


 EACH NARE  12/20/17 12:15


 12/23/17 12:14   


 


 


  (Chlorhexidine


 2% Cloth)  3 pack  ON CALL  PRN


 TOPICAL  12/20/17 12:15


 12/23/17 12:14   


 


 


 Lactated Ringer's  1,000 ml @ 


 80 mls/hr  [id number]


 IV  12/20/17 13:00


     


 








Vital Signs / I&O





Vital Signs








  Date Time  Temp Pulse Resp B/P (MAP) Pulse Ox O2 Delivery O2 Flow Rate FiO2


 


12/21/17 16:00 98.5 87 16 159/67 (97) 91   


 


12/21/17 08:00 98.4 84 15 149/76 (100) 90   


 


12/21/17 04:35 97.8 82 18 152/85 (107) 96   


 


12/21/17 01:13 97.6 84 16 152/89 (110) 95   


 


12/20/17 20:09 98.1 88 16 146/75 (98) 95   














I/O      


 


 12/20/17 12/20/17 12/20/17 12/21/17 12/21/17 12/21/17





 07:00 15:00 23:00 07:00 15:00 23:00


 


Intake Total 280 ml  620 ml   480 ml


 


Output Total 800 ml  600 ml   1375 ml


 


Balance -520 ml  20 ml   -895 ml


 


      


 


Intake Oral 280 ml  620 ml   480 ml


 


Output Urine Total 800 ml  600 ml   1375 ml


 


# Bowel Movements 0  2   1








Physical Exam


GENERAL: In NAD


SKIN: Warm and dry.


HEAD: Normocephalic.


EYES: No scleral icterus. No injection or drainage. 


NECK: Supple, trachea midline. No JVD or lymphadenopathy.


CARDIOVASCULAR: Regular rate and rhythm without murmurs, gallops, or rubs. 


RESPIRATORY: Breath sounds equal bilaterally. No accessory muscle use.


GASTROINTESTINAL: Abdomen soft, non-tender, nondistended. 


MUSCULOSKELETAL: No cyanosis, or edema.





Laboratory





Laboratory Tests








Test


  12/21/17


06:28 12/21/17


14:23


 


White Blood Count 18.6 TH/MM3  


 


Red Blood Count 7.96 MIL/MM3  


 


Hemoglobin 17.6 GM/DL  


 


Hematocrit 55.0 %  


 


Mean Corpuscular Volume 69.1 FL  


 


Mean Corpuscular Hemoglobin 22.1 PG  


 


Mean Corpuscular Hemoglobin


Concent 32.0 % 


  


 


 


Red Cell Distribution Width 18.2 %  


 


Platelet Count 788 TH/MM3  


 


Mean Platelet Volume 9.2 FL  


 


Neutrophils (%) (Auto) 87.2 %  


 


Lymphocytes (%) (Auto) 5.7 %  


 


Monocytes (%) (Auto) 3.8 %  


 


Eosinophils (%) (Auto) 2.7 %  


 


Basophils (%) (Auto) 0.6 %  


 


Neutrophils # (Auto) 16.2 TH/MM3  


 


Lymphocytes # (Auto) 1.1 TH/MM3  


 


Monocytes # (Auto) 0.7 TH/MM3  


 


Eosinophils # (Auto) 0.5 TH/MM3  


 


Basophils # (Auto) 0.1 TH/MM3  


 


CBC Comment AUTO DIFF  


 


Differential Total Cells


Counted 100 


  


 


 


Neutrophils % (Manual) 82 %  


 


Band Neutrophils % 13 %  


 


Lymphocytes % 1 %  


 


Monocytes % 2 %  


 


Basophils % 2 %  


 


Neutrophils # (Manual) 17.7 TH/MM3  


 


Differential Comment


  FINAL DIFF


MANUAL 


 


 


Platelet Estimate HIGH  


 


Platelet Morphology Comment NORMAL  


 


Ovalocytes 1+  


 


Activated Partial


Thromboplast Time 58.4 SEC 


  58.2 SEC 


 


 


Blood Urea Nitrogen 6 MG/DL  


 


Creatinine 0.59 MG/DL  


 


Random Glucose 94 MG/DL  


 


Total Protein 5.8 GM/DL  


 


Albumin 2.3 GM/DL  


 


Calcium Level 8.6 MG/DL  


 


Phosphorus Level 3.1 MG/DL  


 


Magnesium Level 2.0 MG/DL  


 


Alkaline Phosphatase 82 U/L  


 


Aspartate Amino Transf


(AST/SGOT) 16 U/L 


  


 


 


Alanine Aminotransferase


(ALT/SGPT) 10 U/L 


  


 


 


Total Bilirubin 0.4 MG/DL  


 


Sodium Level 136 MEQ/L  


 


Potassium Level 3.6 MEQ/L  


 


Chloride Level 101 MEQ/L  


 


Carbon Dioxide Level 26.4 MEQ/L  


 


Anion Gap 9 MEQ/L  


 


Estimat Glomerular Filtration


Rate 99 ML/MIN 


  


 








Imaging





Last 24 hours Impressions








Myocardial Perfusion Scan Nuc Med 12/21/17 0000 Signed





Impressions: 





 Service Date/Time:  Thursday, December 21, 2017 11:26 - CONCLUSION:  Suspected 





 infarcts of the right coronary and circumflex global decreased perfusion fixed 





 at rest. No obvious ischemia.  RISK CATEGORY:      Intermediate (1-3%% Annual 





 Mortality Rate)      Dejuan Asencio MD 











Assessment and Plan


Problem List:  


(1) Peripheral vascular disease


ICD Codes:  I73.9 - Peripheral vascular disease, unspecified


Status:  Acute


(2) CAD (coronary artery disease)


ICD Codes:  I25.10 - Atherosclerotic heart disease of native coronary artery 

without angina pectoris


(3) Cardiomyopathy


ICD Codes:  I42.9 - Cardiomyopathy, unspecified


Assessment and Plan


Nuc ST with lateral and inferior perfusion defects with at least some 

reversibility. Proceed with cath and PCI if necessary tomorrow. This was 

discussed with the patient, she wishes to proceed.











Gonzalo Gerard MD Dec 21, 2017 18:59

## 2017-12-21 NOTE — RADRPT
EXAM DATE/TIME:  12/21/2017 11:26 

 

[facility] COMPARISON:     

No previous studies available for comparison.

 

 

INDICATIONS :     

General weakness and failure to thrive for one week. Abnormal EKG. 

                           

 

DOSE:     

25.3 mCi Tc99m Myoview at stress.

                     8.5 mCi Tc99m Myoview at rest.

                     0.4 mg Lexiscan

                       

 

 

STRESS SYMPTOMS:      

Shortness of breath. 

                       

 

 

EJECTION FRACTION:       

38%

                       

 

MEDICAL HISTORY :     

Hypertension. Stroke  

 

SURGICAL HISTORY :      

Tonsillectomy. Tubal ligation.  

 

ENCOUNTER:     

Initial

 

ACUITY:     

1 day

 

PAIN SCALE:     

0/10

 

LOCATION:       

chest 

 

TECHNIQUE:     

The patient underwent pharmacologic stress with infusion of prescribed dose.  Continuous ECG tracing 
was monitored during stress.  Gated SPECT imaging was performed after stress and conventional SPECT i
maging was performed at rest.  The examination was performed on a SPECT/CT scanner, both attenuation 
and non-corrected datasets were reviewed.

 

FINDINGS:     

 

DISTRIBUTION:     

The maximum perfused segment at stress is in the <anterolateral> wall.

 

PERFUSION STUDY:     

There is marked decreased perfusion in the lateral wall and the posterior wall. No evidence of ischem
ia or redistribution.

 

GATED STUDY:     

Global hypokinesis with some mild dyskinesia of the apex.. 

 

CONCLUSION:     

Suspected infarcts of the right coronary and circumflex global decreased perfusion fixed at rest. No 
obvious ischemia.

 

RISK CATEGORY:     

Intermediate (1-3% Annual Mortality Rate)

 

 

 

 

 Dejuan Asencio MD on December 21, 2017 at 13:30           

Board Certified Radiologist.

 This report was verified electronically.

## 2017-12-21 NOTE — HHI.PR
Subjective


Remarks


Patient is 77-year-old female with PMH of HTN, HLD brought into the emergency 

department via EMS for evaluation of generalized weakness.  Patient is a poor 

historian. Per their report she has been laying on her couch for the last 7 

days unable to get up because she has been weak.  Apparently either patient or 

daughter called 911.  Patient states that she has not been cleaned in several 

days.  Patient states she has not been eating well.  EMS reported that living 

conditions were suboptimal at best.  Patient smelled of feces and urine on 

arrival. Further work up reveals UTI  with sepsis. Started on abb after 

cultures obtained. 


Patient also was noted with discolored LE bluish discoloration, cold LE , and 

no pulses detected by US. Ao run off ordered and also vascular surgeon 

consulted for further eval.





12-18 DW DR SANTOS WILL NEED SURGERY LATER THIS WEEK


RK RN AND PT AND CM 


NO CURRENT COMPLAINTS


HAS PATINO IN PLACE


AM LABS


WILL NEED SNF AT [place]





12-19 has been seen by palliative care, they are  not able to locate any family 

at this time yet


Patient will need surgery likely femoropopliteal bypass


Discussed with patient and RN


Complains of some pain in the right lower extremity


A.m. labs


Continue on Rocephin for UTI





12-20 WAS TO HAVE SURGERY BUT WAS NOT CLEARED BY ANESTHESIA HAD ABNORMAL EKG


SO SURGERY WAS PUT ON HOLD AND CARDIOLOGY WAS CONSULTED


[facility] RN AND PT AND CM





12-21 patient is to go for nuclear stress test today per cardiology


Depending on results we'll determine her clearance by cardiology


Surgery is obviously on hold at this time


Will need femoropopliteal bypass more than likely


No current complaints





Objective


Vitals





Vital Signs








  Date Time  Temp Pulse Resp B/P (MAP) Pulse Ox O2 Delivery O2 Flow Rate FiO2


 


12/21/17 08:00 98.4 84 15 149/76 (100) 90   


 


12/21/17 04:35 97.8 82 18 152/85 (107) 96   


 


12/21/17 01:13 97.6 84 16 152/89 (110) 95   


 


12/20/17 20:09 98.1 88 16 146/75 (98) 95   


 


12/20/17 16:00 98.1 98 16 146/70 (95) 92   














I/O      


 


 12/20/17 12/20/17 12/20/17 12/21/17 12/21/17 12/21/17





 06:59 14:59 22:59 06:59 14:59 22:59


 


Intake Total 280 ml  620 ml   


 


Output Total 800 ml  600 ml   


 


Balance -520 ml  20 ml   


 


      


 


Intake Oral 280 ml  620 ml   


 


Output Urine Total 800 ml  600 ml   


 


# Bowel Movements 0  2   








Result Diagram:  


12/21/17 0628 12/21/17 0628





Other Results





 Laboratory Tests








Test


  12/18/17


13:55 12/18/17


15:02 12/18/17


18:44 12/19/17


03:43


 


Activated Partial


Thromboplast Time 44.6 SEC 


  36.6 SEC 


  36.4 SEC 


  53.0 SEC 


 


 


White Blood Count    20.1 TH/MM3 


 


Red Blood Count    7.50 MIL/MM3 


 


Hemoglobin    17.3 GM/DL 


 


Hematocrit    51.3 % 


 


Mean Corpuscular Volume    68.4 FL 


 


Mean Corpuscular Hemoglobin    23.0 PG 


 


Mean Corpuscular Hemoglobin


Concent 


  


  


  33.6 % 


 


 


Red Cell Distribution Width    18.6 % 


 


Platelet Count    568 TH/MM3 


 


Mean Platelet Volume    8.8 FL 


 


Neutrophils (%) (Auto)    85.5 % 


 


Lymphocytes (%) (Auto)    6.3 % 


 


Monocytes (%) (Auto)    5.5 % 


 


Eosinophils (%) (Auto)    1.9 % 


 


Basophils (%) (Auto)    0.8 % 


 


Neutrophils # (Auto)    17.2 TH/MM3 


 


Lymphocytes # (Auto)    1.3 TH/MM3 


 


Monocytes # (Auto)    1.1 TH/MM3 


 


Eosinophils # (Auto)    0.4 TH/MM3 


 


Basophils # (Auto)    0.2 TH/MM3 


 


CBC Comment    AUTO DIFF 


 


Differential Total Cells


Counted 


  


  


  100 


 


 


Neutrophils % (Manual)    80 % 


 


Band Neutrophils %    9 % 


 


Lymphocytes %    6 % 


 


Monocytes %    4 % 


 


Neutrophils # (Manual)    18.1 TH/MM3 


 


Myelocytes    1 % 


 


Differential Comment


  


  


  


  FINAL DIFF


MANUAL


 


Atypical Lymphocytes     % 


 


Toxic Vacuolation    PRESENT 


 


Platelet Estimate    HIGH 


 


Platelet Morphology Comment    ENLARGED 


 


Crenated Cell    1+ 


 


Acanthocytes    OCC 


 


Blood Urea Nitrogen    8 MG/DL 


 


Creatinine    0.60 MG/DL 


 


Random Glucose    105 MG/DL 


 


Total Protein    5.7 GM/DL 


 


Albumin    2.3 GM/DL 


 


Calcium Level    8.6 MG/DL 


 


Phosphorus Level    2.8 MG/DL 


 


Magnesium Level    1.9 MG/DL 


 


Alkaline Phosphatase    79 U/L 


 


Aspartate Amino Transf


(AST/SGOT) 


  


  


  20 U/L 


 


 


Alanine Aminotransferase


(ALT/SGPT) 


  


  


  9 U/L 


 


 


Total Bilirubin    0.7 MG/DL 


 


Sodium Level    135 MEQ/L 


 


Potassium Level    3.3 MEQ/L 


 


Chloride Level    99 MEQ/L 


 


Carbon Dioxide Level    26.5 MEQ/L 


 


Anion Gap    10 MEQ/L 


 


Estimat Glomerular Filtration


Rate 


  


  


  97 ML/MIN 


 


 


Hemoglobin A1c    5.5 % 


 


Free Thyroxine    1.90 NG/DL 


 


Thyroid Stimulating Hormone


3rd Gen 


  


  


  1.180 uIU/ML 


 


 


Test


  12/19/17


11:05 12/19/17


16:54 12/20/17


01:44 12/20/17


10:52


 


Activated Partial


Thromboplast Time 38.6 SEC 


  37.8 SEC 


  60.5 SEC 


  29.4 SEC 


 


 


White Blood Count   17.4 TH/MM3  


 


Red Blood Count   7.78 MIL/MM3  


 


Hemoglobin   17.1 GM/DL  


 


Hematocrit   53.2 %  


 


Mean Corpuscular Volume   68.4 FL  


 


Mean Corpuscular Hemoglobin   21.9 PG  


 


Mean Corpuscular Hemoglobin


Concent 


  


  32.1 % 


  


 


 


Red Cell Distribution Width   18.2 %  


 


Platelet Count   627 TH/MM3  


 


Mean Platelet Volume   8.9 FL  


 


Neutrophils (%) (Auto)   82.3 %  


 


Lymphocytes (%) (Auto)   8.6 %  


 


Monocytes (%) (Auto)   5.3 %  


 


Eosinophils (%) (Auto)   3.0 %  


 


Basophils (%) (Auto)   0.8 %  


 


Neutrophils # (Auto)   14.3 TH/MM3  


 


Lymphocytes # (Auto)   1.5 TH/MM3  


 


Monocytes # (Auto)   0.9 TH/MM3  


 


Eosinophils # (Auto)   0.5 TH/MM3  


 


Basophils # (Auto)   0.1 TH/MM3  


 


CBC Comment   AUTO DIFF  


 


Differential Total Cells


Counted 


  


  100 


  


 


 


Neutrophils % (Manual)   70 %  


 


Band Neutrophils %   15 %  


 


Lymphocytes %   12 %  


 


Monocytes %   1 %  


 


Basophils %   1 %  


 


Neutrophils # (Manual)   15.0 TH/MM3  


 


Metamyelocytes   1 %  


 


Differential Comment


  


  


  FINAL DIFF


MANUAL 


 


 


Platelet Estimate   HIGH  


 


Platelet Morphology Comment   ENLARGED  


 


Blood Urea Nitrogen   12 MG/DL  


 


Creatinine   0.61 MG/DL  


 


Random Glucose   108 MG/DL  


 


Total Protein   5.5 GM/DL  


 


Albumin   2.2 GM/DL  


 


Calcium Level   8.5 MG/DL  


 


Phosphorus Level   3.4 MG/DL  


 


Magnesium Level   2.0 MG/DL  


 


Alkaline Phosphatase   71 U/L  


 


Aspartate Amino Transf


(AST/SGOT) 


  


  14 U/L 


  


 


 


Alanine Aminotransferase


(ALT/SGPT) 


  


  9 U/L 


  


 


 


Total Bilirubin   0.4 MG/DL  


 


Sodium Level   137 MEQ/L  


 


Potassium Level   3.3 MEQ/L  


 


Chloride Level   101 MEQ/L  


 


Carbon Dioxide Level   29.4 MEQ/L  


 


Anion Gap   7 MEQ/L  


 


Estimat Glomerular Filtration


Rate 


  


  95 ML/MIN 


  


 


 


Test


  12/20/17


16:20 12/21/17


06:28 


  


 


 


Activated Partial


Thromboplast Time 33.8 SEC 


  58.4 SEC 


  


  


 


 


White Blood Count  18.6 TH/MM3   


 


Red Blood Count  7.96 MIL/MM3   


 


Hemoglobin  17.6 GM/DL   


 


Hematocrit  55.0 %   


 


Mean Corpuscular Volume  69.1 FL   


 


Mean Corpuscular Hemoglobin  22.1 PG   


 


Mean Corpuscular Hemoglobin


Concent 


  32.0 % 


  


  


 


 


Red Cell Distribution Width  18.2 %   


 


Platelet Count  788 TH/MM3   


 


Mean Platelet Volume  9.2 FL   


 


Neutrophils (%) (Auto)  87.2 %   


 


Lymphocytes (%) (Auto)  5.7 %   


 


Monocytes (%) (Auto)  3.8 %   


 


Eosinophils (%) (Auto)  2.7 %   


 


Basophils (%) (Auto)  0.6 %   


 


Neutrophils # (Auto)  16.2 TH/MM3   


 


Lymphocytes # (Auto)  1.1 TH/MM3   


 


Monocytes # (Auto)  0.7 TH/MM3   


 


Eosinophils # (Auto)  0.5 TH/MM3   


 


Basophils # (Auto)  0.1 TH/MM3   


 


CBC Comment  AUTO DIFF   


 


Differential Total Cells


Counted 


  100 


  


  


 


 


Neutrophils % (Manual)  82 %   


 


Band Neutrophils %  13 %   


 


Lymphocytes %  1 %   


 


Monocytes %  2 %   


 


Basophils %  2 %   


 


Neutrophils # (Manual)  17.7 TH/MM3   


 


Differential Comment


  


  FINAL DIFF


MANUAL 


  


 


 


Platelet Estimate  HIGH   


 


Platelet Morphology Comment  NORMAL   


 


Ovalocytes  1+   


 


Blood Urea Nitrogen  6 MG/DL   


 


Creatinine  0.59 MG/DL   


 


Random Glucose  94 MG/DL   


 


Total Protein  5.8 GM/DL   


 


Albumin  2.3 GM/DL   


 


Calcium Level  8.6 MG/DL   


 


Phosphorus Level  3.1 MG/DL   


 


Magnesium Level  2.0 MG/DL   


 


Alkaline Phosphatase  82 U/L   


 


Aspartate Amino Transf


(AST/SGOT) 


  16 U/L 


  


  


 


 


Alanine Aminotransferase


(ALT/SGPT) 


  10 U/L 


  


  


 


 


Total Bilirubin  0.4 MG/DL   


 


Sodium Level  136 MEQ/L   


 


Potassium Level  3.6 MEQ/L   


 


Chloride Level  101 MEQ/L   


 


Carbon Dioxide Level  26.4 MEQ/L   


 


Anion Gap  9 MEQ/L   


 


Estimat Glomerular Filtration


Rate 


  99 ML/MIN 


  


  


 








Imaging





Last Impressions








Head CT 12/18/17 0000 Signed





Impressions: 





 Service Date/Time:  Monday, December 18, 2017 05:41 - CONCLUSION:  1. 

Bilateral 





 suspected areas of encephalomalacia being more prominent on the right. 2. 





 Suspected small vessel ischemic change throughout the white matter. 3. 

Atrophy. 





 4. Acute area of hemorrhage or mass effect is not seen.     Emir Sorensen MD 


 


Chest X-Ray 12/18/17 0000 Signed





Impressions: 





 Service Date/Time:  Monday, December 18, 2017 02:34 - CONCLUSION:  Increased 





 density at the right base likely representing mild atelectasis or 

consolidation. 





     Emir Sorensen MD 


 


Lower Extremity Ultrasound 12/17/17 0000 Signed





Impressions: 





 Service Date/Time:  Sunday, December 17, 2017 20:48 - CONCLUSION: No DVT.     





 Emir Sorensen MD 


 


Aorta w/Runoff CTA 12/17/17 0000 Signed





Impressions: 





 Service Date/Time:  Sunday, December 17, 2017 17:47 - CONCLUSION:  1. 





 Atherosclerotic changes seen throughout the arterial system, including 





 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 

prominent 





 mural thrombus. 2. Atherosclerotic change in the external iliac and common 





 femoral arteries bilaterally as described above.  3. Occlusion of the 





 superficial femoral arteries bilaterally with reconstitution distally.  The 





 reconstitution is higher on the left side.  4. Normal trifurcation vessels 

seen 





 on the left side. 5. Diminished flow seen at the right trifurcation vessels.  





 The vessels can only be well seen on the delayed images.  The vessels can not 

be 





 traced into the foot.           Emir Sorensen MD 








Objective Remarks


GENERAL: Awake and alert very soft-spoken but somewhat confused appears stated 

age some confusion


SKIN: Cool and dry.  Decreased warmth in bilateral lower extremities


HEAD: Atraumatic. Normocephalic. 


EYES: Pupils equal and round. No scleral icterus. No injection or drainage. 


ENT: No nasal bleeding or discharge.  Mucous membranes pink and moist.  Tongue 

is midline


NECK: Trachea midline. No JVD.  Supple


CARDIOVASCULAR: Regular rate and rhythm.  S1-S2 no S3 or S4 no heave or thrill 

or rub or gallop


RESPIRATORY: No accessory muscle use. Clear to auscultation. Breath sounds 

equal bilaterally. 


GASTROINTESTINAL: Abdomen soft, non-tender, nondistended. Hepatic and splenic 

margins not palpable. 


MUSCULOSKELETAL: Extremities without clubbing or edema. No obvious deformities.

  Has some decreased peripheral pulses and some cyanosis in bilateral lower 

extremities


NEUROLOGICAL: Awake and alert. No obvious cranial nerve deficits.  Motor 

grossly within normal limits.  4 out of 5 muscle strength in the arms and legs.

  Normal speech.


PSYCHIATRIC: INAppropriate mood and affect; insight and judgment ABnormal.


Procedures











KAREL THAPAE











 **Signed**





 EXAM DATE/TIME:  12/17/2017 17:47 


 


[facility] COMPARISON:     


No previous studies available for comparison.


 


 


INDICATIONS :     


Right leg pain; evaluate for occlusion.


                      


 


IV CONTRAST:     


100 cc Omnipaque 350 (iohexol) IV 


 


 


RADIATION DOSE:     


2.3 CTDIvol (mGy) 


 


 


MEDICAL HISTORY :     


Cerebrovascular disease.  


 


SURGICAL HISTORY :      


Tubal ligation. 


 


ENCOUNTER:     


Initial


 


ACUITY:     


1 month


 


PAIN SCALE:     


7/10


 


LOCATION:      


Right lower leg


 


TECHNIQUE:     


Volumetric scanning was performed using a multi-row detector CT scanner.  The 

data was post processed with a variety of visualization algorithms including 

full volume maximum intensity projection, multi-planar sliding thin slab 

reformation, curved planar reformation, and surface rendering techniques.  

Using automated exposure control and adjustment of the mA and/or kV according 

to patient size, radiation dose was kept as low as reasonably achievable to 

obtain optimal diagnostic quality images.   DICOM format image data is 

available electronically for review and comparison.  


 


FINDINGS:     


There are atherosclerotic changes seen throughout the arterial system.  The 

abdominal aorta measures up to 2.9 cm.  At the distal infrarenal abdominal 

aorta thrombus occupies more than half of the lumen.  The functional lumen is 

seen at the left lateral aspect of the distal abdominal aorta.  There is 

atherosclerotic change at the common iliac arteries bilaterally.  A significant 

stenosis is not seen at this level is not seen.  There does appear to be severe 

stenosis and possible occlusion at the origin of the right internal iliac 

artery.  There is mild plaque seen at the right external iliac artery.  There 

is a severe stenosis at the distal left external iliac artery.


 


There is atherosclerotic change seen at the common femoral arteries bilaterally 

being more severe on the right.  There is a severe stenosis at the right common 

femoral artery narrowing the lumen by approximately 80%.  There is severe 

narrowing of the proximal right profunda femoris artery.  There is abrupt 

occlusion of the proximal right superficial femoral artery.  Flow in the thigh 

is continues through collaterals in the profunda femoris artery distribution.  

There is reconstitution of the distal popliteal artery at the level of the 

distal femur via collaterals.  There is a normal trifurcation.  The right 

anterior tibial artery can be traced to the ankle.  The posterior tibial and 

peritoneal arteries can be seen to the distal lower leg but can not clearly be 

traced into the foot.  


 


Again noted is the mild stenosis at the left common femoral artery.  There is 

occlusion of the proximal left superficial femoral artery.  The popliteal 

artery is patent throughout.  There is reconstitution of the distal superficial 

femoral artery at the level of the distal femoral shaft.  The popliteal artery 

is patent.  There is a severe stenosis at the mid popliteal artery.  The lumen 

is narrowed by over 75%.  The more distal popliteal artery is patent.  The 

anterior and posterior tibial artery could be traced to the foot.  The 

peritoneal artery can be traced to the ankle region.  


 


Atherosclerotic calcifications are seen throughout the origins of the vessels 

in the upper abdomen.  There does appear to be at least a moderate stenosis at 

the origin of the right renal artery.  The left renal artery appears patent.  

The celiac, SMA and DMITRI are patent.  


 


There is a mild hiatal hernia.  The liver, spleen, pancreas and kidneys appear 

grossly normal.  The adrenal glands are grossly normal.  The pelvic structures 

appear intact.  The patient does have a Patino catheter in the urinary bladder.  

There is degenerative change in the lumbar spine.  


 


CONCLUSION:


1. Atherosclerotic changes seen throughout the arterial system, including 

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 

mural thrombus.


2. Atherosclerotic change in the external iliac and common femoral arteries 

bilaterally as described above. 


3. Occlusion of the superficial femoral arteries bilaterally with 

reconstitution distally.  The reconstitution is higher on the left side. 


4. Normal trifurcation vessels seen on the left side.


5. Diminished flow seen at the right trifurcation vessels.  The vessels can 

only be well seen on the delayed images.  The vessels can not be traced into 

the foot.      


 


 


 


 Emir Sorensen MD on December 17, 2017 at 19:28                


Board Certified Radiologist.


 This report was verified electronically.


Medications and IVs





Current Medications


Sodium Chloride 1,000 ml @  1,000 mls/hr Q1H ONCE IV  Last administered on 12/17 /17at 13:04;  Start 12/17/17 at 11:57;  Stop 12/17/17 at 12:56;  Status DC


Sodium Chloride 800 ml @  1,000 mls/hr Q48M ONCE IV  Last administered on 12/17/ 17at 13:04;  Start 12/17/17 at 11:57;  Stop 12/17/17 at 12:44;  Status DC


Piperacillin Sod/ Tazobactam Sod 100 ml @  200 mls/hr ONCE  STAT IV  Last 

administered on 12/17/17at 13:11;  Start 12/17/17 at 12:52;  Stop 12/17/17 at 13

:21;  Status DC


Vancomycin HCl 1000 mg/Sodium Chloride 250 ml @  250 mls/hr ONCE  ONCE IV  Last 

administered on 12/17/17at 14:07;  Start 12/17/17 at 13:15;  Stop 12/17/17 at 14

:14;  Status DC


Ceftriaxone Sodium 1000 mg/ Sodium Chloride 100 ml @  200 mls/hr Q24H IV  Last 

administered on 12/20/17at 15:40;  Start 12/17/17 at 16:00


Sodium Chloride 1,000 ml @  100 mls/hr Q10H IV  Last administered on 12/17/17at 

16:00;  Start 12/17/17 at 16:00;  Stop 12/18/17 at 03:41;  Status DC


Sodium Chloride (NS Flush) 2 ml UNSCH  PRN IV FLUSH FLUSH AFTER USING IV ACCESS

;  Start 12/17/17 at 16:00


Sodium Chloride (NS Flush) 2 ml BID IV FLUSH  Last administered on 12/20/17at 22

:37;  Start 12/17/17 at 21:00


Acetaminophen (Tylenol) 650 mg Q4H  PRN PO TEMP > 100.4;  Start 12/17/17 at 16:

00


Ondansetron HCl (Zofran Inj) 4 mg Q6H  PRN IVP NAUSEA OR VOMITING;  Start 12/17/ 17 at 16:00


Temazepam (Restoril) 15 mg HS  PRN PO INSOMNIA;  Start 12/17/17 at 16:00


Enoxaparin Sodium (Lovenox Inj) 40 mg Q24H SQ  Last administered on 12/17/17at 

18:47;  Start 12/17/17 at 17:00;  Stop 12/18/17 at 12:27;  Status DC


Naloxone HCl (Narcan Inj) 0.4 mg UNSCH  PRN IV PUSH SEE LABEL COMMENTS;  Start 

12/17/17 at 16:00


Senna/Docusate Sodium (Tiarra-Colace) 1 tab BID PO  Last administered on 12/20/ 17at 22:36;  Start 12/17/17 at 21:00


Magnesium Hydroxide (Milk Of Magnesia Liq) 30 ml Q12H  PRN PO Mild constipation

;  Start 12/17/17 at 16:00


Sennosides (Senokot) 17.2 mg Q12H  PRN PO Moderate constipation;  Start 12/17/ 17 at 16:00


Bisacodyl (Dulcolax Supp) 10 mg DAILY  PRN RECTAL SEVERE CONSITIPATION;  Start 

12/17/17 at 16:00


Lactulose (Lactulose Liq) 30 ml DAILY  PRN PO SEVERE CONSITIPATION;  Start 12/17 /17 at 16:00


Iohexol (Omnipaque 350 Inj) 100 ml STK-MED ONCE IVCONTRAST  Last administered 

on 12/17/17at 18:02;  Start 12/17/17 at 18:02;  Stop 12/17/17 at 18:07;  Status 

DC


Clonidine (Catapres) 0.1 mg ONCE  ONCE PO  Last administered on 12/17/17at 22:05

;  Start 12/17/17 at 21:15;  Stop 12/17/17 at 21:26;  Status DC


Furosemide (Lasix Inj) 40 mg ONCE  ONCE IV PUSH  Last administered on 12/18/ 17at 04:03;  Start 12/18/17 at 03:45;  Stop 12/18/17 at 03:47;  Status DC


Albuterol/ Ipratropium (Duoneb Neb) 1 ampule Q4HR NEB  PRN NEB SOB/WHEEZING;  

Start 12/18/17 at 04:00


Heparin Sodium/ Dextrose 250 ml @  10 mls/hr TITRATE  PRN IV Coagulation 

Management Last administered on 12/21/17at 10:33;  Start 12/18/17 at 06:30


Clopidogrel Bisulfate (Plavix) 75 mg DAILY PO  Last administered on 12/21/17at 

08:40;  Start 12/18/17 at 12:00


Enoxaparin Sodium (Lovenox Inj) 40 mg Q24H SQ ;  Start 12/18/17 at 17:00;  Stop 

12/18/17 at 17:00;  Status DC


Lactated Ringer's 1,000 ml @  30 mls/hr Q24H PRN IV SEE LABEL COMMENTS;  Start 

12/20/17 at 12:15;  Stop 12/20/17 at 12:57;  Status DC


Sodium Chloride 500 ml @  30 mls/hr [id number] PRN IV SEE LABEL COMMENTS;  Start 12 /20/17 at 12:15;  Stop 12/20/17 at 14:23;  Status DC


Metoprolol Tartrate (Lopressor) 25 mg ON CALL  PRN PO SEE LABEL COMMENTS;  

Start 12/20/17 at 12:15;  Stop 12/23/17 at 12:14


Povidone Iodine (Betadine 5% Antisepsis Kit) 1 applic ON CALL  PRN EACH NARE 

SEE LABEL COMMENTS;  Start 12/20/17 at 12:15;  Stop 12/23/17 at 12:14


Chlorhexidine Gluconate (Chlorhexidine 2% Cloth) 3 pack ON CALL  PRN TOPICAL 

SEE LABEL COMMENTS;  Start 12/20/17 at 12:15;  Stop 12/23/17 at 12:14


Heparin Sodium (Porcine) (Heparin Inj) 10,000 units STK-MED ONCE .ROUTE ;  

Start 12/20/17 at 12:15;  Stop 12/20/17 at 12:16;  Status DC


Heparin Sodium (Porcine) (Heparin Inj) 10,000 units STK-MED ONCE .ROUTE ;  

Start 12/20/17 at 12:15;  Stop 12/20/17 at 12:16;  Status DC


Protamine Sulfate (Protamine Sulfate Inj) 50 mg STK-MED ONCE .ROUTE ;  Start 12/ 20/17 at 12:15;  Stop 12/20/17 at 12:16;  Status DC


Lactated Ringer's 1,000 ml @  80 mls/hr [id number] IV ;  Start 12/20/17 at 13:00


Potassium Chloride (KCl) 40 meq ONCE  ONCE PO  Last administered on 12/20/17at 

15:39;  Start 12/20/17 at 15:00;  Stop 12/20/17 at 15:01;  Status DC


Regadenoson (Lexiscan Inj) 0.4 mg STK-MED ONCE .ROUTE  Last administered on 12/ 21/17at 11:34;  Start 12/21/17 at 11:34;  Stop 12/21/17 at 11:35;  Status DC





A/P


Assessment and Plan


Bilateral LE discoloration and unable to feel DP pulses. Patient with pain and 

cold extremities. Will do CTA Ao run off and will consult vascular surgery 

discussed with Dr. SANTOS WILL NEED SURGERY will need a femoropopliteal bypass





Started on antibiotic Rocephin IV . Received vanco and zosyn in the ED. 

Received bolus of NS in the eD. Continue IVF. Monitor VS closely. 


Urine cultures shows Escherichia coli UTI sensitive to Rocephin which she is on





Initial EKG shows sinus tachycardia with occasional PVCs.  Rate is 111, this 

was reviewed 





Chest x-ray shows no acute disease





Restart home meds as appropriate patient says she currently doesn't take any 

meds. 





Repeat labs cbc, CMP tomorrow 





Consult PT for eval CONSULT OT





Consult case management for DC plan 





DVT ppx lovenox





History of cerebrovascular accident with with sounds like probable chronic 

confusion





Gait instability possibly secondary to cerebrovascular accident versus 

peripheral vascular occlusive disease





HAD ABNORMAL EKG- CARDIOLOGY WAS CONSULTED- SURGERY PLACED ON HOLD


DW RN AND PT AND CASE MANAGEMENT





WILL NEED CARDIAC CLEARANCE BEFORE SURGERY--adding stress test today DECEMBER 21st





Deconditioning Will need SNF and PT and OT


Discharge Planning


WILL NEED SNF AT DC











Froilan Marrufo DO Dec 21, 2017 12:17

## 2017-12-21 NOTE — HHI.HCPN
Briefly spoke with  regarding search for family. Number attempted 

earlier in week for daughter (Mariam Jameson) #[phone]. No return call 

and no contact made with multiple attempts. Psych consulted by attending MD to 

evaluate for Ms. Leonard's capacity to make her own medical decisions. 

Palliative care awaiting accurint results. 





Palliative care will continue to follow throughout hospitalization and address 

goals of care when medical decision maker is identified.











Nusrat Dixon, LCSW Dec 21, 2017 17:33

## 2017-12-21 NOTE — PD.CAR.PN
CVT Progress Note


Subjective/Hospital Course:


Referral received


Full consult to follow


Severe for peripheral vascular disease will require reconstruction


Valentin PEREZ


12/19/17


As noted in my consultation this patient has bilateral severe peripheral 

vascular disease


On top of that she is being treated for urosepsis and general decline


Patient has not been in the hospital for 3 days and has gradually improved for 

her urosepsis based on clinical exam level of alertness and diagnostic studies


Despite heparin drip the right leg is looking worse and worse and patient now 

has ischemia that needs attention in the resolution


In the best of worlds I would like to wait another while to do the surgery 

considering patient's other issues but that this point if surgery is not 

performed patient is a very high risk of losing her leg


I have reviewed patient's echocardiogram and studies and she is at this point 

moderate risk for vascular surgery but I believe options a limited and we have 

to go ahead with it.


Therefore patient will be scheduled for common femoral endarterectomy and 

femoropopliteal bypass tomorrow


I explained the risks and benefits of the procedure and all patient is very 

quiet she is clearly awake and alert and understands the discussion


Have tried to reach her daughter however number is apparently disconnected 


12/2017


Patient originally scheduled for surgery today however EKG reveals some 

anterior ischemia in addition to inferior ischemia that was noted on previous 

EKG


In discussion with the anesthesiologist the decision is made to postpone the 

surgery until patient can be worked up cardiac wise in more detail so we get a 

better picture on cardiac risk and possible remedy


Right leg and foot are ischemic and the cyanotic however while this surgery is 

urgent it is not an emergent and cardiac issues trump any other issue right now


We'll consult cardiology for full evaluation possible stress test


Restart heparin


All things equal once cardiac workup completed we'll proceed with vascular 

bypass and reconstruction on the right leg


12/21/17


Right leg and foot remain ischemic and cyanotic


Discussed with Dr. Pastor


This patient will lose her leg if for no surgery is done in next few days.  On 

the other hand patient has significant coronary artery disease and major other 

comorbidities and hence the high risk


Dr. Pastor we'll proceed with cardiac catheterization through the left leg and 

based on that will decide which way to go


There is of course a good chance the patient will be candidate for any surgery 

and that his then the rendering but we should make every effort to make patient 

at least be able to sustain a limb saving operation.


On the other hand limb saving operation has not much value if patient loses her 

life in the process and therefore every effort should be made to get patient in 

shape good enough to tolerate surgery


Objective:





Vital Signs








  Date Time  Temp Pulse Resp B/P (MAP) Pulse Ox O2 Delivery O2 Flow Rate FiO2


 


12/21/17 16:00 98.5 87 16 159/67 (97) 91   


 


12/21/17 08:00 98.4 84 15 149/76 (100) 90   


 


12/21/17 04:35 97.8 82 18 152/85 (107) 96   


 


12/21/17 01:13 97.6 84 16 152/89 (110) 95   


 


12/20/17 20:09 98.1 88 16 146/75 (98) 95   








Labs:





Laboratory Tests








Test


  12/21/17


06:28 12/21/17


14:23


 


White Blood Count


  18.6 TH/MM3


(4.0-11.0) 


 


 


Red Blood Count


  7.96 MIL/MM3


(4.00-5.30) 


 


 


Hemoglobin


  17.6 GM/DL


(11.6-15.3) 


 


 


Hematocrit


  55.0 %


(35.0-46.0) 


 


 


Mean Corpuscular Volume


  69.1 FL


(80.0-100.0) 


 


 


Mean Corpuscular Hemoglobin


  22.1 PG


(27.0-34.0) 


 


 


Mean Corpuscular Hemoglobin


Concent 32.0 %


(32.0-36.0) 


 


 


Red Cell Distribution Width


  18.2 %


(11.6-17.2) 


 


 


Platelet Count


  788 TH/MM3


(150-450) 


 


 


Mean Platelet Volume


  9.2 FL


(7.0-11.0) 


 


 


Neutrophils (%) (Auto)


  87.2 %


(16.0-70.0) 


 


 


Lymphocytes (%) (Auto)


  5.7 %


(9.0-44.0) 


 


 


Monocytes (%) (Auto)


  3.8 %


(0.0-8.0) 


 


 


Eosinophils (%) (Auto)


  2.7 %


(0.0-4.0) 


 


 


Basophils (%) (Auto)


  0.6 %


(0.0-2.0) 


 


 


Neutrophils # (Auto)


  16.2 TH/MM3


(1.8-7.7) 


 


 


Lymphocytes # (Auto)


  1.1 TH/MM3


(1.0-4.8) 


 


 


Monocytes # (Auto)


  0.7 TH/MM3


(0-0.9) 


 


 


Eosinophils # (Auto)


  0.5 TH/MM3


(0-0.4) 


 


 


Basophils # (Auto)


  0.1 TH/MM3


(0-0.2) 


 


 


CBC Comment AUTO DIFF  


 


Differential Total Cells


Counted 100 


  


 


 


Neutrophils % (Manual) 82 % (16-70)  


 


Band Neutrophils % 13 % (0-6)  


 


Lymphocytes % 1 % (9-44)  


 


Monocytes % 2 % (0-8)  


 


Basophils % 2 % (0-2)  


 


Neutrophils # (Manual)


  17.7 TH/MM3


(1.8-7.7) 


 


 


Differential Comment


  FINAL DIFF


MANUAL 


 


 


Platelet Estimate HIGH (NORMAL)  


 


Platelet Morphology Comment


  NORMAL


(NORMAL) 


 


 


Ovalocytes 1+ (NORMAL)  


 


Activated Partial


Thromboplast Time 58.4 SEC


(24.3-30.1) 58.2 SEC


(24.3-30.1)


 


Blood Urea Nitrogen 6 MG/DL (7-18)  


 


Creatinine


  0.59 MG/DL


(0.50-1.00) 


 


 


Random Glucose


  94 MG/DL


() 


 


 


Total Protein


  5.8 GM/DL


(6.4-8.2) 


 


 


Albumin


  2.3 GM/DL


(3.4-5.0) 


 


 


Calcium Level


  8.6 MG/DL


(8.5-10.1) 


 


 


Phosphorus Level


  3.1 MG/DL


(2.5-4.9) 


 


 


Magnesium Level


  2.0 MG/DL


(1.5-2.5) 


 


 


Alkaline Phosphatase


  82 U/L


() 


 


 


Aspartate Amino Transf


(AST/SGOT) 16 U/L (15-37) 


  


 


 


Alanine Aminotransferase


(ALT/SGPT) 10 U/L (10-53) 


  


 


 


Total Bilirubin


  0.4 MG/DL


(0.2-1.0) 


 


 


Sodium Level


  136 MEQ/L


(136-145) 


 


 


Potassium Level


  3.6 MEQ/L


(3.5-5.1) 


 


 


Chloride Level


  101 MEQ/L


() 


 


 


Carbon Dioxide Level


  26.4 MEQ/L


(21.0-32.0) 


 


 


Anion Gap 9 MEQ/L (5-15)  


 


Estimat Glomerular Filtration


Rate 99 ML/MIN


(>89) 


 








Result Diagram:  


12/21/17 0628                                                                  

              12/21/17 0628














Amber Acosta MD Dec 21, 2017 17:14

## 2017-12-22 VITALS
HEART RATE: 84 BPM | DIASTOLIC BLOOD PRESSURE: 74 MMHG | OXYGEN SATURATION: 94 % | RESPIRATION RATE: 18 BRPM | SYSTOLIC BLOOD PRESSURE: 140 MMHG | TEMPERATURE: 98.9 F

## 2017-12-22 VITALS
OXYGEN SATURATION: 92 % | SYSTOLIC BLOOD PRESSURE: 138 MMHG | RESPIRATION RATE: 18 BRPM | TEMPERATURE: 96.2 F | DIASTOLIC BLOOD PRESSURE: 77 MMHG | HEART RATE: 85 BPM

## 2017-12-22 VITALS
SYSTOLIC BLOOD PRESSURE: 154 MMHG | DIASTOLIC BLOOD PRESSURE: 72 MMHG | HEART RATE: 98 BPM | TEMPERATURE: 97.3 F | OXYGEN SATURATION: 93 % | RESPIRATION RATE: 15 BRPM

## 2017-12-22 VITALS
RESPIRATION RATE: 20 BRPM | TEMPERATURE: 98 F | DIASTOLIC BLOOD PRESSURE: 64 MMHG | OXYGEN SATURATION: 100 % | HEART RATE: 89 BPM | SYSTOLIC BLOOD PRESSURE: 160 MMHG

## 2017-12-22 VITALS — HEART RATE: 103 BPM

## 2017-12-22 VITALS
SYSTOLIC BLOOD PRESSURE: 147 MMHG | TEMPERATURE: 96.8 F | RESPIRATION RATE: 19 BRPM | DIASTOLIC BLOOD PRESSURE: 74 MMHG | OXYGEN SATURATION: 90 % | HEART RATE: 72 BPM

## 2017-12-22 LAB
ALBUMIN SERPL-MCNC: 2.2 GM/DL (ref 3.4–5)
ALP SERPL-CCNC: 72 U/L (ref 45–117)
ALT SERPL-CCNC: 12 U/L (ref 10–53)
AST SERPL-CCNC: 14 U/L (ref 15–37)
BASOPHILS # BLD AUTO: 0.1 TH/MM3 (ref 0–0.2)
BASOPHILS NFR BLD: 0.4 % (ref 0–2)
BILIRUB SERPL-MCNC: 0.4 MG/DL (ref 0.2–1)
BUN SERPL-MCNC: 5 MG/DL (ref 7–18)
CALCIUM SERPL-MCNC: 7.9 MG/DL (ref 8.5–10.1)
CHLORIDE SERPL-SCNC: 102 MEQ/L (ref 98–107)
CHOLEST SERPL-MCNC: 105 MG/DL (ref 120–200)
CHOLESTEROL/ HDL RATIO: 3.01 RATIO
CREAT SERPL-MCNC: 0.55 MG/DL (ref 0.5–1)
EOSINOPHIL # BLD: 0.3 TH/MM3 (ref 0–0.4)
EOSINOPHIL NFR BLD: 1.3 % (ref 0–4)
ERYTHROCYTE [DISTWIDTH] IN BLOOD BY AUTOMATED COUNT: 18.9 % (ref 11.6–17.2)
GFR SERPLBLD BASED ON 1.73 SQ M-ARVRAT: 107 ML/MIN (ref 89–?)
GLUCOSE SERPL-MCNC: 108 MG/DL (ref 74–106)
HCO3 BLD-SCNC: 26.7 MEQ/L (ref 21–32)
HCT VFR BLD CALC: 49.7 % (ref 35–46)
HDLC SERPL-MCNC: 34.8 MG/DL (ref 40–60)
HGB BLD-MCNC: 16 GM/DL (ref 11.6–15.3)
LDLC SERPL-MCNC: 52 MG/DL (ref 0–99)
LYMPHOCYTES # BLD AUTO: 0.8 TH/MM3 (ref 1–4.8)
LYMPHOCYTES NFR BLD AUTO: 3.9 % (ref 9–44)
MAGNESIUM SERPL-MCNC: 2.1 MG/DL (ref 1.5–2.5)
MCH RBC QN AUTO: 22.2 PG (ref 27–34)
MCHC RBC AUTO-ENTMCNC: 32.1 % (ref 32–36)
MCV RBC AUTO: 69.1 FL (ref 80–100)
MONOCYTE #: 0.8 TH/MM3 (ref 0–0.9)
MONOCYTES NFR BLD: 3.6 % (ref 0–8)
NEUTROPHILS # BLD AUTO: 19.5 TH/MM3 (ref 1.8–7.7)
NEUTROPHILS NFR BLD AUTO: 90.8 % (ref 16–70)
PHOSPHATE SERPL-MCNC: 3.7 MG/DL (ref 2.5–4.9)
PLATELET # BLD: 795 TH/MM3 (ref 150–450)
PMV BLD AUTO: 8.6 FL (ref 7–11)
PROT SERPL-MCNC: 5.2 GM/DL (ref 6.4–8.2)
RBC # BLD AUTO: 7.19 MIL/MM3 (ref 4–5.3)
SODIUM SERPL-SCNC: 138 MEQ/L (ref 136–145)
TRIGL SERPL-MCNC: 90 MG/DL (ref 42–150)
WBC # BLD AUTO: 21.5 TH/MM3 (ref 4–11)

## 2017-12-22 PROCEDURE — B2111ZZ FLUOROSCOPY OF MULTIPLE CORONARY ARTERIES USING LOW OSMOLAR CONTRAST: ICD-10-PCS | Performed by: INTERNAL MEDICINE

## 2017-12-22 PROCEDURE — 04UL0KZ SUPPLEMENT LEFT FEMORAL ARTERY WITH NONAUTOLOGOUS TISSUE SUBSTITUTE, OPEN APPROACH: ICD-10-PCS | Performed by: SURGERY

## 2017-12-22 PROCEDURE — 04CL0ZZ EXTIRPATION OF MATTER FROM LEFT FEMORAL ARTERY, OPEN APPROACH: ICD-10-PCS | Performed by: SURGERY

## 2017-12-22 PROCEDURE — 4A023N7 MEASUREMENT OF CARDIAC SAMPLING AND PRESSURE, LEFT HEART, PERCUTANEOUS APPROACH: ICD-10-PCS | Performed by: INTERNAL MEDICINE

## 2017-12-22 PROCEDURE — 04CN0ZZ EXTIRPATION OF MATTER FROM LEFT POPLITEAL ARTERY, OPEN APPROACH: ICD-10-PCS | Performed by: SURGERY

## 2017-12-22 PROCEDURE — 04UJ0KZ SUPPLEMENT LEFT EXTERNAL ILIAC ARTERY WITH NONAUTOLOGOUS TISSUE SUBSTITUTE, OPEN APPROACH: ICD-10-PCS | Performed by: SURGERY

## 2017-12-22 PROCEDURE — 04CJ0ZZ EXTIRPATION OF MATTER FROM LEFT EXTERNAL ILIAC ARTERY, OPEN APPROACH: ICD-10-PCS | Performed by: INTERNAL MEDICINE

## 2017-12-22 PROCEDURE — B2151ZZ FLUOROSCOPY OF LEFT HEART USING LOW OSMOLAR CONTRAST: ICD-10-PCS | Performed by: INTERNAL MEDICINE

## 2017-12-22 RX ADMIN — STANDARDIZED SENNA CONCENTRATE AND DOCUSATE SODIUM SCH TAB: 8.6; 5 TABLET, FILM COATED ORAL at 21:23

## 2017-12-22 RX ADMIN — Medication SCH ML: at 08:30

## 2017-12-22 RX ADMIN — OXYTOCIN SCH MLS/HR: 10 INJECTION, SOLUTION INTRAMUSCULAR; INTRAVENOUS at 22:00

## 2017-12-22 RX ADMIN — Medication SCH ML: at 21:23

## 2017-12-22 RX ADMIN — STANDARDIZED SENNA CONCENTRATE AND DOCUSATE SODIUM SCH TAB: 8.6; 5 TABLET, FILM COATED ORAL at 08:34

## 2017-12-22 RX ADMIN — HEPARIN SODIUM PRN MLS/HR: 10000 INJECTION, SOLUTION INTRAVENOUS at 18:00

## 2017-12-22 RX ADMIN — HEPARIN SODIUM PRN MLS/HR: 10000 INJECTION, SOLUTION INTRAVENOUS at 06:19

## 2017-12-22 RX ADMIN — CLOPIDOGREL BISULFATE SCH MG: 75 TABLET, FILM COATED ORAL at 08:35

## 2017-12-22 RX ADMIN — SODIUM CHLORIDE SCH MLS/HR: 900 INJECTION INTRAVENOUS at 17:00

## 2017-12-22 RX ADMIN — CLOPIDOGREL BISULFATE SCH MG: 75 TABLET, FILM COATED ORAL at 17:15

## 2017-12-22 NOTE — PD.PSY.CON
Provisional Diagnosis


Admission Date


Dec 17, 2017 at 14:44


Axis I.


Psychological factors affecting medical condition





History of Present Illness


Service


Psychiatry


Consult Requested By


Dr. Marrufo


Reason for Consult


decisional capacity


Primary Care Physician


No Primary Care Physician


HPI


Patient is a 77-year-old woman with a past medical history of hypertension, 

hyperlipidemia, brought in by EMS for generalized weakness and admitted to the 

medical floor for sepsis, dehydration, generalized weakness and total self-care 

deficit food during admission was found to have severe peripheral of vascular 

disease requiring femoropopliteal bypass surgery which psychiatry was consulted 

for decision capacity for the same.  Patient this morning was taken to cardiac 

catheter lab for catheterization and was seen for this consult note patient was 

brought in to the recovery room at which time assess her for this capacity Be 

determined due to patient's current mental status secondary to recent 

menstruation anesthesia.  Patient was unable to verbally express adequately 

answers to questioning and noted to be confused at this time.  Due to patient's 

current mentals status patient is unable to participate effectively in 

assessment for decisional capacity and therefore will need to be deferred to 

healthcare surrogate at this time to assist in making appropriate decisions's 

regarding her medical care.





Past Family Social History


Coded Allergies:  


     No Known Allergies (Unverified  Allergy, Unknown, 12/17/17)





Current Medications








 Medications


  (Trade)  Dose


 Ordered  Sig/Carlitos


 Route  Start Time


 Stop Time Status Last Admin


 


 Ceftriaxone


 Sodium 1000 mg/


 Sodium Chloride  100 ml @ 


 200 mls/hr  Q24H


 IV  12/17/17 16:00


    12/21/17 16:03


 


 


  (NS Flush)  2 ml  UNSCH  PRN


 IV FLUSH  12/17/17 16:00


     


 


 


  (NS Flush)  2 ml  BID


 IV FLUSH  12/17/17 21:00


    12/21/17 21:46


 


 


  (Tylenol)  650 mg  Q4H  PRN


 PO  12/17/17 16:00


     


 


 


  (Zofran Inj)  4 mg  Q6H  PRN


 IVP  12/17/17 16:00


     


 


 


  (Restoril)  15 mg  HS  PRN


 PO  12/17/17 16:00


     


 


 


  (Narcan Inj)  0.4 mg  UNSCH  PRN


 IV PUSH  12/17/17 16:00


     


 


 


  (Tirara-Colace)  1 tab  BID


 PO  12/17/17 21:00


    12/22/17 08:34


 


 


  (Milk Of


 Magnesia Liq)  30 ml  Q12H  PRN


 PO  12/17/17 16:00


     


 


 


  (Senokot)  17.2 mg  Q12H  PRN


 PO  12/17/17 16:00


     


 


 


  (Dulcolax Supp)  10 mg  DAILY  PRN


 RECTAL  12/17/17 16:00


     


 


 


  (Lactulose Liq)  30 ml  DAILY  PRN


 PO  12/17/17 16:00


     


 


 


  (Duoneb Neb)  1 ampule  Q4HR NEB  PRN


 NEB  12/18/17 04:00


     


 


 


 Heparin Sodium/


 Dextrose  250 ml @ 


 10 mls/hr  TITRATE  PRN


 IV  12/18/17 06:30


    12/22/17 06:19


 


 


  (Plavix)  75 mg  DAILY


 PO  12/18/17 12:00


    12/22/17 08:35


 


 


  (Lopressor)  25 mg  ON CALL  PRN


 PO  12/20/17 12:15


 12/23/17 12:14   


 


 


  (Betadine 5%


 Antisepsis Kit)  1 applic  ON CALL  PRN


 EACH NARE  12/20/17 12:15


 12/23/17 12:14   


 


 


  (Chlorhexidine


 2% Cloth)  3 pack  ON CALL  PRN


 TOPICAL  12/20/17 12:15


 12/23/17 12:14   


 


 


 Lactated Ringer's  1,000 ml @ 


 80 mls/hr  [id number]


 IV  12/20/17 13:00


     


 


 


 Lactated Ringer's  1,000 ml @ 


 30 mls/hr  Q24H PRN


 IV  12/22/17 02:00


 12/25/17 01:59   


 


 


 Sodium Chloride  500 ml @ 


 30 mls/hr  [id number] PRN


 IV  12/22/17 02:00


 12/25/17 01:59   


 


 


  (Betadine 5%


 Antisepsis Kit)  1 applic  ON CALL  PRN


 EACH NARE  12/22/17 02:00


 12/25/17 01:59   


 


 


  (Chlorhexidine


 2% Cloth)  3 pack  ON CALL  PRN


 TOPICAL  12/22/17 02:00


 12/25/17 01:59   


 











Physical Exam


Vital Signs





Vital Signs








  Date Time  Temp Pulse Resp B/P (MAP) Pulse Ox O2 Delivery O2 Flow Rate FiO2


 


12/22/17 10:50     94 Nasal Cannula 2.00 


 


12/22/17 08:00 96.8 72 19 147/74 (98)    














I/O   


 


 12/22/17 12/22/17 12/23/17





 08:00 16:00 00:00


 


Intake Total  1200 ml 


 


Output Total 600 ml 800 ml 


 


Balance -600 ml 400 ml 








Lab Results











 Date/Time


Source Procedure


Growth Status


 


 


 12/17/17 12:20


Blood Peripheral Aerobic Blood Culture - Final


NO GROWTH IN 5 DAYS Complete


 


 12/17/17 12:20


Blood Peripheral Anaerobic Blood Culture - Final


NO GROWTH IN 5 DAYS Complete


 


 12/17/17 12:00


Urine Catheterized Urine Urine Culture - Final


Escherichia Coli Complete











Mental Status Examination


Appearance:  Appropriate


Consciousness:  Lethargic


Orientation:  Person


Speech:  Slow, Incoherent


Language:  Other (deficient at this time)


Attention and Concentration:  Inadequate


Memory:  Impaired


Mood:  Other (unable to return to the patient's current mental status)


Affect:  Other (appearing lethargic)


Thought Process & Associations:  Other


Thought Content:  Other


Hallucination Type:  None


Delusion Type:  None


Insight:  Poor


Judgment:  Poor (unable to assess suicidal homicidal ideations due to patient's 

current mental status status secondary to recent administration of anesthesia.)





Assessment & Plan


Problem List:  


(1) Psychological factors affecting medical condition


ICD Codes:  F54 - Psychological and behavioral factors associated with 

disorders or diseases classified elsewhere


Assessment & Plan


Patient this time is unable to participate effectively and assessment for 

decisional capacity.  Patient likely require healthcare surrogate at this time 

to assist in making appropriate medical decisions regarding her care at this 

time.  If decision for consent for surgery is required during the time were 

patient continues to be unable to participate in assessment for this capacity, 

decisions should be deferred to healthcare surrogate to assist in consent for 

treatment.











Johnson Mckeon MD Dec 22, 2017 15:45

## 2017-12-22 NOTE — HHI.HCPN
Spoke with CM regarding attempting to locate patient's daughter. Apparently she 

has also been known to go by Mariam Reed. Google search provided same phone 

number previously attempted when searching for Mariam Burrsly #[phone] . 

Social media search conducted, potential match found. Private message sent 

requesting call to further identify. Radha requested with additional 

information on daughter's potential last name. 





Palliative care will continue to follow and address goals of care when legal 

proxy decision maker is found or patient is able to make her own decisions.











Nusrat Dixon, GALIW Dec 22, 2017 15:01

## 2017-12-23 VITALS
RESPIRATION RATE: 18 BRPM | DIASTOLIC BLOOD PRESSURE: 69 MMHG | OXYGEN SATURATION: 98 % | HEART RATE: 93 BPM | TEMPERATURE: 98.6 F | SYSTOLIC BLOOD PRESSURE: 126 MMHG

## 2017-12-23 VITALS
DIASTOLIC BLOOD PRESSURE: 70 MMHG | HEART RATE: 107 BPM | OXYGEN SATURATION: 91 % | RESPIRATION RATE: 16 BRPM | SYSTOLIC BLOOD PRESSURE: 126 MMHG | TEMPERATURE: 97.9 F

## 2017-12-23 VITALS
OXYGEN SATURATION: 96 % | RESPIRATION RATE: 16 BRPM | HEART RATE: 101 BPM | DIASTOLIC BLOOD PRESSURE: 79 MMHG | SYSTOLIC BLOOD PRESSURE: 113 MMHG | TEMPERATURE: 97.8 F

## 2017-12-23 VITALS — HEART RATE: 101 BPM

## 2017-12-23 VITALS
TEMPERATURE: 99.2 F | RESPIRATION RATE: 14 BRPM | SYSTOLIC BLOOD PRESSURE: 143 MMHG | HEART RATE: 99 BPM | DIASTOLIC BLOOD PRESSURE: 61 MMHG | OXYGEN SATURATION: 98 %

## 2017-12-23 VITALS
TEMPERATURE: 98.5 F | RESPIRATION RATE: 16 BRPM | HEART RATE: 97 BPM | SYSTOLIC BLOOD PRESSURE: 130 MMHG | DIASTOLIC BLOOD PRESSURE: 59 MMHG | OXYGEN SATURATION: 98 %

## 2017-12-23 VITALS
HEART RATE: 88 BPM | OXYGEN SATURATION: 95 % | SYSTOLIC BLOOD PRESSURE: 117 MMHG | TEMPERATURE: 98 F | RESPIRATION RATE: 14 BRPM | DIASTOLIC BLOOD PRESSURE: 57 MMHG

## 2017-12-23 VITALS — HEART RATE: 93 BPM

## 2017-12-23 VITALS — HEART RATE: 89 BPM

## 2017-12-23 LAB
ERYTHROCYTE [DISTWIDTH] IN BLOOD BY AUTOMATED COUNT: 18.5 % (ref 11.6–17.2)
HCT VFR BLD CALC: 47.6 % (ref 35–46)
HGB BLD-MCNC: 15.7 GM/DL (ref 11.6–15.3)
MCH RBC QN AUTO: 22.7 PG (ref 27–34)
MCHC RBC AUTO-ENTMCNC: 33 % (ref 32–36)
MCV RBC AUTO: 68.8 FL (ref 80–100)
PLATELET # BLD: 801 TH/MM3 (ref 150–450)
PMV BLD AUTO: 8.8 FL (ref 7–11)
RBC # BLD AUTO: 6.92 MIL/MM3 (ref 4–5.3)
WBC # BLD AUTO: 22.7 TH/MM3 (ref 4–11)

## 2017-12-23 RX ADMIN — Medication SCH ML: at 09:07

## 2017-12-23 RX ADMIN — STANDARDIZED SENNA CONCENTRATE AND DOCUSATE SODIUM SCH TAB: 8.6; 5 TABLET, FILM COATED ORAL at 21:02

## 2017-12-23 RX ADMIN — HEPARIN SODIUM PRN MLS/HR: 10000 INJECTION, SOLUTION INTRAVENOUS at 12:44

## 2017-12-23 RX ADMIN — STANDARDIZED SENNA CONCENTRATE AND DOCUSATE SODIUM SCH TAB: 8.6; 5 TABLET, FILM COATED ORAL at 09:07

## 2017-12-23 RX ADMIN — Medication SCH ML: at 21:02

## 2017-12-23 RX ADMIN — OXYTOCIN SCH MLS/HR: 10 INJECTION, SOLUTION INTRAMUSCULAR; INTRAVENOUS at 03:30

## 2017-12-23 RX ADMIN — SODIUM CHLORIDE SCH MLS/HR: 900 INJECTION INTRAVENOUS at 17:09

## 2017-12-23 RX ADMIN — CLOPIDOGREL BISULFATE SCH MG: 75 TABLET, FILM COATED ORAL at 09:07

## 2017-12-23 NOTE — HHI.PR
Subjective


Remarks


Patient just came to the 1700 floor, she told me "I guess I'm fine ", patient 

had a revascularization yesterday by Dr. PEREZ, discussed with the nurse know right 

pulse today in the lower extremity, left pulse appreciated by Doppler


She is afebrile, femoropopliteal surgery for Dr. PEREZ





Objective


Vitals





Vital Signs








  Date Time  Temp Pulse Resp B/P (MAP) Pulse Ox O2 Delivery O2 Flow Rate FiO2


 


12/23/17 12:00  99      


 


12/23/17 12:00 99.2 99 14 143/61 (88) 98   


 


12/23/17 10:00  101      


 


12/23/17 08:00 98.5 97 16 130/59 (82) 98   


 


12/23/17 08:00  97      


 


12/23/17 06:00  89      


 


12/23/17 04:00 98.0 88 14 117/57 (77) 95   


 


12/23/17 04:00  88      


 


12/23/17 02:00  93      


 


12/23/17 00:00 97.8 101 16 113/79 (90) 96   


 


12/23/17 00:00  101      


 


12/22/17 22:00  103      


 


12/22/17 20:00 98.0 92 20 160/64 (96) 100   


 


12/22/17 20:00  89      


 


12/22/17 18:35 97.3 98 15 156/72 (100) 93   





    154/64 (94)    


 


12/22/17 17:30 97.4 91 14 129/66 (87) 97 Nasal Cannula 3 














I/O      


 


 12/22/17 12/22/17 12/22/17 12/23/17 12/23/17 12/23/17





 07:00 15:00 23:00 07:00 15:00 23:00


 


Intake Total   1200 ml 320 ml 235 ml 


 


Output Total 600 ml  800 ml 400 ml  


 


Balance -600 ml  400 ml -80 ml 235 ml 


 


      


 


Intake Oral    320 ml  


 


IV Total     235 ml 


 


Other   1200 ml   


 


Output Urine Total 600 ml  500 ml 400 ml  


 


Estimated Blood Loss   300 ml   


 


# Bowel Movements    0  








Result Diagram:  


12/23/17 0550                                                                  

              12/22/17 4726





Objective Remarks


GENERAL: This is a well-nourished, well-developed patient, in no apparent 

distress.


CARDIOVASCULAR: Regular rate and rhythm without murmurs, gallops, or rubs. 


RESPIRATORY: Clear to auscultation. Breath sounds equal bilaterally. No wheezes

, rales, or rhonchi.  


GASTROINTESTINAL: Abdomen soft, non-tender, nondistended. Normal active bowel 

sounds


MUSCULOSKELETAL: Extremities without clubbing, cyanosis, or edema.


NEURO:  Alert & Oriented x4 to person, place, time, situation.  Moves all ext x4


Procedures











KASHIF THAPA











 **Signed**





 EXAM DATE/TIME:  12/17/2017 17:47 


 


[facility] COMPARISON:     


No previous studies available for comparison.


 


 


INDICATIONS :     


Right leg pain; evaluate for occlusion.


                      


 


IV CONTRAST:     


100 cc Omnipaque 350 (iohexol) IV 


 


 


RADIATION DOSE:     


2.3 CTDIvol (mGy) 


 


 


MEDICAL HISTORY :     


Cerebrovascular disease.  


 


SURGICAL HISTORY :      


Tubal ligation. 


 


ENCOUNTER:     


Initial


 


ACUITY:     


1 month


 


PAIN SCALE:     


7/10


 


LOCATION:      


Right lower leg


 


TECHNIQUE:     


Volumetric scanning was performed using a multi-row detector CT scanner.  The 

data was post processed with a variety of visualization algorithms including 

full volume maximum intensity projection, multi-planar sliding thin slab 

reformation, curved planar reformation, and surface rendering techniques.  

Using automated exposure control and adjustment of the mA and/or kV according 

to patient size, radiation dose was kept as low as reasonably achievable to 

obtain optimal diagnostic quality images.   DICOM format image data is 

available electronically for review and comparison.  


 


FINDINGS:     


There are atherosclerotic changes seen throughout the arterial system.  The 

abdominal aorta measures up to 2.9 cm.  At the distal infrarenal abdominal 

aorta thrombus occupies more than half of the lumen.  The functional lumen is 

seen at the left lateral aspect of the distal abdominal aorta.  There is 

atherosclerotic change at the common iliac arteries bilaterally.  A significant 

stenosis is not seen at this level is not seen.  There does appear to be severe 

stenosis and possible occlusion at the origin of the right internal iliac 

artery.  There is mild plaque seen at the right external iliac artery.  There 

is a severe stenosis at the distal left external iliac artery.


 


There is atherosclerotic change seen at the common femoral arteries bilaterally 

being more severe on the right.  There is a severe stenosis at the right common 

femoral artery narrowing the lumen by approximately 80%.  There is severe 

narrowing of the proximal right profunda femoris artery.  There is abrupt 

occlusion of the proximal right superficial femoral artery.  Flow in the thigh 

is continues through collaterals in the profunda femoris artery distribution.  

There is reconstitution of the distal popliteal artery at the level of the 

distal femur via collaterals.  There is a normal trifurcation.  The right 

anterior tibial artery can be traced to the ankle.  The posterior tibial and 

peritoneal arteries can be seen to the distal lower leg but can not clearly be 

traced into the foot.  


 


Again noted is the mild stenosis at the left common femoral artery.  There is 

occlusion of the proximal left superficial femoral artery.  The popliteal 

artery is patent throughout.  There is reconstitution of the distal superficial 

femoral artery at the level of the distal femoral shaft.  The popliteal artery 

is patent.  There is a severe stenosis at the mid popliteal artery.  The lumen 

is narrowed by over 75%.  The more distal popliteal artery is patent.  The 

anterior and posterior tibial artery could be traced to the foot.  The 

peritoneal artery can be traced to the ankle region.  


 


Atherosclerotic calcifications are seen throughout the origins of the vessels 

in the upper abdomen.  There does appear to be at least a moderate stenosis at 

the origin of the right renal artery.  The left renal artery appears patent.  

The celiac, SMA and DMITRI are patent.  


 


There is a mild hiatal hernia.  The liver, spleen, pancreas and kidneys appear 

grossly normal.  The adrenal glands are grossly normal.  The pelvic structures 

appear intact.  The patient does have a Arevalo catheter in the urinary bladder.  

There is degenerative change in the lumbar spine.  


 


CONCLUSION:


1. Atherosclerotic changes seen throughout the arterial system, including 

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 

mural thrombus.


2. Atherosclerotic change in the external iliac and common femoral arteries 

bilaterally as described above. 


3. Occlusion of the superficial femoral arteries bilaterally with 

reconstitution distally.  The reconstitution is higher on the left side. 


4. Normal trifurcation vessels seen on the left side.


5. Diminished flow seen at the right trifurcation vessels.  The vessels can 

only be well seen on the delayed images.  The vessels can not be traced into 

the foot.      


 


 


 


 Emir Sorensen MD on December 17, 2017 at 19:28                


Board Certified Radiologist.


 This report was verified electronically.   














On 12/22 by Dr. PEREZ :


Exploration of the left groin, left common femoral and external iliac artery.


Endarterectomy and patch angioplasty.  External and common artery


thromboembolectomy, superficial femoral popliteal artery thromboembolectomy.


Arteriogram.





A/P


Assessment and Plan


12/23: Patient status post revascularization, further intervention per [facility]








A/P:


Peripheral arterial vascular disease with Bilateral LE  discoloration and 

unable to feel DP pulses. Patient with pain and cold extremities.  Status post 

CTA Ao run off, vascular surgery  Dr. SANTOS FOLLOWING FOR femoropopliteal 

bypass





Started on antibiotic Rocephin IV . Received vanco and zosyn in the ED. 

Received bolus of NS in the eD. Continue IVF. Monitor VS closely. 


Urine cultures shows Escherichia coli UTI sensitive to Rocephin  





Restart home meds as appropriate patient says she currently doesn't take any 

meds. 








Consult PT for eval CONSULT OT





Consult case management for DC plan 





DVT ppx lovenox





History of cerebrovascular accident 





Gait instability possibly secondary to cerebrovascular accident versus 

peripheral vascular occlusive disease





Deconditioning Will need SNF and PT and OT











Marcus Childers MD Dec 23, 2017 17:06

## 2017-12-23 NOTE — PD.CAR.PN
CVT Progress Note


Subjective/Hospital Course:


Referral received


Full consult to follow


Severe for peripheral vascular disease will require reconstruction


Valentin PEREZ


12/19/17


As noted in my consultation this patient has bilateral severe peripheral 

vascular disease


On top of that she is being treated for urosepsis and general decline


Patient has not been in the hospital for 3 days and has gradually improved for 

her urosepsis based on clinical exam level of alertness and diagnostic studies


Despite heparin drip the right leg is looking worse and worse and patient now 

has ischemia that needs attention in the resolution


In the best of worlds I would like to wait another while to do the surgery 

considering patient's other issues but that this point if surgery is not 

performed patient is a very high risk of losing her leg


I have reviewed patient's echocardiogram and studies and she is at this point 

moderate risk for vascular surgery but I believe options a limited and we have 

to go ahead with it.


Therefore patient will be scheduled for common femoral endarterectomy and 

femoropopliteal bypass tomorrow


I explained the risks and benefits of the procedure and all patient is very 

quiet she is clearly awake and alert and understands the discussion


Have tried to reach her daughter however number is apparently disconnected 


12/2017


Patient originally scheduled for surgery today however EKG reveals some 

anterior ischemia in addition to inferior ischemia that was noted on previous 

EKG


In discussion with the anesthesiologist the decision is made to postpone the 

surgery until patient can be worked up cardiac wise in more detail so we get a 

better picture on cardiac risk and possible remedy


Right leg and foot are ischemic and the cyanotic however while this surgery is 

urgent it is not an emergent and cardiac issues trump any other issue right now


We'll consult cardiology for full evaluation possible stress test


Restart heparin


All things equal once cardiac workup completed we'll proceed with vascular 

bypass and reconstruction on the right leg


12/21/17


Right leg and foot remain ischemic and cyanotic


Discussed with Dr. Pastor


This patient will lose her leg if for no surgery is done in next few days.  On 

the other hand patient has significant coronary artery disease and major other 

comorbidities and hence the high risk


Dr. Pastor we'll proceed with cardiac catheterization through the left leg and 

based on that will decide which way to go


There is of course a good chance the patient will be candidate for any surgery 

and that his then the rendering but we should make every effort to make patient 

at least be able to sustain a limb saving operation.


On the other hand limb saving operation has not much value if patient loses her 

life in the process and therefore every effort should be made to get patient in 

shape good enough to tolerate surgery


12/23/17


Patient status post revascularization of the left leg


Incision is clean and dry


Patient has warm foot with normal capillary refill and strong dopplerable 

popliteal, dorsalis pedis posterior tibial pulses


Right leg is still cyanotic and imminently at risk


We'll give patient another day and address this tomorrow.


Most likely all patient needs is iliofemoral endarterectomy and patch and 

during the surgery we will see if she needs a femoropopliteal bypass in 

addition but I would like to minimize the amount of surgery on this unfortunate 

lady


Transfer to floor today


Objective:





Vital Signs








  Date Time  Temp Pulse Resp B/P (MAP) Pulse Ox O2 Delivery O2 Flow Rate FiO2


 


12/23/17 10:00  101      


 


12/23/17 08:00 98.5 97 16 130/59 (82) 98   


 


12/23/17 08:00  97      


 


12/23/17 06:00  89      


 


12/23/17 04:00 98.0 88 14 117/57 (77) 95   


 


12/23/17 04:00  88      


 


12/23/17 02:00  93      


 


12/23/17 00:00 97.8 101 16 113/79 (90) 96   


 


12/23/17 00:00  101      


 


12/22/17 22:00  103      


 


12/22/17 20:00 98.0 92 20 160/64 (96) 100   


 


12/22/17 20:00  89      


 


12/22/17 18:35 97.3 98 15 156/72 (100) 93   





    154/64 (94)    


 


12/22/17 17:30 97.4 91 14 129/66 (87) 97 Nasal Cannula 3 


 


12/22/17 17:00  88 13 128/64 (85) 96 Nasal Cannula 3 


 


12/22/17 16:30  101 24 146/67 (93) 96 Nasal Cannula 3 


 


12/22/17 16:00  88 13 155/79 (104) 98 Nasal Cannula 3 


 


12/22/17 15:45  93 14 139/78 (98) 99 Nasal Cannula 3 


 


12/22/17 15:30  96 14 144/72 (96) 100 Nasal Cannula 3 


 


12/22/17 15:15  92 12 136/78 (97) 100 Nasal Cannula 3 


 


12/22/17 15:12 96.7 93 12 135/78 (97) 100 Nasal Cannula 3 


 


12/22/17 10:50     94 Nasal Cannula 2.00 








Labs:





Laboratory Tests








Test


  12/23/17


00:05 12/23/17


05:50


 


Activated Partial


Thromboplast Time 70.9 SEC


(24.3-30.1) 88.8 SEC


(24.3-30.1)


 


White Blood Count


  


  22.7 TH/MM3


(4.0-11.0)


 


Red Blood Count


  


  6.92 MIL/MM3


(4.00-5.30)


 


Hemoglobin


  


  15.7 GM/DL


(11.6-15.3)


 


Hematocrit


  


  47.6 %


(35.0-46.0)


 


Mean Corpuscular Volume


  


  68.8 FL


(80.0-100.0)


 


Mean Corpuscular Hemoglobin


  


  22.7 PG


(27.0-34.0)


 


Mean Corpuscular Hemoglobin


Concent 


  33.0 %


(32.0-36.0)


 


Red Cell Distribution Width


  


  18.5 %


(11.6-17.2)


 


Platelet Count


  


  801 TH/MM3


(150-450)


 


Mean Platelet Volume


  


  8.8 FL


(7.0-11.0)








Result Diagram:  


12/23/17 0550                                                                  

              12/22/17 1726








(1) Peripheral vascular disease


(2) CAD (coronary artery disease)


(3) Cardiomyopathy











Amber Acosta MD Dec 23, 2017 10:39

## 2017-12-23 NOTE — PD.CARD.PN
Subjective


Subjective Remarks


No CP or SOB, tolerated surgery well





Objective


Medications





Current Medications








 Medications


  (Trade)  Dose


 Ordered  Sig/Carlitos


 Route  Start Time


 Stop Time Status Last Admin


 


 Ceftriaxone


 Sodium 1000 mg/


 Sodium Chloride  100 ml @ 


 200 mls/hr  Q24H


 IV  12/17/17 16:00


    12/22/17 17:00


 


 


  (NS Flush)  2 ml  UNSCH  PRN


 IV FLUSH  12/17/17 16:00


     


 


 


  (NS Flush)  2 ml  BID


 IV FLUSH  12/17/17 21:00


    12/23/17 09:07


 


 


  (Tylenol)  650 mg  Q4H  PRN


 PO  12/17/17 16:00


     


 


 


  (Zofran Inj)  4 mg  Q6H  PRN


 IVP  12/17/17 16:00


     


 


 


  (Restoril)  15 mg  HS  PRN


 PO  12/17/17 16:00


     


 


 


  (Narcan Inj)  0.4 mg  UNSCH  PRN


 IV PUSH  12/17/17 16:00


     


 


 


  (Tiarra-Colace)  1 tab  BID


 PO  12/17/17 21:00


    12/23/17 09:07


 


 


  (Milk Of


 Magnesia Liq)  30 ml  Q12H  PRN


 PO  12/17/17 16:00


     


 


 


  (Senokot)  17.2 mg  Q12H  PRN


 PO  12/17/17 16:00


     


 


 


  (Dulcolax Supp)  10 mg  DAILY  PRN


 RECTAL  12/17/17 16:00


     


 


 


  (Lactulose Liq)  30 ml  DAILY  PRN


 PO  12/17/17 16:00


     


 


 


  (Duoneb Neb)  1 ampule  Q4HR NEB  PRN


 NEB  12/18/17 04:00


     


 


 


 Heparin Sodium/


 Dextrose  250 ml @ 


 10 mls/hr  TITRATE  PRN


 IV  12/18/17 06:30


    12/23/17 12:44


 


 


 Lactated Ringer's  1,000 ml @ 


 40 mls/hr  Q24H


 IV  12/20/17 13:00


    12/22/17 22:00


 


 


 Lactated Ringer's  1,000 ml @ 


 30 mls/hr  Q24H PRN


 IV  12/22/17 02:00


 12/25/17 01:59   


 


 


 Sodium Chloride  500 ml @ 


 30 mls/hr  [id number] PRN


 IV  12/22/17 02:00


 12/25/17 01:59   


 


 


  (Betadine 5%


 Antisepsis Kit)  1 applic  ON CALL  PRN


 EACH NARE  12/22/17 02:00


 12/25/17 01:59   


 


 


  (Chlorhexidine


 2% Cloth)  3 pack  ON CALL  PRN


 TOPICAL  12/22/17 02:00


 12/25/17 01:59   


 


 


  (Plavix)  75 mg  DAILY


 PO  12/22/17 17:15


    12/23/17 09:07


 








Vital Signs / I&O





Vital Signs








  Date Time  Temp Pulse Resp B/P (MAP) Pulse Ox O2 Delivery O2 Flow Rate FiO2


 


12/23/17 12:00  99      


 


12/23/17 12:00 99.2 99 14 143/61 (88) 98   


 


12/23/17 10:00  101      


 


12/23/17 08:00 98.5 97 16 130/59 (82) 98   


 


12/23/17 08:00  97      


 


12/23/17 06:00  89      


 


12/23/17 04:00 98.0 88 14 117/57 (77) 95   


 


12/23/17 04:00  88      


 


12/23/17 02:00  93      


 


12/23/17 00:00 97.8 101 16 113/79 (90) 96   


 


12/23/17 00:00  101      


 


12/22/17 22:00  103      


 


12/22/17 20:00 98.0 92 20 160/64 (96) 100   


 


12/22/17 20:00  89      


 


12/22/17 18:35 97.3 98 15 156/72 (100) 93   





    154/64 (94)    


 


12/22/17 17:30 97.4 91 14 129/66 (87) 97 Nasal Cannula 3 


 


12/22/17 17:00  88 13 128/64 (85) 96 Nasal Cannula 3 


 


12/22/17 16:30  101 24 146/67 (93) 96 Nasal Cannula 3 


 


12/22/17 16:00  88 13 155/79 (104) 98 Nasal Cannula 3 


 


12/22/17 15:45  93 14 139/78 (98) 99 Nasal Cannula 3 


 


12/22/17 15:30  96 14 144/72 (96) 100 Nasal Cannula 3 














I/O      


 


 12/22/17 12/22/17 12/22/17 12/23/17 12/23/17 12/23/17





 07:00 15:00 23:00 07:00 15:00 23:00


 


Intake Total   1200 ml 320 ml 235 ml 


 


Output Total 600 ml  800 ml 400 ml  


 


Balance -600 ml  400 ml -80 ml 235 ml 


 


      


 


Intake Oral    320 ml  


 


IV Total     235 ml 


 


Other   1200 ml   


 


Output Urine Total 600 ml  500 ml 400 ml  


 


Estimated Blood Loss   300 ml   


 


# Bowel Movements    0  








Physical Exam


GENERAL: In NAD


SKIN: Warm and dry.


HEAD: Normocephalic.


EYES: No scleral icterus. No injection or drainage. 


NECK: Supple, trachea midline. No JVD or lymphadenopathy.


CARDIOVASCULAR: Regular rate and rhythm without murmurs, gallops, or rubs. 


RESPIRATORY: Breath sounds equal bilaterally. No accessory muscle use.


GASTROINTESTINAL: Abdomen soft, non-tender, nondistended. 


MUSCULOSKELETAL: No cyanosis, or edema.





Laboratory





Laboratory Tests








Test


  12/22/17


17:26 12/23/17


00:05 12/23/17


05:50 12/23/17


12:29


 


White Blood Count 21.5 TH/MM3   22.7 TH/MM3  


 


Red Blood Count 7.19 MIL/MM3   6.92 MIL/MM3  


 


Hemoglobin 16.0 GM/DL   15.7 GM/DL  


 


Hematocrit 49.7 %   47.6 %  


 


Mean Corpuscular Volume 69.1 FL   68.8 FL  


 


Mean Corpuscular Hemoglobin 22.2 PG   22.7 PG  


 


Mean Corpuscular Hemoglobin


Concent 32.1 % 


  


  33.0 % 


  


 


 


Red Cell Distribution Width 18.9 %   18.5 %  


 


Platelet Count 795 TH/MM3   801 TH/MM3  


 


Mean Platelet Volume 8.6 FL   8.8 FL  


 


Neutrophils (%) (Auto) 90.8 %    


 


Lymphocytes (%) (Auto) 3.9 %    


 


Monocytes (%) (Auto) 3.6 %    


 


Eosinophils (%) (Auto) 1.3 %    


 


Basophils (%) (Auto) 0.4 %    


 


Neutrophils # (Auto) 19.5 TH/MM3    


 


Lymphocytes # (Auto) 0.8 TH/MM3    


 


Monocytes # (Auto) 0.8 TH/MM3    


 


Eosinophils # (Auto) 0.3 TH/MM3    


 


Basophils # (Auto) 0.1 TH/MM3    


 


CBC Comment DIFF FINAL    


 


Differential Comment     


 


Blood Urea Nitrogen 5 MG/DL    


 


Creatinine 0.55 MG/DL    


 


Random Glucose 108 MG/DL    


 


Total Protein 5.2 GM/DL    


 


Albumin 2.2 GM/DL    


 


Calcium Level 7.9 MG/DL    


 


Phosphorus Level 3.7 MG/DL    


 


Magnesium Level 2.1 MG/DL    


 


Alkaline Phosphatase 72 U/L    


 


Aspartate Amino Transf


(AST/SGOT) 14 U/L 


  


  


  


 


 


Alanine Aminotransferase


(ALT/SGPT) 12 U/L 


  


  


  


 


 


Total Bilirubin 0.4 MG/DL    


 


Sodium Level 138 MEQ/L    


 


Potassium Level 3.4 MEQ/L    


 


Chloride Level 102 MEQ/L    


 


Carbon Dioxide Level 26.7 MEQ/L    


 


Anion Gap 9 MEQ/L    


 


Estimat Glomerular Filtration


Rate 107 ML/MIN 


  


  


  


 


 


Triglycerides Level 90 MG/DL    


 


Cholesterol Level 105 MG/DL    


 


LDL Cholesterol 52 MG/DL    


 


HDL Cholesterol 34.8 MG/DL    


 


Cholesterol/HDL Ratio 3.01 RATIO    


 


Activated Partial


Thromboplast Time 


  70.9 SEC 


  88.8 SEC 


  61.7 SEC 


 











Assessment and Plan


Problem List:  


(1) Peripheral vascular disease


ICD Codes:  I73.9 - Peripheral vascular disease, unspecified


Status:  Acute


(2) CAD (coronary artery disease)


ICD Codes:  I25.10 - Atherosclerotic heart disease of native coronary artery 

without angina pectoris


(3) Cardiomyopathy


ICD Codes:  I42.9 - Cardiomyopathy, unspecified


Assessment and Plan


Tolerated LLE revascularization well. Remains stable from cardiac standpoint. 

Continue current program. R LE revascularization planned as well.











Gonzalo Gerard MD Dec 23, 2017 15:31

## 2017-12-23 NOTE — HHI.PR
Subjective


Remarks











 DELAYED ENTRY NOTE FROM 12/22/2017





Patient is 77-year-old female with PMH of HTN, HLD brought into the emergency 

department via EMS for evaluation of generalized weakness.  Patient is a poor 

historian. Per their report she has been laying on her couch for the last 7 

days unable to get up because she has been weak.  Apparently either patient or 

daughter called 911.  Patient states that she has not been cleaned in several 

days.  Patient states she has not been eating well.  EMS reported that living 

conditions were suboptimal at best.  Patient smelled of feces and urine on 

arrival. Further work up reveals UTI  with sepsis. Started on abb after 

cultures obtained. 


Patient also was noted with discolored LE bluish discoloration, cold LE , and 

no pulses detected by US. Ao run off ordered and also vascular surgeon 

consulted for further eval.





12-18 DW DR SANTOS WILL NEED SURGERY LATER THIS WEEK


RK RN AND PT AND CM 


NO CURRENT COMPLAINTS


HAS PATINO IN PLACE


[facility] LAB[facility]


WILL NEED SNF AT [place]





12-19 has been seen by palliative care, they are  not able to locate any family 

at this time yet


Patient will need surgery likely femoropopliteal bypass


Discussed with patient and RN


Complains of some pain in the right lower extremity


A.m. labs


Continue on Rocephin for UTI





12-20 WAS TO HAVE SURGERY BUT WAS NOT CLEARED BY ANESTHESIA HAD ABNORMAL EKG


SO SURGERY WAS PUT ON HOLD AND CARDIOLOGY WAS CONSULTED


RK RN AND PT AND CM





12-21 patient is to go for nuclear stress test today per cardiology


Depending on results we'll determine her clearance by cardiology


Surgery is obviously on hold at this time


Will need femoropopliteal bypass more than likely


No current complaints





12/22: Seen in DOCU post heart catheter, stable no chest pain, febrile, 

discussed with Dr. PEREZ [facility] is planning on taking patient to [facility] possibly today


Heart catheter initial verbal report is negative.





Objective


Vitals





Vital Signs








  Date Time  Temp Pulse Resp B/P (MAP) Pulse Ox O2 Delivery O2 Flow Rate FiO2


 


12/23/17 06:00  89      


 


12/23/17 04:00 98.0 88 14 117/57 (77) 95   


 


12/23/17 04:00  88      


 


12/23/17 02:00  93      


 


12/23/17 00:00 97.8 101 16 113/79 (90) 96   


 


12/23/17 00:00  101      


 


12/22/17 22:00  103      


 


12/22/17 20:00 98.0 92 20 160/64 (96) 100   


 


12/22/17 20:00  89      


 


12/22/17 18:35 97.3 98 15 156/72 (100) 93   





    154/64 (94)    


 


12/22/17 17:30 97.4 91 14 129/66 (87) 97 Nasal Cannula 3 


 


12/22/17 17:00  88 13 128/64 (85) 96 Nasal Cannula 3 


 


12/22/17 16:30  101 24 146/67 (93) 96 Nasal Cannula 3 


 


12/22/17 16:00  88 13 155/79 (104) 98 Nasal Cannula 3 


 


12/22/17 15:45  93 14 139/78 (98) 99 Nasal Cannula 3 


 


12/22/17 15:30  96 14 144/72 (96) 100 Nasal Cannula 3 


 


12/22/17 15:15  92 12 136/78 (97) 100 Nasal Cannula 3 


 


12/22/17 15:12 96.7 93 12 135/78 (97) 100 Nasal Cannula 3 


 


12/22/17 10:50     94 Nasal Cannula 2.00 














I/O      


 


 12/22/17 12/22/17 12/22/17 12/23/17 12/23/17 12/23/17





 07:00 15:00 23:00 07:00 15:00 23:00


 


Intake Total   1200 ml 320 ml  


 


Output Total 600 ml  800 ml 400 ml  


 


Balance -600 ml  400 ml -80 ml  


 


      


 


Intake Oral    320 ml  


 


Other   1200 ml   


 


Output Urine Total 600 ml  500 ml 400 ml  


 


Estimated Blood Loss   300 ml   


 


# Bowel Movements    0  








Result Diagram:  


12/23/17 0550                                                                  

              12/22/17 1726





Objective Remarks


GENERAL: This is a well-nourished, well-developed patient, in no apparent 

distress.


CARDIOVASCULAR: Regular rate and rhythm without murmurs, gallops, or rubs. 


RESPIRATORY: Clear to auscultation. Breath sounds equal bilaterally. No wheezes

, rales, or rhonchi.  


GASTROINTESTINAL: Abdomen soft, non-tender, nondistended. Normal active bowel 

sounds


MUSCULOSKELETAL: Extremities without clubbing, cyanosis, or edema.


NEURO:  Alert & Oriented x4 to person, place, time, situation.  Moves all ext x4


Procedures











KASHIF THAPA











 **Signed**





 EXAM DATE/TIME:  12/17/2017 17:47 


 


[facility] COMPARISON:     


No previous studies available for comparison.


 


 


INDICATIONS :     


Right leg pain; evaluate for occlusion.


                      


 


IV CONTRAST:     


100 cc Omnipaque 350 (iohexol) IV 


 


 


RADIATION DOSE:     


2.3 CTDIvol (mGy) 


 


 


MEDICAL HISTORY :     


Cerebrovascular disease.  


 


SURGICAL HISTORY :      


Tubal ligation. 


 


ENCOUNTER:     


Initial


 


ACUITY:     


1 month


 


PAIN SCALE:     


7/10


 


LOCATION:      


Right lower leg


 


TECHNIQUE:     


Volumetric scanning was performed using a multi-row detector CT scanner.  The 

data was post processed with a variety of visualization algorithms including 

full volume maximum intensity projection, multi-planar sliding thin slab 

reformation, curved planar reformation, and surface rendering techniques.  

Using automated exposure control and adjustment of the mA and/or kV according 

to patient size, radiation dose was kept as low as reasonably achievable to 

obtain optimal diagnostic quality images.   DICOM format image data is 

available electronically for review and comparison.  


 


FINDINGS:     


There are atherosclerotic changes seen throughout the arterial system.  The 

abdominal aorta measures up to 2.9 cm.  At the distal infrarenal abdominal 

aorta thrombus occupies more than half of the lumen.  The functional lumen is 

seen at the left lateral aspect of the distal abdominal aorta.  There is 

atherosclerotic change at the common iliac arteries bilaterally.  A significant 

stenosis is not seen at this level is not seen.  There does appear to be severe 

stenosis and possible occlusion at the origin of the right internal iliac 

artery.  There is mild plaque seen at the right external iliac artery.  There 

is a severe stenosis at the distal left external iliac artery.


 


There is atherosclerotic change seen at the common femoral arteries bilaterally 

being more severe on the right.  There is a severe stenosis at the right common 

femoral artery narrowing the lumen by approximately 80%.  There is severe 

narrowing of the proximal right profunda femoris artery.  There is abrupt 

occlusion of the proximal right superficial femoral artery.  Flow in the thigh 

is continues through collaterals in the profunda femoris artery distribution.  

There is reconstitution of the distal popliteal artery at the level of the 

distal femur via collaterals.  There is a normal trifurcation.  The right 

anterior tibial artery can be traced to the ankle.  The posterior tibial and 

peritoneal arteries can be seen to the distal lower leg but can not clearly be 

traced into the foot.  


 


Again noted is the mild stenosis at the left common femoral artery.  There is 

occlusion of the proximal left superficial femoral artery.  The popliteal 

artery is patent throughout.  There is reconstitution of the distal superficial 

femoral artery at the level of the distal femoral shaft.  The popliteal artery 

is patent.  There is a severe stenosis at the mid popliteal artery.  The lumen 

is narrowed by over 75%.  The more distal popliteal artery is patent.  The 

anterior and posterior tibial artery could be traced to the foot.  The 

peritoneal artery can be traced to the ankle region.  


 


Atherosclerotic calcifications are seen throughout the origins of the vessels 

in the upper abdomen.  There does appear to be at least a moderate stenosis at 

the origin of the right renal artery.  The left renal artery appears patent.  

The celiac, SMA and DMITRI are patent.  


 


There is a mild hiatal hernia.  The liver, spleen, pancreas and kidneys appear 

grossly normal.  The adrenal glands are grossly normal.  The pelvic structures 

appear intact.  The patient does have a Patino catheter in the urinary bladder.  

There is degenerative change in the lumbar spine.  


 


CONCLUSION:


1. Atherosclerotic changes seen throughout the arterial system, including 

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 

mural thrombus.


2. Atherosclerotic change in the external iliac and common femoral arteries 

bilaterally as described above. 


3. Occlusion of the superficial femoral arteries bilaterally with 

reconstitution distally.  The reconstitution is higher on the left side. 


4. Normal trifurcation vessels seen on the left side.


5. Diminished flow seen at the right trifurcation vessels.  The vessels can 

only be well seen on the delayed images.  The vessels can not be traced into 

the foot.      


 


 


 


 Emir Sorensen MD on December 17, 2017 at 19:28                


Board Certified Radiologist.


 This report was verified electronically.





A/P


Assessment and Plan





Peripheral arterial vascular disease with Bilateral LE  discoloration and 

unable to feel DP pulses. Patient with pain and cold extremities.  Status post 

CTA Ao run off, vascular surgery  Dr. SANTOS FOLLOWING FOR femoropopliteal 

bypass





Started on antibiotic Rocephin IV . Received vanco and zosyn in the ED. 

Received bolus of NS in the eD. Continue IVF. Monitor VS closely. 


Urine cultures shows Escherichia coli UTI sensitive to Rocephin  





Restart home meds as appropriate patient says she currently doesn't take any 

meds. 








Consult PT for eval CONSULT OT





Consult case management for DC plan 





DVT ppx lovenox





History of cerebrovascular accident 





Gait instability possibly secondary to cerebrovascular accident versus 

peripheral vascular occlusive disease





Deconditioning Will need SNF and PT and OT











Marcus Childers MD Dec 23, 2017 08:53

## 2017-12-24 VITALS
DIASTOLIC BLOOD PRESSURE: 81 MMHG | RESPIRATION RATE: 18 BRPM | SYSTOLIC BLOOD PRESSURE: 174 MMHG | HEART RATE: 112 BPM | OXYGEN SATURATION: 91 % | TEMPERATURE: 98.2 F

## 2017-12-24 VITALS
DIASTOLIC BLOOD PRESSURE: 99 MMHG | RESPIRATION RATE: 20 BRPM | HEART RATE: 119 BPM | SYSTOLIC BLOOD PRESSURE: 179 MMHG | OXYGEN SATURATION: 91 % | TEMPERATURE: 97.5 F

## 2017-12-24 VITALS
RESPIRATION RATE: 26 BRPM | SYSTOLIC BLOOD PRESSURE: 193 MMHG | DIASTOLIC BLOOD PRESSURE: 114 MMHG | OXYGEN SATURATION: 92 % | HEART RATE: 165 BPM

## 2017-12-24 VITALS — HEART RATE: 98 BPM

## 2017-12-24 VITALS
RESPIRATION RATE: 24 BRPM | DIASTOLIC BLOOD PRESSURE: 60 MMHG | OXYGEN SATURATION: 92 % | HEART RATE: 87 BPM | SYSTOLIC BLOOD PRESSURE: 90 MMHG | TEMPERATURE: 97.5 F

## 2017-12-24 VITALS — OXYGEN SATURATION: 96 %

## 2017-12-24 VITALS — HEART RATE: 96 BPM

## 2017-12-24 VITALS — HEART RATE: 86 BPM

## 2017-12-24 VITALS — OXYGEN SATURATION: 95 %

## 2017-12-24 LAB
BUN SERPL-MCNC: 7 MG/DL (ref 7–18)
CALCIUM SERPL-MCNC: 8.4 MG/DL (ref 8.5–10.1)
CHLORIDE SERPL-SCNC: 99 MEQ/L (ref 98–107)
CREAT SERPL-MCNC: 0.92 MG/DL (ref 0.5–1)
ERYTHROCYTE [DISTWIDTH] IN BLOOD BY AUTOMATED COUNT: 18.8 % (ref 11.6–17.2)
GFR SERPLBLD BASED ON 1.73 SQ M-ARVRAT: 59 ML/MIN (ref 89–?)
GLUCOSE SERPL-MCNC: 176 MG/DL (ref 74–106)
HCO3 BLD-SCNC: 23.6 MEQ/L (ref 21–32)
HCT VFR BLD CALC: 48.8 % (ref 35–46)
HGB BLD-MCNC: 16.1 GM/DL (ref 11.6–15.3)
MCH RBC QN AUTO: 22.7 PG (ref 27–34)
MCHC RBC AUTO-ENTMCNC: 33 % (ref 32–36)
MCV RBC AUTO: 68.9 FL (ref 80–100)
PLATELET # BLD: 868 TH/MM3 (ref 150–450)
PMV BLD AUTO: 8.9 FL (ref 7–11)
RBC # BLD AUTO: 7.08 MIL/MM3 (ref 4–5.3)
SODIUM SERPL-SCNC: 135 MEQ/L (ref 136–145)
WBC # BLD AUTO: 22.2 TH/MM3 (ref 4–11)

## 2017-12-24 RX ADMIN — Medication SCH ML: at 19:51

## 2017-12-24 RX ADMIN — IPRATROPIUM BROMIDE AND ALBUTEROL SULFATE SCH AMPULE: .5; 3 SOLUTION RESPIRATORY (INHALATION) at 20:22

## 2017-12-24 RX ADMIN — OXYTOCIN SCH MLS/HR: 10 INJECTION, SOLUTION INTRAMUSCULAR; INTRAVENOUS at 16:22

## 2017-12-24 RX ADMIN — STANDARDIZED SENNA CONCENTRATE AND DOCUSATE SODIUM SCH TAB: 8.6; 5 TABLET, FILM COATED ORAL at 09:47

## 2017-12-24 RX ADMIN — STANDARDIZED SENNA CONCENTRATE AND DOCUSATE SODIUM SCH TAB: 8.6; 5 TABLET, FILM COATED ORAL at 19:51

## 2017-12-24 RX ADMIN — SODIUM CHLORIDE SCH MLS/HR: 900 INJECTION INTRAVENOUS at 16:29

## 2017-12-24 RX ADMIN — Medication SCH ML: at 09:48

## 2017-12-24 RX ADMIN — OXYTOCIN SCH MLS/HR: 10 INJECTION, SOLUTION INTRAMUSCULAR; INTRAVENOUS at 19:52

## 2017-12-24 RX ADMIN — CLOPIDOGREL BISULFATE SCH MG: 75 TABLET, FILM COATED ORAL at 09:48

## 2017-12-24 RX ADMIN — HEPARIN SODIUM PRN MLS/HR: 10000 INJECTION, SOLUTION INTRAVENOUS at 09:50

## 2017-12-24 RX ADMIN — METOPROLOL TARTRATE SCH MG: 50 TABLET, FILM COATED ORAL at 19:48

## 2017-12-24 RX ADMIN — DIGOXIN SCH MG: 125 TABLET ORAL at 17:14

## 2017-12-24 RX ADMIN — IPRATROPIUM BROMIDE AND ALBUTEROL SULFATE SCH AMPULE: .5; 3 SOLUTION RESPIRATORY (INHALATION) at 15:16

## 2017-12-24 NOTE — PD.WCN.NOT
Wound Consult


Description:


Consult ordered by Dr.Brenner MERIDA for buttocks


Communicated with:


Amada HERRERA [address] ,


Recommendation:


1) Encourage patient to reposition/offload frequently do not use cloth chucks.


2) Cleanse bilateral buttocks/Tiarra area with warm soap and water,rinse and 

dry.Do not scrub skin


3) Applied Calazime Cream to bilateral buttocks BID or after bowel movement.


Additional Information:


Patient was seen today by writer on [address] ,Amada HERRERA [address] present with 

Writer.Patient alert in bed with no current complaint of discomfort/distress. 

Skin assessment perform Bilateral buttocks pink and blanchable at this time.Two 

areas of scar tissue noted but patient denies any knowledge of previous wounds 

to buttocks.Buttock and tiarra area cleansed with warm water dried Calazime 

applied.Cloth adarsh removed from under patient moisture control pad applied 

under patient.Patient tolerated wound care well.Verbalized understanding of 

repositioning and offloading to prevent skin compromise.Patient will follow up 

with Palliative and Psychiatric care.











Fiona Sanz [facility] Dec 24, 2017 14:43

## 2017-12-24 NOTE — PD.CAR.PN
CVT Progress Note


Subjective/Hospital Course:


Referral received


Full consult to follow


Severe for peripheral vascular disease will require reconstruction


Valentin PEREZ


12/19/17


As noted in my consultation this patient has bilateral severe peripheral 

vascular disease


On top of that she is being treated for urosepsis and general decline


Patient has not been in the hospital for 3 days and has gradually improved for 

her urosepsis based on clinical exam level of alertness and diagnostic studies


Despite heparin drip the right leg is looking worse and worse and patient now 

has ischemia that needs attention in the resolution


In the best of worlds I would like to wait another while to do the surgery 

considering patient's other issues but that this point if surgery is not 

performed patient is a very high risk of losing her leg


I have reviewed patient's echocardiogram and studies and she is at this point 

moderate risk for vascular surgery but I believe options a limited and we have 

to go ahead with it.


Therefore patient will be scheduled for common femoral endarterectomy and 

femoropopliteal bypass tomorrow


I explained the risks and benefits of the procedure and all patient is very 

quiet she is clearly awake and alert and understands the discussion


Have tried to reach her daughter however number is apparently disconnected 


12/2017


Patient originally scheduled for surgery today however EKG reveals some 

anterior ischemia in addition to inferior ischemia that was noted on previous 

EKG


In discussion with the anesthesiologist the decision is made to postpone the 

surgery until patient can be worked up cardiac wise in more detail so we get a 

better picture on cardiac risk and possible remedy


Right leg and foot are ischemic and the cyanotic however while this surgery is 

urgent it is not an emergent and cardiac issues trump any other issue right now


We'll consult cardiology for full evaluation possible stress test


Restart heparin


All things equal once cardiac workup completed we'll proceed with vascular 

bypass and reconstruction on the right leg


12/21/17


Right leg and foot remain ischemic and cyanotic


Discussed with Dr. Pastor


This patient will lose her leg if for no surgery is done in next few days.  On 

the other hand patient has significant coronary artery disease and major other 

comorbidities and hence the high risk


Dr. Pastor we'll proceed with cardiac catheterization through the left leg and 

based on that will decide which way to go


There is of course a good chance the patient will be candidate for any surgery 

and that his then the rendering but we should make every effort to make patient 

at least be able to sustain a limb saving operation.


On the other hand limb saving operation has not much value if patient loses her 

life in the process and therefore every effort should be made to get patient in 

shape good enough to tolerate surgery


12/23/17


Patient status post revascularization of the left leg


Incision is clean and dry


Patient has warm foot with normal capillary refill and strong dopplerable 

popliteal, dorsalis pedis posterior tibial pulses


Right leg is still cyanotic and imminently at risk


We'll give patient another day and address this tomorrow.


Most likely all patient needs is iliofemoral endarterectomy and patch and 

during the surgery we will see if she needs a femoropopliteal bypass in 

addition but I would like to minimize the amount of surgery on this unfortunate 

lady


Transfer to floor today


12/24/17


Patient doing well at this time she is more alert and awake and conversing much 

better than she did in last few days


Left femoral strong palpable pulse strong dopplerable popliteal dissolves pedis 

and posterior tibial pulses on the left


Will give her few days respite between the general anesthesia sessions


Patient is to undergo right femoral endarterectomy and possible bypass on 

Tuesday


After the surgery patient will probably need some nursing home/rehabilitation 

placement because obviously patient cannot go home and take care of herself


Objective:





Vital Signs








  Date Time  Temp Pulse Resp B/P (MAP) Pulse Ox O2 Delivery O2 Flow Rate FiO2


 


12/24/17 08:00 98.2 112 18 174/81 (112) 91   


 


12/23/17 20:00 97.9 107 16 126/70 (88) 91   


 


12/23/17 16:00 98.6 93 18 126/69 (88) 98   


 


12/23/17 12:00  99      


 


12/23/17 12:00 99.2 99 14 143/61 (88) 98   


 


12/23/17 10:00  101      








Labs:





Laboratory Tests








Test


  12/23/17


23:28 12/24/17


07:07


 


Activated Partial


Thromboplast Time 54.8 SEC


(24.3-30.1) 51.8 SEC


(24.3-30.1)


 


White Blood Count


  


  22.2 TH/MM3


(4.0-11.0)


 


Red Blood Count


  


  7.08 MIL/MM3


(4.00-5.30)


 


Hemoglobin


  


  16.1 GM/DL


(11.6-15.3)


 


Hematocrit


  


  48.8 %


(35.0-46.0)


 


Mean Corpuscular Volume


  


  68.9 FL


(80.0-100.0)


 


Mean Corpuscular Hemoglobin


  


  22.7 PG


(27.0-34.0)


 


Mean Corpuscular Hemoglobin


Concent 


  33.0 %


(32.0-36.0)


 


Red Cell Distribution Width


  


  18.8 %


(11.6-17.2)


 


Platelet Count


  


  868 TH/MM3


(150-450)


 


Mean Platelet Volume


  


  8.9 FL


(7.0-11.0)








Result Diagram:  


12/24/17 0707                                                                  

              12/22/17 1726








(1) Peripheral vascular disease


(2) CAD (coronary artery disease)


(3) Cardiomyopathy











Amber Acosta MD Dec 24, 2017 09:48

## 2017-12-24 NOTE — PD.CARD.PN
Subjective


Subjective Remarks


A fib with RVR, severe hypertension, c/o SOB, no CP





Objective


Medications





Current Medications








 Medications


  (Trade)  Dose


 Ordered  Sig/Carlitos


 Route  Start Time


 Stop Time Status Last Admin


 


 Ceftriaxone


 Sodium 1000 mg/


 Sodium Chloride  100 ml @ 


 200 mls/hr  Q24H


 IV  12/17/17 16:00


    12/24/17 16:29


 


 


  (NS Flush)  2 ml  UNSCH  PRN


 IV FLUSH  12/17/17 16:00


     


 


 


  (NS Flush)  2 ml  BID


 IV FLUSH  12/17/17 21:00


    12/24/17 09:48


 


 


  (Tylenol)  650 mg  Q4H  PRN


 PO  12/17/17 16:00


     


 


 


  (Zofran Inj)  4 mg  Q6H  PRN


 IVP  12/17/17 16:00


     


 


 


  (Restoril)  15 mg  HS  PRN


 PO  12/17/17 16:00


     


 


 


  (Narcan Inj)  0.4 mg  UNSCH  PRN


 IV PUSH  12/17/17 16:00


     


 


 


  (Tiarra-Colace)  1 tab  BID


 PO  12/17/17 21:00


    12/24/17 09:47


 


 


  (Milk Of


 Magnesia Liq)  30 ml  Q12H  PRN


 PO  12/17/17 16:00


     


 


 


  (Senokot)  17.2 mg  Q12H  PRN


 PO  12/17/17 16:00


     


 


 


  (Dulcolax Supp)  10 mg  DAILY  PRN


 RECTAL  12/17/17 16:00


     


 


 


  (Lactulose Liq)  30 ml  DAILY  PRN


 PO  12/17/17 16:00


     


 


 


  (Duoneb Neb)  1 ampule  Q4HR NEB  PRN


 NEB  12/18/17 04:00


     


 


 


 Heparin Sodium/


 Dextrose  250 ml @ 


 10 mls/hr  TITRATE  PRN


 IV  12/18/17 06:30


    12/24/17 09:50


 


 


 Lactated Ringer's  1,000 ml @ 


 40 mls/hr  Q24H


 IV  12/20/17 13:00


    12/23/17 03:30


 


 


 Lactated Ringer's  1,000 ml @ 


 30 mls/hr  Q24H PRN


 IV  12/22/17 02:00


 12/25/17 01:59   


 


 


 Sodium Chloride  500 ml @ 


 30 mls/hr  [id number] PRN


 IV  12/22/17 02:00


 12/25/17 01:59   


 


 


  (Betadine 5%


 Antisepsis Kit)  1 applic  ON CALL  PRN


 EACH NARE  12/22/17 02:00


 12/25/17 01:59   


 


 


  (Chlorhexidine


 2% Cloth)  3 pack  ON CALL  PRN


 TOPICAL  12/22/17 02:00


 12/25/17 01:59   


 


 


  (Plavix)  75 mg  DAILY


 PO  12/22/17 17:15


    12/24/17 09:48


 


 


  (Lopressor)  25 mg  Q12HR


 PO  12/24/17 11:30


    12/24/17 14:46


 


 


  (Duoneb Neb)  1 ampule  QID  NEB


 NEB  12/24/17 16:00


    12/24/17 15:16


 








Vital Signs / I&O





Vital Signs








  Date Time  Temp Pulse Resp B/P (MAP) Pulse Ox O2 Delivery O2 Flow Rate FiO2


 


12/24/17 15:26     95 Venturi Mask 3.00 31


 


12/24/17 15:00  165 26 193/114 (140) 92   


 


12/24/17 12:00 97.5 119 20 179/99 (125) 91   


 


12/24/17 08:00 98.2 112 18 174/81 (112) 91   


 


12/23/17 20:00 97.9 107 16 126/70 (88) 91   














I/O      


 


 12/23/17 12/23/17 12/23/17 12/24/17 12/24/17 12/24/17





 07:00 15:00 23:00 07:00 15:00 23:00


 


Intake Total 320 ml 235 ml 360 ml 240 ml  


 


Output Total 400 ml  400 ml 650 ml  


 


Balance -80 ml 235 ml -40 ml -410 ml  


 


      


 


Intake Oral 320 ml  360 ml 240 ml  


 


IV Total  235 ml    


 


Output Urine Total 400 ml  400 ml 650 ml  


 


# Bowel Movements 0     








Physical Exam


GENERAL: In mod resp distress


SKIN: Warm and dry.


HEAD: Normocephalic.


EYES: No scleral icterus. No injection or drainage. 


NECK: Supple, trachea midline. No JVD or lymphadenopathy.


CARDIOVASCULAR: Irregular rate and rhythm, tachycardic, without murmurs, gallops

, or rubs. 


RESPIRATORY: Breath sounds equal bilaterally. Few rhonchi.


GASTROINTESTINAL: Abdomen soft, non-tender, nondistended. 


MUSCULOSKELETAL: No cyanosis, or edema.





Laboratory





Laboratory Tests








Test


  12/23/17


23:28 12/24/17


07:07


 


Activated Partial


Thromboplast Time 54.8 SEC 


  51.8 SEC 


 


 


White Blood Count  22.2 TH/MM3 


 


Red Blood Count  7.08 MIL/MM3 


 


Hemoglobin  16.1 GM/DL 


 


Hematocrit  48.8 % 


 


Mean Corpuscular Volume  68.9 FL 


 


Mean Corpuscular Hemoglobin  22.7 PG 


 


Mean Corpuscular Hemoglobin


Concent 


  33.0 % 


 


 


Red Cell Distribution Width  18.8 % 


 


Platelet Count  868 TH/MM3 


 


Mean Platelet Volume  8.9 FL 








Imaging





Last 24 hours Impressions








Chest X-Ray 12/24/17 0000 Signed





Impressions: 





 Service Date/Time:  Sunday, December 24, 2017 15:11 - CONCLUSION:  Increasing 





 hazy opacity at right lung base indicating either parenchymal consolidation or 





 small pleural effusion. There is also mild increase in bilateral diffuse 





 interstitial opacity suggesting pulmonary edema.     Ebenezer Mariscal MD 











Assessment and Plan


Problem List:  


(1) Peripheral vascular disease


ICD Codes:  I73.9 - Peripheral vascular disease, unspecified


Status:  Acute


(2) CAD (coronary artery disease)


ICD Codes:  I25.10 - Atherosclerotic heart disease of native coronary artery 

without angina pectoris


(3) Cardiomyopathy


ICD Codes:  I42.9 - Cardiomyopathy, unspecified


(4) Atrial fibrillation


ICD Codes:  I48.91 - Unspecified atrial fibrillation


(5) Hypertension


ICD Codes:  I10 - Essential (primary) hypertension


Assessment and Plan


AF w RVR, hypertension. Start dilt IV and drip for rate control. Transfer back 

to ICU. RLE revascularization later to prevent limb loss.











Gonzalo Gerard MD Dec 24, 2017 16:53

## 2017-12-24 NOTE — MA
cc:

NICHOLAS CORDOVA MD

****

 

 

DATE: 12/22/2017

 

INDICATIONS

Abnormal nuclear myocardial perfusion study, coronary disease, peripheral 
vascular disease.

 

PROCEDURE PERFORMED

1. Retrograde left heart catheterization with left ventriculography and

     selective coronary angiography.

2. Moderate sedation.

 

ACCESS SITE

Left femoral artery.

 

EQUIPMENT USED

5-Burkinan pigtail catheter.  JL-4 and AR modified coronary artery catheters.

 

MEDICATIONS

Versed IV

Fentanyl IV.

 

CONTRAST

Omnipaque 60 cc

 

COMPLICATIONS

None.

 

BLOOD LOSS

Less than 10 cc.

 

METHOD OF HEMOSTASIS

Manual compression.

 

HEMODYNAMICS

Heart rate 90 beats per minute.

Left ventricular end-diastolic pressure 18 mmHg.

Left ventricle 153/18.

Aorta 153/77/111.

 

LEFT VENTRICULOGRAPHY

Left ventricular ejection fraction 35%.

Wall motion: Anteroapical hypokinesis. No mitral regurgitation.

 

CORONARY ANGIOGRAPHY

The left main coronary artery is patent.

 

The left anterior descending artery is patent in the proximal portion, with 50%

stenosis in the mid portion.  D1 patent.

 

Left circumflex artery patent.  OM1 patent.

 

Right coronary is a dominant vessel with total occlusion in the proximal

portion.  The distal vessel is filling by abundant left-to-right collaterals.

 

DIAGNOSIS

1. Coronary artery disease with total occlusion of the right coronary artery

     with distal vessel filling by abundant collaterals.

2. Moderate left ventricular dysfunction consistent with ischemic

     cardiomyopathy.

 

DISPOSITION

Ms. Leonard was found to have evidence of coronary artery disease and moderate

left ventricular dysfunction.  Her risk of surgery is increased but not

prohibitive.  I recommend to proceed with her vascular surgery as planned.

 

 

 

                              _________________________________

                              MD LEXII Gonsalves/MINERVA

D:  12/22/2017/10:24 AM

T:  12/24/2017/6:24 AM

Visit #:  [id number]

Job #:  [id number]

[email or web address]

## 2017-12-24 NOTE — RADRPT
EXAM DATE/TIME:  12/24/2017 15:11 

 

[facility] COMPARISON:     

CHEST SINGLE AP, December 18, 2017, 2:34.

 

                     

INDICATIONS :     

Shortness of breath- Tachycardia.

                     

 

MEDICAL HISTORY :     

Stroke.  Hypertension        

 

SURGICAL HISTORY :        

Tonsillectomy. Tubal ligation.

 

ENCOUNTER:     

Subsequent                                        

 

ACUITY:     

1 day      

 

PAIN SCORE:     

Non-responsive.

 

LOCATION:     

Bilateral chest 

 

FINDINGS:     

Single AP view of the chest. Increasing hazy opacity at the right lung base. Mild diffuse interstitia
l opacity also slightly increased. Cardiomediastinal silhouette unchanged. No evidence of pneumothora
x.

 

CONCLUSION:     

Increasing hazy opacity at right lung base indicating either parenchymal consolidation or small pleur
al effusion. There is also mild increase in bilateral diffuse interstitial opacity suggesting pulmona
ry edema.

 

 

 

 Ebenezer Mariscal MD on December 24, 2017 at 15:47           

Board Certified Radiologist.

 This report was verified electronically.

## 2017-12-24 NOTE — HHI.PR
Subjective


Remarks


Resting in bed


Afebrile overnight





Objective


Vitals





Vital Signs








  Date Time  Temp Pulse Resp B/P (MAP) Pulse Ox O2 Delivery O2 Flow Rate FiO2


 


12/24/17 12:00 97.5 119 20 179/99 (125) 91   


 


12/24/17 08:00 98.2 112 18 174/81 (112) 91   


 


12/23/17 20:00 97.9 107 16 126/70 (88) 91   


 


12/23/17 16:00 98.6 93 18 126/69 (88) 98   














I/O      


 


 12/23/17 12/23/17 12/23/17 12/24/17 12/24/17 12/24/17





 07:00 15:00 23:00 07:00 15:00 23:00


 


Intake Total 320 ml 235 ml 360 ml 240 ml  


 


Output Total 400 ml  400 ml 650 ml  


 


Balance -80 ml 235 ml -40 ml -410 ml  


 


      


 


Intake Oral 320 ml  360 ml 240 ml  


 


IV Total  235 ml    


 


Output Urine Total 400 ml  400 ml 650 ml  


 


# Bowel Movements 0     








Result Diagram:  


12/24/17 0707                                                                  

              12/22/17 1726





Objective Remarks


GENERAL: This is a well-nourished, well-developed patient, in no apparent 

distress.


CARDIOVASCULAR: Regular rate and rhythm without murmurs, gallops, or rubs. 


RESPIRATORY: Clear to auscultation. Breath sounds equal bilaterally. No wheezes

, rales, or rhonchi.  


GASTROINTESTINAL: Abdomen soft, non-tender, nondistended. Normal active bowel 

sounds


MUSCULOSKELETAL: Extremities without clubbing, cyanosis, or edema.


NEURO:  Alert & Oriented x4 to person, place, time, situation.  Moves all ext x4


Procedures











KASHIF THAPA











 **Signed**





 EXAM DATE/TIME:  12/17/2017 17:47 


 


[facility] COMPARISON:     


No previous studies available for comparison.


 


 


INDICATIONS :     


Right leg pain; evaluate for occlusion.


                      


 


IV CONTRAST:     


100 cc Omnipaque 350 (iohexol) IV 


 


 


RADIATION DOSE:     


2.3 CTDIvol (mGy) 


 


 


MEDICAL HISTORY :     


Cerebrovascular disease.  


 


SURGICAL HISTORY :      


Tubal ligation. 


 


ENCOUNTER:     


Initial


 


ACUITY:     


1 month


 


PAIN SCALE:     


7/10


 


LOCATION:      


Right lower leg


 


TECHNIQUE:     


Volumetric scanning was performed using a multi-row detector CT scanner.  The 

data was post processed with a variety of visualization algorithms including 

full volume maximum intensity projection, multi-planar sliding thin slab 

reformation, curved planar reformation, and surface rendering techniques.  

Using automated exposure control and adjustment of the mA and/or kV according 

to patient size, radiation dose was kept as low as reasonably achievable to 

obtain optimal diagnostic quality images.   DICOM format image data is 

available electronically for review and comparison.  


 


FINDINGS:     


There are atherosclerotic changes seen throughout the arterial system.  The 

abdominal aorta measures up to 2.9 cm.  At the distal infrarenal abdominal 

aorta thrombus occupies more than half of the lumen.  The functional lumen is 

seen at the left lateral aspect of the distal abdominal aorta.  There is 

atherosclerotic change at the common iliac arteries bilaterally.  A significant 

stenosis is not seen at this level is not seen.  There does appear to be severe 

stenosis and possible occlusion at the origin of the right internal iliac 

artery.  There is mild plaque seen at the right external iliac artery.  There 

is a severe stenosis at the distal left external iliac artery.


 


There is atherosclerotic change seen at the common femoral arteries bilaterally 

being more severe on the right.  There is a severe stenosis at the right common 

femoral artery narrowing the lumen by approximately 80%.  There is severe 

narrowing of the proximal right profunda femoris artery.  There is abrupt 

occlusion of the proximal right superficial femoral artery.  Flow in the thigh 

is continues through collaterals in the profunda femoris artery distribution.  

There is reconstitution of the distal popliteal artery at the level of the 

distal femur via collaterals.  There is a normal trifurcation.  The right 

anterior tibial artery can be traced to the ankle.  The posterior tibial and 

peritoneal arteries can be seen to the distal lower leg but can not clearly be 

traced into the foot.  


 


Again noted is the mild stenosis at the left common femoral artery.  There is 

occlusion of the proximal left superficial femoral artery.  The popliteal 

artery is patent throughout.  There is reconstitution of the distal superficial 

femoral artery at the level of the distal femoral shaft.  The popliteal artery 

is patent.  There is a severe stenosis at the mid popliteal artery.  The lumen 

is narrowed by over 75%.  The more distal popliteal artery is patent.  The 

anterior and posterior tibial artery could be traced to the foot.  The 

peritoneal artery can be traced to the ankle region.  


 


Atherosclerotic calcifications are seen throughout the origins of the vessels 

in the upper abdomen.  There does appear to be at least a moderate stenosis at 

the origin of the right renal artery.  The left renal artery appears patent.  

The celiac, SMA and DMITRI are patent.  


 


There is a mild hiatal hernia.  The liver, spleen, pancreas and kidneys appear 

grossly normal.  The adrenal glands are grossly normal.  The pelvic structures 

appear intact.  The patient does have a Arevalo catheter in the urinary bladder.  

There is degenerative change in the lumbar spine.  


 


CONCLUSION:


1. Atherosclerotic changes seen throughout the arterial system, including 

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 

mural thrombus.


2. Atherosclerotic change in the external iliac and common femoral arteries 

bilaterally as described above. 


3. Occlusion of the superficial femoral arteries bilaterally with 

reconstitution distally.  The reconstitution is higher on the left side. 


4. Normal trifurcation vessels seen on the left side.


5. Diminished flow seen at the right trifurcation vessels.  The vessels can 

only be well seen on the delayed images.  The vessels can not be traced into 

the foot.      


 


 


 


 Emir Sorensen MD on December 17, 2017 at 19:28                


Board Certified Radiologist.


 This report was verified electronically.   














On 12/22 by Dr. PEREZ :


Exploration of the left groin, left common femoral and external iliac artery.


Endarterectomy and patch angioplasty.  External and common artery


thromboembolectomy, superficial femoral popliteal artery thromboembolectomy.


Arteriogram.





A/P


Assessment and Plan


12/23: Patient status post revascularization, further intervention per [facility]


12/24: Patient feels worse today, short of breath with some chest tightness, 

she is tachycardic in 1:30 to 140, hypertensive 210/111, will order stat chest x

-ray, EKG, earlier I ordered Lopressor 25 mg to improve blood pressure and 

heart rate, stress nuclear testing showed suspected RCA infarct, cardiovascular 

surgery planning on possible right femoral endarterectomy on Tuesday.


Discussed with cardiology Dr. mccarty, will transfer to ICU





A/P:


Peripheral arterial vascular disease with Bilateral LE  discoloration and 

unable to feel DP pulses. Patient with pain and cold extremities.  Status post 

CTA Ao run off, vascular surgery  Dr. SANTOS FOLLOWING FOR femoropopliteal 

bypass





Started on antibiotic Rocephin IV . Received vanco and zosyn in the ED. 

Received bolus of NS in the eD. Continue IVF. Monitor VS closely. 


Urine cultures shows Escherichia coli UTI sensitive to Rocephin  





Restart home meds as appropriate patient says she currently doesn't take any 

meds. 








Consult PT for eval CONSULT OT





Consult case management for DC plan 





DVT ppx lovenox





History of cerebrovascular accident 





Gait instability possibly secondary to cerebrovascular accident versus 

peripheral vascular occlusive disease





Deconditioning Will need SNF and PT and OT











Marcus Childers MD Dec 24, 2017 15:17

## 2017-12-24 NOTE — EKG
Date Performed: 12/24/2017       Time Performed: 15:09:04

 

PTAGE:      77 years

 

EKG:      POSSIBLE SINUS TACHYCARDIA WITH OCCASIONAL PAC RIGHT AXIS DEVIATION SEPTAL MYOCARDIAL INFAR
CTION , OF INDETERMINATE AGE ST DEVIATION AND MODERATE T-WAVE ABNORMALITY, CONSIDER INFERIOR ISCHEMIA
 ABNORMAL ECG

 

PREVIOUS TRACING       : 12/18/2017 13.32 Compared to previous tracing, heart rate has increased.

 

DOCTOR:   Michel Cerrato  Interpretating Date/Time  12/24/2017 15:53:00

## 2017-12-24 NOTE — MP
cc:

MD VIJAY,[facility]

****

 

 

DATE OF SURGERY: 12/22/2017

 

PREOPERATIVE DIAGNOSIS:

Severe peripheral vascular disease, both legs, status post cardiac

catheterization to the left groin and acute occlusion of blood flow to the left

leg.

 

POSTOPERATIVE DIAGNOSIS:

1. Severe peripheral vascular disease, both legs, status post cardiac

catheterization to the left groin and acute occlusion of blood flow to the left

leg.

2. Near occlusion of the common femoral and external iliac arteries.

3. Thrombosis of the external iliac arteries, superficial femoral, and

popliteal arteries.

 

OPERATIVE PROCEDURE

Exploration of the left groin, left common femoral and external iliac artery.

Endarterectomy and patch angioplasty.  External and common artery

thromboembolectomy, superficial femoral popliteal artery thromboembolectomy.

Arteriogram.

 

SURGEON

Dr. Acosta.

 

ANESTHESIA

General.

 

ESTIMATED BLOOD LOSS:

300 cc.

 

DESCRIPTION OF PROCEDURE:

The patient was prepped and draped in the usual fashion.  Left groin incision

made, deepened down to the level of the  neurovascular bundle, common femoral,

deep and superficial femoral arteries isolated.  None of them have any pulse in

it.  Barrientos retractor placed under inguinal ligament and inguinal ligament

elevated, exposing the most distal portion, external iliac artery going over

the pubic bone.  Once this was done, there was an area where there is very weak

pulse.  Attempt was made to place a micro needle and micro wire, and then a

sheath, which initially works.  The glide wire is placed and arteriogram is

obtained.  The patient has diffuse clot throughout the vessel and then a fairly

tight narrowing of the external iliac artery about three inches above the

inguinal ligament.  Because of the amount of clot in the vessel, it is

impossible to put a stent or balloon.  Decision was made to do this open.

 

The vessels are isolated with vessel loops placed around them and then common

femoral artery opened into the external iliac artery with [place]el[place] scissors.

First a #4 Nelda is placed distally into deep femoral artery and a large clot

retrieved.  At this point the femoral artery bleeds nicely back.  Now the

Nelda is placed in superficial femoral artery and low and behold it goes all

the way down into probably the posterior tibial artery half way cathed and then

stops.  This one is now retrieved and a huge amount of thromboembolic material

is retrieved and in addition, a white thrombus which is probably the guide for

all this occlusion.

 

The patient must have been clotting this off for a long time slowly and finally

came to a grinding halt as the cardiac catheterization was done.  Heparinized

saline is flushed down the vessels and the profunda clamp applied.

 

The Nelda is now placed proximally and all the way down up to the aorta and

then withdrawn.  There is a narrow area above-noted external iliac artery, here

the Nelda balloon had to be deflated and again inflated lower down, and that

resulted in retrieval of a huge amount of thromboembolic material and suddenly

a very brisk rush of blood down.

 

Decision was now made to do balloon angioplasty considering the vessel is open

and the end of the sheath of the 6 [place] sheath is just at the location of the

stenosis.  It would be hard to pull it back because there is not much vessel

left distally.  Decision is made to leave this alone considering that there is

a brisk flow down and perhaps address it when the right leg is reconstructed a

few days from now and go up and over.

 

Satinsky clamp is applied proximally and using [place] dissector, in the medial 
plane 

a huge plaque and external iliac and common femoral arteries is dissected and 
removed.  

Remaining surfaces is cleaned of any debris with heparinized saline and felt 
tips. 

Now an 8 millimeter x 8 centimeter bovine patch grafted used and sewn in with 
running 

6-0 Prolene.  Prior to completing the patch angioplasty, all vessels are 
flushed in usual 

fashion and angioplasty is completed and blood flow re-established.

 

At this point the patient has bounding pulse in the femoral arteries and strong

brisk dopplerable pulse distally.  The left foot readily warms up and pinks up.

The area is irrigated with copious amounts of saline and then closed in layers

using 2-0 Vicryl and 4-0 Monocryl, benzoin and Steri-Strips applied.

 

The patient taken from the operating room in stable condition and will be taken

back to the OR a few days from now to address the right leg ischemia which was

the initial problem and why the patient came to the hospital.

 

 

                              _________________________________

                              Amber ARIAS

D:  12/22/2017/4:52 PM

T:  12/24/2017/8:45 AM

Visit #:  [id number]

Job #:  [id number]

CATHERINE

## 2017-12-25 VITALS — HEART RATE: 100 BPM

## 2017-12-25 VITALS — HEART RATE: 87 BPM

## 2017-12-25 VITALS
DIASTOLIC BLOOD PRESSURE: 60 MMHG | TEMPERATURE: 98 F | SYSTOLIC BLOOD PRESSURE: 101 MMHG | RESPIRATION RATE: 12 BRPM | HEART RATE: 98 BPM | OXYGEN SATURATION: 92 %

## 2017-12-25 VITALS — HEART RATE: 86 BPM

## 2017-12-25 VITALS
HEART RATE: 90 BPM | RESPIRATION RATE: 16 BRPM | SYSTOLIC BLOOD PRESSURE: 102 MMHG | TEMPERATURE: 98.1 F | OXYGEN SATURATION: 94 % | DIASTOLIC BLOOD PRESSURE: 57 MMHG

## 2017-12-25 VITALS
HEART RATE: 95 BPM | TEMPERATURE: 98.2 F | RESPIRATION RATE: 20 BRPM | DIASTOLIC BLOOD PRESSURE: 65 MMHG | SYSTOLIC BLOOD PRESSURE: 117 MMHG | OXYGEN SATURATION: 97 %

## 2017-12-25 VITALS
HEART RATE: 112 BPM | TEMPERATURE: 98.6 F | RESPIRATION RATE: 23 BRPM | DIASTOLIC BLOOD PRESSURE: 62 MMHG | SYSTOLIC BLOOD PRESSURE: 107 MMHG | OXYGEN SATURATION: 94 %

## 2017-12-25 VITALS
TEMPERATURE: 98 F | RESPIRATION RATE: 26 BRPM | OXYGEN SATURATION: 99 % | DIASTOLIC BLOOD PRESSURE: 58 MMHG | SYSTOLIC BLOOD PRESSURE: 102 MMHG | HEART RATE: 90 BPM

## 2017-12-25 VITALS
HEART RATE: 100 BPM | OXYGEN SATURATION: 90 % | DIASTOLIC BLOOD PRESSURE: 58 MMHG | TEMPERATURE: 97.6 F | SYSTOLIC BLOOD PRESSURE: 118 MMHG | RESPIRATION RATE: 17 BRPM

## 2017-12-25 VITALS — OXYGEN SATURATION: 94 %

## 2017-12-25 VITALS — HEART RATE: 92 BPM

## 2017-12-25 VITALS — HEART RATE: 81 BPM

## 2017-12-25 VITALS — HEART RATE: 93 BPM

## 2017-12-25 VITALS — HEART RATE: 95 BPM

## 2017-12-25 VITALS — OXYGEN SATURATION: 95 %

## 2017-12-25 VITALS — HEART RATE: 105 BPM

## 2017-12-25 LAB
BACTERIA #/AREA URNS HPF: (no result) /HPF
BASOPHILS # BLD AUTO: 0.1 TH/MM3 (ref 0–0.2)
BASOPHILS NFR BLD AUTO: 1 % (ref 0–2)
BASOPHILS NFR BLD: 0.3 % (ref 0–2)
BUN SERPL-MCNC: 8 MG/DL (ref 7–18)
CALCIUM SERPL-MCNC: 8.5 MG/DL (ref 8.5–10.1)
CHLORIDE SERPL-SCNC: 99 MEQ/L (ref 98–107)
COLOR UR: YELLOW
CREAT SERPL-MCNC: 0.67 MG/DL (ref 0.5–1)
EOSINOPHIL # BLD: 0.3 TH/MM3 (ref 0–0.4)
EOSINOPHIL NFR BLD: 1 % (ref 0–4)
ERYTHROCYTE [DISTWIDTH] IN BLOOD BY AUTOMATED COUNT: 19 % (ref 11.6–17.2)
GFR SERPLBLD BASED ON 1.73 SQ M-ARVRAT: 86 ML/MIN (ref 89–?)
GLUCOSE SERPL-MCNC: 107 MG/DL (ref 74–106)
GLUCOSE UR STRIP-MCNC: (no result) MG/DL
HCO3 BLD-SCNC: 25.2 MEQ/L (ref 21–32)
HCT VFR BLD CALC: 47.9 % (ref 35–46)
HGB BLD-MCNC: 15.5 GM/DL (ref 11.6–15.3)
HGB UR QL STRIP: (no result)
KETONES UR STRIP-MCNC: (no result) MG/DL
LYMPHOCYTES # BLD AUTO: 0.8 TH/MM3 (ref 1–4.8)
LYMPHOCYTES NFR BLD AUTO: 3 % (ref 9–44)
LYMPHOCYTES: 1 % (ref 9–44)
MCH RBC QN AUTO: 22 PG (ref 27–34)
MCHC RBC AUTO-ENTMCNC: 32.3 % (ref 32–36)
MCV RBC AUTO: 68.1 FL (ref 80–100)
MONOCYTE #: 1.1 TH/MM3 (ref 0–0.9)
MONOCYTES NFR BLD: 4.3 % (ref 0–8)
MONOCYTES: 2 % (ref 0–8)
MUCOUS THREADS #/AREA URNS LPF: (no result) /LPF
NEUTROPHILS # BLD AUTO: 24.5 TH/MM3 (ref 1.8–7.7)
NEUTROPHILS NFR BLD AUTO: 91.4 % (ref 16–70)
NEUTS BAND # BLD MANUAL: 25.7 TH/MM3 (ref 1.8–7.7)
NEUTS BAND NFR BLD: 11 % (ref 0–6)
NEUTS SEG NFR BLD MANUAL: 85 % (ref 16–70)
NITRITE UR QL STRIP: (no result)
OVALOCYTES BLD QL SMEAR: (no result)
PLATELET # BLD: 956 TH/MM3 (ref 150–450)
PMV BLD AUTO: 8.7 FL (ref 7–11)
RBC # BLD AUTO: 7.04 MIL/MM3 (ref 4–5.3)
SODIUM SERPL-SCNC: 137 MEQ/L (ref 136–145)
SP GR UR STRIP: 1.01 (ref 1–1.03)
SQUAMOUS #/AREA URNS HPF: <1 /HPF (ref 0–5)
TRANS CELLS #/AREA URNS HPF: <1 /HPF
URINE LEUKOCYTE ESTERASE: (no result)
WBC # BLD AUTO: 26.8 TH/MM3 (ref 4–11)
WBC TOXIC VACUOLES BLD QL SMEAR: PRESENT
WHITE BLOOD CELL CLUMPS: (no result)

## 2017-12-25 RX ADMIN — METOPROLOL TARTRATE SCH MG: 50 TABLET, FILM COATED ORAL at 08:33

## 2017-12-25 RX ADMIN — CLOPIDOGREL BISULFATE SCH MG: 75 TABLET, FILM COATED ORAL at 08:33

## 2017-12-25 RX ADMIN — TAZOBACTAM SODIUM AND PIPERACILLIN SODIUM SCH MLS/HR: 500; 4 INJECTION, SOLUTION INTRAVENOUS at 21:29

## 2017-12-25 RX ADMIN — IPRATROPIUM BROMIDE AND ALBUTEROL SULFATE SCH AMPULE: .5; 3 SOLUTION RESPIRATORY (INHALATION) at 11:23

## 2017-12-25 RX ADMIN — Medication SCH ML: at 08:33

## 2017-12-25 RX ADMIN — IPRATROPIUM BROMIDE AND ALBUTEROL SULFATE SCH AMPULE: .5; 3 SOLUTION RESPIRATORY (INHALATION) at 16:04

## 2017-12-25 RX ADMIN — TAZOBACTAM SODIUM AND PIPERACILLIN SODIUM SCH MLS/HR: 500; 4 INJECTION, SOLUTION INTRAVENOUS at 14:37

## 2017-12-25 RX ADMIN — STANDARDIZED SENNA CONCENTRATE AND DOCUSATE SODIUM SCH TAB: 8.6; 5 TABLET, FILM COATED ORAL at 08:33

## 2017-12-25 RX ADMIN — Medication SCH ML: at 21:00

## 2017-12-25 RX ADMIN — HEPARIN SODIUM PRN MLS/HR: 10000 INJECTION, SOLUTION INTRAVENOUS at 10:30

## 2017-12-25 RX ADMIN — SODIUM CHLORIDE SCH MLS/HR: 900 INJECTION INTRAVENOUS at 16:56

## 2017-12-25 RX ADMIN — METOPROLOL TARTRATE SCH MG: 25 TABLET, FILM COATED ORAL at 21:29

## 2017-12-25 RX ADMIN — IPRATROPIUM BROMIDE AND ALBUTEROL SULFATE SCH AMPULE: .5; 3 SOLUTION RESPIRATORY (INHALATION) at 19:55

## 2017-12-25 RX ADMIN — STANDARDIZED SENNA CONCENTRATE AND DOCUSATE SODIUM SCH TAB: 8.6; 5 TABLET, FILM COATED ORAL at 21:29

## 2017-12-25 RX ADMIN — DIGOXIN SCH MG: 125 TABLET ORAL at 08:33

## 2017-12-25 RX ADMIN — IPRATROPIUM BROMIDE AND ALBUTEROL SULFATE SCH AMPULE: .5; 3 SOLUTION RESPIRATORY (INHALATION) at 07:41

## 2017-12-25 NOTE — PD.CAR.PN
CVT Progress Note


Subjective/Hospital Course:


Referral received


Full consult to follow


Severe for peripheral vascular disease will require reconstruction


Valentin PEREZ


12/19/17


As noted in my consultation this patient has bilateral severe peripheral 

vascular disease


On top of that she is being treated for urosepsis and general decline


Patient has not been in the hospital for 3 days and has gradually improved for 

her urosepsis based on clinical exam level of alertness and diagnostic studies


Despite heparin drip the right leg is looking worse and worse and patient now 

has ischemia that needs attention in the resolution


In the best of worlds I would like to wait another while to do the surgery 

considering patient's other issues but that this point if surgery is not 

performed patient is a very high risk of losing her leg


I have reviewed patient's echocardiogram and studies and she is at this point 

moderate risk for vascular surgery but I believe options a limited and we have 

to go ahead with it.


Therefore patient will be scheduled for common femoral endarterectomy and 

femoropopliteal bypass tomorrow


I explained the risks and benefits of the procedure and all patient is very 

quiet she is clearly awake and alert and understands the discussion


Have tried to reach her daughter however number is apparently disconnected 


12/2017


Patient originally scheduled for surgery today however EKG reveals some 

anterior ischemia in addition to inferior ischemia that was noted on previous 

EKG


In discussion with the anesthesiologist the decision is made to postpone the 

surgery until patient can be worked up cardiac wise in more detail so we get a 

better picture on cardiac risk and possible remedy


Right leg and foot are ischemic and the cyanotic however while this surgery is 

urgent it is not an emergent and cardiac issues trump any other issue right now


We'll consult cardiology for full evaluation possible stress test


Restart heparin


All things equal once cardiac workup completed we'll proceed with vascular 

bypass and reconstruction on the right leg


12/21/17


Right leg and foot remain ischemic and cyanotic


Discussed with Dr. Pastor


This patient will lose her leg if for no surgery is done in next few days.  On 

the other hand patient has significant coronary artery disease and major other 

comorbidities and hence the high risk


Dr. Pastor we'll proceed with cardiac catheterization through the left leg and 

based on that will decide which way to go


There is of course a good chance the patient will be candidate for any surgery 

and that his then the rendering but we should make every effort to make patient 

at least be able to sustain a limb saving operation.


On the other hand limb saving operation has not much value if patient loses her 

life in the process and therefore every effort should be made to get patient in 

shape good enough to tolerate surgery


12/23/17


Patient status post revascularization of the left leg


Incision is clean and dry


Patient has warm foot with normal capillary refill and strong dopplerable 

popliteal, dorsalis pedis posterior tibial pulses


Right leg is still cyanotic and imminently at risk


We'll give patient another day and address this tomorrow.


Most likely all patient needs is iliofemoral endarterectomy and patch and 

during the surgery we will see if she needs a femoropopliteal bypass in 

addition but I would like to minimize the amount of surgery on this unfortunate 

lady


Transfer to floor today


12/24/17


Patient doing well at this time she is more alert and awake and conversing much 

better than she did in last few days


Left femoral strong palpable pulse strong dopplerable popliteal dissolves pedis 

and posterior tibial pulses on the left


Will give her few days respite between the general anesthesia sessions


Patient is to undergo right femoral endarterectomy and possible bypass on 

Tuesday


After the surgery patient will probably need some nursing home/rehabilitation 

placement because obviously patient cannot go home and take care of herself


12/25/17


Patient doing okay at this time


She is more alert and awake and answers questions appropriately and matter-of-

fact asking questions about her legs


Left leg is well-perfused incision is clean and dry


Right leg on the other hand is cyanotic and purple as yesterday


For right iliofemoral endarterectomy and possible femoropopliteal bypass 

tomorrow


This lady is fairly significant risk patient however in absence of surgery she 

will lose right leg and chance of this is 100%


Objective:





Vital Signs








  Date Time  Temp Pulse Resp B/P (MAP) Pulse Ox O2 Delivery O2 Flow Rate FiO2


 


12/25/17 10:00  105      


 


12/25/17 08:00  100      


 


12/25/17 08:00 97.6 100 17 118/58 (78) 90   


 


12/25/17 07:42     94 Venturi Mask  50


 


12/25/17 07:00  100      


 


12/25/17 07:00     97 Venturi Mask 6.00 40


 


12/25/17 06:00  87      


 


12/25/17 04:00 98.1 90 16 102/57 (72) 94   


 


12/25/17 04:00  90      


 


12/25/17 02:00  86      


 


12/25/17 00:00  98      


 


12/25/17 00:00 98.0 98 12 101/60 (74) 92   


 


12/24/17 23:00  96      


 


12/24/17 22:00  98      


 


12/24/17 20:24     96 Venturi Mask  31


 


12/24/17 20:00  87      


 


12/24/17 20:00 97.5 87 24 90/60 (70) 92   





    Arterial Line    


 


12/24/17 19:00     93 Venturi Mask 3.00 31


 


12/24/17 18:00  86      


 


12/24/17 15:26     95 Venturi Mask 3.00 31


 


12/24/17 15:00  165 26 193/114 (140) 92   








Labs:





Laboratory Tests








Test


  12/25/17


04:50 12/25/17


11:15


 


White Blood Count


  26.8 TH/MM3


(4.0-11.0) 


 


 


Red Blood Count


  7.04 MIL/MM3


(4.00-5.30) 


 


 


Hemoglobin


  15.5 GM/DL


(11.6-15.3) 


 


 


Hematocrit


  47.9 %


(35.0-46.0) 


 


 


Mean Corpuscular Volume


  68.1 FL


(80.0-100.0) 


 


 


Mean Corpuscular Hemoglobin


  22.0 PG


(27.0-34.0) 


 


 


Mean Corpuscular Hemoglobin


Concent 32.3 %


(32.0-36.0) 


 


 


Red Cell Distribution Width


  19.0 %


(11.6-17.2) 


 


 


Platelet Count


  956 TH/MM3


(150-450) 


 


 


Mean Platelet Volume


  8.7 FL


(7.0-11.0) 


 


 


Neutrophils (%) (Auto)


  91.4 %


(16.0-70.0) 


 


 


Lymphocytes (%) (Auto)


  3.0 %


(9.0-44.0) 


 


 


Monocytes (%) (Auto)


  4.3 %


(0.0-8.0) 


 


 


Eosinophils (%) (Auto)


  1.0 %


(0.0-4.0) 


 


 


Basophils (%) (Auto)


  0.3 %


(0.0-2.0) 


 


 


Neutrophils # (Auto)


  24.5 TH/MM3


(1.8-7.7) 


 


 


Lymphocytes # (Auto)


  0.8 TH/MM3


(1.0-4.8) 


 


 


Monocytes # (Auto)


  1.1 TH/MM3


(0-0.9) 


 


 


Eosinophils # (Auto)


  0.3 TH/MM3


(0-0.4) 


 


 


Basophils # (Auto)


  0.1 TH/MM3


(0-0.2) 


 


 


CBC Comment AUTO DIFF  


 


Differential Total Cells


Counted 100 


  


 


 


Neutrophils % (Manual) 85 % (16-70)  


 


Band Neutrophils % 11 % (0-6)  


 


Lymphocytes % 1 % (9-44)  


 


Monocytes % 2 % (0-8)  


 


Basophils % 1 % (0-2)  


 


Neutrophils # (Manual)


  25.7 TH/MM3


(1.8-7.7) 


 


 


Differential Comment


  FINAL DIFF


MANUAL 


 


 


Toxic Vacuolation


  PRESENT (NONE


SEEN) 


 


 


Platelet Estimate HIGH (NORMAL)  


 


Platelet Morphology Comment


  ENLARGED


(NORMAL) 


 


 


Ovalocytes 1+ (NORMAL)  


 


Blood Urea Nitrogen 8 MG/DL (7-18)  


 


Creatinine


  0.67 MG/DL


(0.50-1.00) 


 


 


Random Glucose


  107 MG/DL


() 


 


 


Calcium Level


  8.5 MG/DL


(8.5-10.1) 


 


 


Sodium Level


  137 MEQ/L


(136-145) 


 


 


Potassium Level


  3.3 MEQ/L


(3.5-5.1) 


 


 


Chloride Level


  99 MEQ/L


() 


 


 


Carbon Dioxide Level


  25.2 MEQ/L


(21.0-32.0) 


 


 


Anion Gap


  13 MEQ/L


(5-15) 


 


 


Estimat Glomerular Filtration


Rate 86 ML/MIN


(>89) 


 


 


Activated Partial


Thromboplast Time 


  60.9 SEC


(24.3-30.1)








Result Diagram:  


12/25/17 0450                                                                  

              12/25/17 0450








(1) Peripheral vascular disease


(2) CAD (coronary artery disease)


(3) Cardiomyopathy


(4) Atrial fibrillation


(5) Hypertension











Amber Acosta MD Dec 25, 2017 12:14

## 2017-12-25 NOTE — PD.CARD.PN
Subjective


Subjective Remarks


No SOB, no CP, feels better





Objective


Medications





Current Medications








 Medications


  (Trade)  Dose


 Ordered  Sig/Carlitos


 Route  Start Time


 Stop Time Status Last Admin


 


  (NS Flush)  2 ml  UNSCH  PRN


 IV FLUSH  12/17/17 16:00


     


 


 


  (NS Flush)  2 ml  BID


 IV FLUSH  12/17/17 21:00


    12/25/17 08:33


 


 


  (Tylenol)  650 mg  Q4H  PRN


 PO  12/17/17 16:00


     


 


 


  (Zofran Inj)  4 mg  Q6H  PRN


 IVP  12/17/17 16:00


     


 


 


  (Restoril)  15 mg  HS  PRN


 PO  12/17/17 16:00


     


 


 


  (Narcan Inj)  0.4 mg  UNSCH  PRN


 IV PUSH  12/17/17 16:00


     


 


 


  (Tiarra-Colace)  1 tab  BID


 PO  12/17/17 21:00


    12/25/17 21:29


 


 


  (Milk Of


 Magnesia Liq)  30 ml  Q12H  PRN


 PO  12/17/17 16:00


     


 


 


  (Senokot)  17.2 mg  Q12H  PRN


 PO  12/17/17 16:00


     


 


 


  (Dulcolax Supp)  10 mg  DAILY  PRN


 RECTAL  12/17/17 16:00


     


 


 


  (Lactulose Liq)  30 ml  DAILY  PRN


 PO  12/17/17 16:00


     


 


 


 Heparin Sodium/


 Dextrose  250 ml @ 


 10 mls/hr  TITRATE  PRN


 IV  12/18/17 06:30


   Future Hold 12/25/17 10:30


 


 


 Lactated Ringer's  1,000 ml @ 


 40 mls/hr  Q24H


 IV  12/20/17 13:00


    12/24/17 19:52


 


 


  (Plavix)  75 mg  DAILY


 PO  12/22/17 17:15


    12/25/17 08:33


 


 


  (Duoneb Neb)  1 ampule  QID  NEB


 NEB  12/24/17 16:00


    12/25/17 19:55


 


 


  (Lanoxin)  0.125 mg  DAILY


 PO  12/24/17 17:00


    12/25/17 08:33


 


 


 Cefazolin Sodium


 1000 mg/Sodium


 Chloride  100 ml @ 


 200 mls/hr  ON  CALL


 IV  12/25/17 12:15


 12/28/17 12:14   


 


 


  (Lopressor)  25 mg  Q12HR


 PO  12/25/17 21:00


    12/25/17 21:29


 


 


 Piperacillin Sod/


 Tazobactam Sod  100 ml @ 


 200 mls/hr  Q6H


 IV  12/25/17 15:00


    12/25/17 21:29


 


 


 Pharmacy Profile


 Note  0 ml @ 0


 mls/hr  UNSCH


 OTHER  12/25/17 13:45


     


 


 


  (Duoneb Neb)  1 ampule  Q4HR NEB  PRN


 INH  12/25/17 13:45


     


 


 


 Vancomycin HCl


 750 mg/Sodium


 Chloride  257.5 ml @ 


 250 mls/hr  Q18H


 IV  12/25/17 16:00


    12/25/17 16:56


 


 


 Miscellaneous


 Information  SPECIFIC LAB TO BE


 DRAWN:VANCO TROUGH


 DATE TO BE DR...  ONCE  ONCE


 .XX  12/27/17 21:45


 12/27/17 21:46   


 








Vital Signs / I&O





Vital Signs








  Date Time  Temp Pulse Resp B/P (MAP) Pulse Ox O2 Delivery O2 Flow Rate FiO2


 


12/25/17 19:56     95 Nasal Cannula 4.00 


 


12/25/17 18:00  92      


 


12/25/17 16:00 98.2 95 20 117/65 (82) 97   


 


12/25/17 16:00  93      


 


12/25/17 15:00  95      


 


12/25/17 14:00  93      


 


12/25/17 12:00  112      


 


12/25/17 12:00 98.6 112 23 107/62 (77) 94   


 


12/25/17 10:00  105      


 


12/25/17 08:00  100      


 


12/25/17 08:00 97.6 100 17 118/58 (78) 90   


 


12/25/17 07:42     94 Venturi Mask  50


 


12/25/17 07:00  100      


 


12/25/17 07:00     97 Venturi Mask 6.00 40


 


12/25/17 06:00  87      


 


12/25/17 04:00 98.1 90 16 102/57 (72) 94   


 


12/25/17 04:00  90      


 


12/25/17 02:00  86      


 


12/25/17 00:00  98      


 


12/25/17 00:00 98.0 98 12 101/60 (74) 92   


 


12/24/17 23:00  96      


 


12/24/17 22:00  98      














I/O      


 


 12/24/17 12/24/17 12/24/17 12/25/17 12/25/17 12/25/17





 07:00 15:00 23:00 07:00 15:00 23:00


 


Intake Total 240 ml  50 ml   320 ml


 


Output Total 650 ml  100 ml 325 ml  325 ml


 


Balance -410 ml  -50 ml -325 ml  -5 ml


 


      


 


Intake Oral 240 ml  50 ml   320 ml


 


Output Urine Total 650 ml  100 ml 325 ml  325 ml


 


# Bowel Movements    1  1








Physical Exam


GENERAL: In mod resp distress


SKIN: Warm and dry.


HEAD: Normocephalic.


EYES: No scleral icterus. No injection or drainage. 


NECK: Supple, trachea midline. No JVD or lymphadenopathy.


CARDIOVASCULAR: Irregular rate and rhythm, tachycardic, without murmurs, gallops

, or rubs. 


RESPIRATORY: Breath sounds equal bilaterally. Few rhonchi.


GASTROINTESTINAL: Abdomen soft, non-tender, nondistended. 


MUSCULOSKELETAL: No cyanosis, or edema.





Laboratory





Laboratory Tests








Test


  12/25/17


04:50 12/25/17


11:15 12/25/17


14:00 12/25/17


16:45


 


White Blood Count 26.8 TH/MM3    


 


Red Blood Count 7.04 MIL/MM3    


 


Hemoglobin 15.5 GM/DL    


 


Hematocrit 47.9 %    


 


Mean Corpuscular Volume 68.1 FL    


 


Mean Corpuscular Hemoglobin 22.0 PG    


 


Mean Corpuscular Hemoglobin


Concent 32.3 % 


  


  


  


 


 


Red Cell Distribution Width 19.0 %    


 


Platelet Count 956 TH/MM3    


 


Mean Platelet Volume 8.7 FL    


 


Neutrophils (%) (Auto) 91.4 %    


 


Lymphocytes (%) (Auto) 3.0 %    


 


Monocytes (%) (Auto) 4.3 %    


 


Eosinophils (%) (Auto) 1.0 %    


 


Basophils (%) (Auto) 0.3 %    


 


Neutrophils # (Auto) 24.5 TH/MM3    


 


Lymphocytes # (Auto) 0.8 TH/MM3    


 


Monocytes # (Auto) 1.1 TH/MM3    


 


Eosinophils # (Auto) 0.3 TH/MM3    


 


Basophils # (Auto) 0.1 TH/MM3    


 


CBC Comment AUTO DIFF    


 


Differential Total Cells


Counted 100 


  


  


  


 


 


Neutrophils % (Manual) 85 %    


 


Band Neutrophils % 11 %    


 


Lymphocytes % 1 %    


 


Monocytes % 2 %    


 


Basophils % 1 %    


 


Neutrophils # (Manual) 25.7 TH/MM3    


 


Differential Comment


  FINAL DIFF


MANUAL 


  


  


 


 


Toxic Vacuolation PRESENT    


 


Platelet Estimate HIGH    


 


Platelet Morphology Comment ENLARGED    


 


Ovalocytes 1+    


 


Blood Urea Nitrogen 8 MG/DL    


 


Creatinine 0.67 MG/DL    


 


Random Glucose 107 MG/DL    


 


Calcium Level 8.5 MG/DL    


 


Sodium Level 137 MEQ/L    


 


Potassium Level 3.3 MEQ/L    


 


Chloride Level 99 MEQ/L    


 


Carbon Dioxide Level 25.2 MEQ/L    


 


Anion Gap 13 MEQ/L    


 


Estimat Glomerular Filtration


Rate 86 ML/MIN 


  


  


  


 


 


Activated Partial


Thromboplast Time 


  60.9 SEC 


  


  


 


 


Blood Gas Puncture Site   RT RADIAL  


 


Blood Gas Patient Temperature   98.6  


 


Blood Gas HCO3   27 mmol/L  


 


Blood Gas Base Excess   3.3 mmol/L  


 


Blood Gas Oxygen Saturation   94 %  


 


Arterial Blood pH   7.50  


 


Arterial Blood Partial


Pressure CO2 


  


  35 mmHg 


  


 


 


Arterial Blood Partial


Pressure O2 


  


  81 mmHg 


  


 


 


Arterial Blood Oxygen Content   20.4 Vol %  


 


Arterial Blood


Carboxyhemoglobin 


  


  1.4 % 


  


 


 


Arterial Blood Methemoglobin   0.9 %  


 


Blood Gas Hemoglobin   15.4 G/DL  


 


Oxygen Delivery Device   NASAL CANNULA  


 


Blood Gas Liter Flow   4 L/M  


 


Lactic Acid Level    1.5 mmol/L 


 


Test


  12/25/17


18:45 


  


  


 


 


Urine Color YELLOW    


 


Urine Turbidity HAZY    


 


Urine pH 6.0    


 


Urine Specific Gravity 1.012    


 


Urine Protein TRACE mg/dL    


 


Urine Glucose (UA) NEG mg/dL    


 


Urine Ketones NEG mg/dL    


 


Urine Occult Blood TRACE    


 


Urine Nitrite NEG    


 


Urine Bilirubin NEG    


 


Urine Urobilinogen


  LESS THAN 2.0


MG/DL 


  


  


 


 


Urine Leukocyte Esterase LARGE    


 


Urine RBC 22 /hpf    


 


Urine  /hpf    


 


Urine WBC Clumps RARE    


 


Urine Squamous Epithelial


Cells <1 /hpf 


  


  


  


 


 


Urine Transitional Epithelial


Cells <1 /hpf 


  


  


  


 


 


Urine Bacteria OCC /hpf    


 


Urine Mucus FEW /lpf    


 


Urine Yeast with Hyphae FEW    


 


Urine Yeast (Budding) MANY    


 


Microscopic Urinalysis Comment


  CULTURE


INDICATED 


  


  


 








Imaging





Last 24 hours Impressions








Chest X-Ray 12/25/17 0000 Signed





Impressions: 





 Service Date/Time:  Monday, December 25, 2017 15:44 - CONCLUSION:  No 





 significant change in bilateral interstitial opacities suggesting pulmonary 





 edema. Small bilateral pleural effusions.     Ebenezer Mariscal MD 











Assessment and Plan


Problem List:  


(1) Peripheral vascular disease


ICD Codes:  I73.9 - Peripheral vascular disease, unspecified


Status:  Acute


(2) CAD (coronary artery disease)


ICD Codes:  I25.10 - Atherosclerotic heart disease of native coronary artery 

without angina pectoris


(3) Cardiomyopathy


ICD Codes:  I42.9 - Cardiomyopathy, unspecified


(4) Atrial fibrillation


ICD Codes:  I48.91 - Unspecified atrial fibrillation


(5) Hypertension


ICD Codes:  I10 - Essential (primary) hypertension


Assessment and Plan


Rhythm stable. No angina or CHF exacerbation. Continue ICU monitoring. RLE 

revascularization tomorrow to prevent limb loss.











Gonzalo Gerard MD Dec 25, 2017 21:37

## 2017-12-25 NOTE — HHI.PR
Subjective


Remarks


looking ill , despite denying  cough or cp , however she is on 4 l nc [facility] 

to 26k, hb 15, bnp 1730


[facility] , however she looks too sick and 

she is in hypoxia , i will work this up , I also d/w dr Beckett the intensivist , 

pt may be transfered to his service if not improving





Objective


Vitals





Vital Signs








  Date Time  Temp Pulse Resp B/P (MAP) Pulse Ox O2 Delivery O2 Flow Rate FiO2


 


12/25/17 12:00  112      


 


12/25/17 12:00 98.6 112 23 107/62 (77) 94   


 


12/25/17 10:00  105      


 


12/25/17 08:00  100      


 


12/25/17 08:00 97.6 100 17 118/58 (78) 90   


 


12/25/17 07:42     94 Venturi Mask  50


 


12/25/17 07:00  100      


 


12/25/17 07:00     97 Venturi Mask 6.00 40


 


12/25/17 06:00  87      


 


12/25/17 04:00 98.1 90 16 102/57 (72) 94   


 


12/25/17 04:00  90      


 


12/25/17 02:00  86      


 


12/25/17 00:00  98      


 


12/25/17 00:00 98.0 98 12 101/60 (74) 92   


 


12/24/17 23:00  96      


 


12/24/17 22:00  98      


 


12/24/17 20:24     96 Venturi Mask  31


 


12/24/17 20:00  87      


 


12/24/17 20:00 97.5 87 24 90/60 (70) 92   





    Arterial Line    


 


12/24/17 19:00     93 Venturi Mask 3.00 31


 


12/24/17 18:00  86      


 


12/24/17 15:26     95 Venturi Mask 3.00 31


 


12/24/17 15:00  165 26 193/114 (140) 92   














I/O      


 


 12/24/17 12/24/17 12/24/17 12/25/17 12/25/17 12/25/17





 07:00 15:00 23:00 07:00 15:00 23:00


 


Intake Total 240 ml  50 ml   


 


Output Total 650 ml  100 ml 325 ml  


 


Balance -410 ml  -50 ml -325 ml  


 


      


 


Intake Oral 240 ml  50 ml   


 


Output Urine Total 650 ml  100 ml 325 ml  


 


# Bowel Movements    1  








Result Diagram:  


12/25/17 0450                                                                  

              12/25/17 0450





Objective Remarks


GENERAL: This is well-developed patient, in no apparent distress.


CARDIOVASCULAR: Regular rate and rhythm without murmurs, gallops, or rubs. 


RESPIRATORY: bibasilar cracles . No wheezes, rales, or rhonchi.  


GASTROINTESTINAL: Abdomen soft, non-tender, nondistended. Normal active bowel 

sounds


MUSCULOSKELETAL: Extremities without clubbing, cyanosis, or edema.


NEURO:  Alert & Oriented x4 to person, place, time, situation.  Moves all ext x4


Procedures











KASHIF THAPA











 **Signed**





 EXAM DATE/TIME:  12/17/2017 17:47 


 


[facility] COMPARISON:     


No previous studies available for comparison.


 


 


INDICATIONS :     


Right leg pain; evaluate for occlusion.


                      


 


IV CONTRAST:     


100 cc Omnipaque 350 (iohexol) IV 


 


 


RADIATION DOSE:     


2.3 CTDIvol (mGy) 


 


 


MEDICAL HISTORY :     


Cerebrovascular disease.  


 


SURGICAL HISTORY :      


Tubal ligation. 


 


ENCOUNTER:     


Initial


 


ACUITY:     


1 month


 


PAIN SCALE:     


7/10


 


LOCATION:      


Right lower leg


 


TECHNIQUE:     


Volumetric scanning was performed using a multi-row detector CT scanner.  The 

data was post processed with a variety of visualization algorithms including 

full volume maximum intensity projection, multi-planar sliding thin slab 

reformation, curved planar reformation, and surface rendering techniques.  

Using automated exposure control and adjustment of the mA and/or kV according 

to patient size, radiation dose was kept as low as reasonably achievable to 

obtain optimal diagnostic quality images.   DICOM format image data is 

available electronically for review and comparison.  


 


FINDINGS:     


There are atherosclerotic changes seen throughout the arterial system.  The 

abdominal aorta measures up to 2.9 cm.  At the distal infrarenal abdominal 

aorta thrombus occupies more than half of the lumen.  The functional lumen is 

seen at the left lateral aspect of the distal abdominal aorta.  There is 

atherosclerotic change at the common iliac arteries bilaterally.  A significant 

stenosis is not seen at this level is not seen.  There does appear to be severe 

stenosis and possible occlusion at the origin of the right internal iliac 

artery.  There is mild plaque seen at the right external iliac artery.  There 

is a severe stenosis at the distal left external iliac artery.


 


There is atherosclerotic change seen at the common femoral arteries bilaterally 

being more severe on the right.  There is a severe stenosis at the right common 

femoral artery narrowing the lumen by approximately 80%.  There is severe 

narrowing of the proximal right profunda femoris artery.  There is abrupt 

occlusion of the proximal right superficial femoral artery.  Flow in the thigh 

is continues through collaterals in the profunda femoris artery distribution.  

There is reconstitution of the distal popliteal artery at the level of the 

distal femur via collaterals.  There is a normal trifurcation.  The right 

anterior tibial artery can be traced to the ankle.  The posterior tibial and 

peritoneal arteries can be seen to the distal lower leg but can not clearly be 

traced into the foot.  


 


Again noted is the mild stenosis at the left common femoral artery.  There is 

occlusion of the proximal left superficial femoral artery.  The popliteal 

artery is patent throughout.  There is reconstitution of the distal superficial 

femoral artery at the level of the distal femoral shaft.  The popliteal artery 

is patent.  There is a severe stenosis at the mid popliteal artery.  The lumen 

is narrowed by over 75%.  The more distal popliteal artery is patent.  The 

anterior and posterior tibial artery could be traced to the foot.  The 

peritoneal artery can be traced to the ankle region.  


 


Atherosclerotic calcifications are seen throughout the origins of the vessels 

in the upper abdomen.  There does appear to be at least a moderate stenosis at 

the origin of the right renal artery.  The left renal artery appears patent.  

The celiac, SMA and DMITRI are patent.  


 


There is a mild hiatal hernia.  The liver, spleen, pancreas and kidneys appear 

grossly normal.  The adrenal glands are grossly normal.  The pelvic structures 

appear intact.  The patient does have a Arevalo catheter in the urinary bladder.  

There is degenerative change in the lumbar spine.  


 


CONCLUSION:


1. Atherosclerotic changes seen throughout the arterial system, including 

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 

mural thrombus.


2. Atherosclerotic change in the external iliac and common femoral arteries 

bilaterally as described above. 


3. Occlusion of the superficial femoral arteries bilaterally with 

reconstitution distally.  The reconstitution is higher on the left side. 


4. Normal trifurcation vessels seen on the left side.


5. Diminished flow seen at the right trifurcation vessels.  The vessels can 

only be well seen on the delayed images.  The vessels can not be traced into 

the foot.      


 


 


 


 Emir Sorensen MD on December 17, 2017 at 19:28                


Board Certified Radiologist.


 This report was verified electronically.   














On 12/22 by Dr. PEREZ :


Exploration of the left groin, left common femoral and external iliac artery.


Endarterectomy and patch angioplasty.  External and common artery


thromboembolectomy, superficial femoral popliteal artery thromboembolectomy.


Arteriogram.





A/P


Assessment and Plan


12/23: Patient status post revascularization, further intervention per [facility]


12/24: Patient feels worse today, short of breath with some chest tightness, 

she is tachycardic in 1:30 to 140, hypertensive 210/111, will order stat chest x

-ray, EKG, earlier I ordered Lopressor 25 mg to improve blood pressure and 

heart rate, stress nuclear testing showed suspected RCA infarct, cardiovascular 

surgery planning on possible right femoral endarterectomy on Tuesday.


Discussed with cardiology Dr. mccarty, will transfer to ICU





12/25: pt looks sicker today , on 4 l o2 nc >>will check cxr, c diff , ua , 

will add vanco and zosyn for braod coverage for possible HCAP, d/w intensivist 

for possible later transfer service .


pt may need thoracenthesis prior to sx





A/P:


Peripheral arterial vascular disease with Bilateral LE  discoloration and 

unable to feel DP pulses. Patient with pain and cold extremities.  Status post 

CTA Ao run off, vascular surgery  Dr. SANTOS FOLLOWING FOR femoropopliteal 

bypass





Started on antibiotic Rocephin IV . Received vanco and zosyn in the ED. 

Received bolus of NS in the eD. Continue IVF. Monitor VS closely. 


Urine cultures shows Escherichia coli UTI sensitive to Rocephin  





Restart home meds as appropriate patient says she currently doesn't take any 

meds. 








Consult PT for eval CONSULT OT





Consult case management for DC plan 





DVT ppx lovenox





History of cerebrovascular accident 





Gait instability possibly secondary to cerebrovascular accident versus 

peripheral vascular occlusive disease





Deconditioning Will need SNF and PT and OT











Marcus Childers MD Dec 25, 2017 13:55

## 2017-12-25 NOTE — RADRPT
EXAM DATE/TIME:  12/25/2017 15:44 

 

[facility] COMPARISON:     

CTA RUNOFF W 3D RECON, December 17, 2017, 17:47.  CHEST SINGLE AP, December 24, 2017, 15:11.

 

                     

INDICATIONS :     

Shortness of breath

                     

 

MEDICAL HISTORY :            

Stroke. Hypertension   

 

SURGICAL HISTORY :        

Tonsillectomy. Tubal ligation

 

ENCOUNTER:     

Subsequent                                        

 

ACUITY:     

2 days      

 

PAIN SCORE:     

Non-responsive.

 

LOCATION:     

Bilateral chest 

 

FINDINGS:     

PA lateral views of the chest. Diffuse interstitial opacity lungs again seen along with mild hazy opa
city at the lung bases. Small bilateral pleural effusions on the lateral view. Cardiomediastinal silh
ouette unchanged. No evidence of pneumothorax.

 

CONCLUSION:     

No significant change in bilateral interstitial opacities suggesting pulmonary edema. Small bilateral
 pleural effusions.

 

 

 

 Ebenezer Mariscal MD on December 25, 2017 at 16:02           

Board Certified Radiologist.

 This report was verified electronically.

## 2017-12-26 VITALS
SYSTOLIC BLOOD PRESSURE: 132 MMHG | HEART RATE: 90 BPM | DIASTOLIC BLOOD PRESSURE: 80 MMHG | RESPIRATION RATE: 20 BRPM | OXYGEN SATURATION: 96 % | TEMPERATURE: 98 F

## 2017-12-26 VITALS
SYSTOLIC BLOOD PRESSURE: 114 MMHG | TEMPERATURE: 98.1 F | OXYGEN SATURATION: 95 % | RESPIRATION RATE: 13 BRPM | DIASTOLIC BLOOD PRESSURE: 69 MMHG | HEART RATE: 85 BPM

## 2017-12-26 VITALS
HEART RATE: 77 BPM | DIASTOLIC BLOOD PRESSURE: 70 MMHG | SYSTOLIC BLOOD PRESSURE: 114 MMHG | RESPIRATION RATE: 21 BRPM | TEMPERATURE: 97.5 F | OXYGEN SATURATION: 96 %

## 2017-12-26 VITALS
TEMPERATURE: 98.4 F | OXYGEN SATURATION: 99 % | SYSTOLIC BLOOD PRESSURE: 103 MMHG | DIASTOLIC BLOOD PRESSURE: 57 MMHG | RESPIRATION RATE: 26 BRPM | HEART RATE: 83 BPM

## 2017-12-26 VITALS
OXYGEN SATURATION: 91 % | TEMPERATURE: 97.6 F | RESPIRATION RATE: 12 BRPM | DIASTOLIC BLOOD PRESSURE: 48 MMHG | HEART RATE: 75 BPM | SYSTOLIC BLOOD PRESSURE: 112 MMHG

## 2017-12-26 VITALS — OXYGEN SATURATION: 96 %

## 2017-12-26 VITALS — HEART RATE: 79 BPM

## 2017-12-26 VITALS — HEART RATE: 90 BPM

## 2017-12-26 VITALS — HEART RATE: 80 BPM

## 2017-12-26 VITALS — HEART RATE: 73 BPM

## 2017-12-26 VITALS — HEART RATE: 82 BPM

## 2017-12-26 VITALS — OXYGEN SATURATION: 95 %

## 2017-12-26 VITALS — HEART RATE: 88 BPM

## 2017-12-26 VITALS — HEART RATE: 86 BPM

## 2017-12-26 LAB
BASOPHILS # BLD AUTO: 0.1 TH/MM3 (ref 0–0.2)
BASOPHILS # BLD AUTO: 0.2 TH/MM3 (ref 0–0.2)
BASOPHILS NFR BLD AUTO: 1 % (ref 0–2)
BASOPHILS NFR BLD: 0.5 % (ref 0–2)
BASOPHILS NFR BLD: 0.9 % (ref 0–2)
EOSINOPHIL # BLD: 0.5 TH/MM3 (ref 0–0.4)
EOSINOPHIL # BLD: 0.7 TH/MM3 (ref 0–0.4)
EOSINOPHIL NFR BLD: 2.7 % (ref 0–4)
EOSINOPHIL NFR BLD: 3 % (ref 0–4)
ERYTHROCYTE [DISTWIDTH] IN BLOOD BY AUTOMATED COUNT: 18.6 % (ref 11.6–17.2)
ERYTHROCYTE [DISTWIDTH] IN BLOOD BY AUTOMATED COUNT: 19.4 % (ref 11.6–17.2)
HCT VFR BLD CALC: 42.2 % (ref 35–46)
HCT VFR BLD CALC: 43 % (ref 35–46)
HGB BLD-MCNC: 13.7 GM/DL (ref 11.6–15.3)
HGB BLD-MCNC: 13.9 GM/DL (ref 11.6–15.3)
LYMPHOCYTES # BLD AUTO: 0.8 TH/MM3 (ref 1–4.8)
LYMPHOCYTES # BLD AUTO: 0.8 TH/MM3 (ref 1–4.8)
LYMPHOCYTES NFR BLD AUTO: 3.4 % (ref 9–44)
LYMPHOCYTES NFR BLD AUTO: 4.3 % (ref 9–44)
LYMPHOCYTES: 2 % (ref 9–44)
MCH RBC QN AUTO: 22 PG (ref 27–34)
MCH RBC QN AUTO: 22.7 PG (ref 27–34)
MCHC RBC AUTO-ENTMCNC: 31.8 % (ref 32–36)
MCHC RBC AUTO-ENTMCNC: 32.9 % (ref 32–36)
MCV RBC AUTO: 69.1 FL (ref 80–100)
MCV RBC AUTO: 69.2 FL (ref 80–100)
MONOCYTE #: 0.9 TH/MM3 (ref 0–0.9)
MONOCYTE #: 1.3 TH/MM3 (ref 0–0.9)
MONOCYTES NFR BLD: 5.1 % (ref 0–8)
MONOCYTES NFR BLD: 5.3 % (ref 0–8)
MONOCYTES: 8 % (ref 0–8)
NEUTROPHILS # BLD AUTO: 15.3 TH/MM3 (ref 1.8–7.7)
NEUTROPHILS # BLD AUTO: 21.4 TH/MM3 (ref 1.8–7.7)
NEUTROPHILS NFR BLD AUTO: 86.7 % (ref 16–70)
NEUTROPHILS NFR BLD AUTO: 88.1 % (ref 16–70)
NEUTS BAND # BLD MANUAL: 20.6 TH/MM3 (ref 1.8–7.7)
NEUTS BAND NFR BLD: 6 % (ref 0–6)
NEUTS SEG NFR BLD MANUAL: 79 % (ref 16–70)
OVALOCYTES BLD QL SMEAR: (no result)
PLATELET # BLD: 1085 TH/MM3 (ref 150–450)
PLATELET # BLD: 946 TH/MM3 (ref 150–450)
PMV BLD AUTO: 8.4 FL (ref 7–11)
PMV BLD AUTO: 8.8 FL (ref 7–11)
RBC # BLD AUTO: 6.11 MIL/MM3 (ref 4–5.3)
RBC # BLD AUTO: 6.22 MIL/MM3 (ref 4–5.3)
WBC # BLD AUTO: 17.7 TH/MM3 (ref 4–11)
WBC # BLD AUTO: 24.2 TH/MM3 (ref 4–11)

## 2017-12-26 PROCEDURE — 04CH0ZZ EXTIRPATION OF MATTER FROM RIGHT EXTERNAL ILIAC ARTERY, OPEN APPROACH: ICD-10-PCS | Performed by: SURGERY

## 2017-12-26 PROCEDURE — 041K0JL BYPASS RIGHT FEMORAL ARTERY TO POPLITEAL ARTERY WITH SYNTHETIC SUBSTITUTE, OPEN APPROACH: ICD-10-PCS | Performed by: SURGERY

## 2017-12-26 PROCEDURE — 04UH0KZ SUPPLEMENT RIGHT EXTERNAL ILIAC ARTERY WITH NONAUTOLOGOUS TISSUE SUBSTITUTE, OPEN APPROACH: ICD-10-PCS | Performed by: SURGERY

## 2017-12-26 PROCEDURE — 04UK0KZ SUPPLEMENT RIGHT FEMORAL ARTERY WITH NONAUTOLOGOUS TISSUE SUBSTITUTE, OPEN APPROACH: ICD-10-PCS | Performed by: SURGERY

## 2017-12-26 PROCEDURE — 04CK0ZZ EXTIRPATION OF MATTER FROM RIGHT FEMORAL ARTERY, OPEN APPROACH: ICD-10-PCS | Performed by: SURGERY

## 2017-12-26 RX ADMIN — STANDARDIZED SENNA CONCENTRATE AND DOCUSATE SODIUM SCH TAB: 8.6; 5 TABLET, FILM COATED ORAL at 08:46

## 2017-12-26 RX ADMIN — CLOPIDOGREL BISULFATE SCH MG: 75 TABLET, FILM COATED ORAL at 08:47

## 2017-12-26 RX ADMIN — TAZOBACTAM SODIUM AND PIPERACILLIN SODIUM SCH MLS/HR: 500; 4 INJECTION, SOLUTION INTRAVENOUS at 21:27

## 2017-12-26 RX ADMIN — TAZOBACTAM SODIUM AND PIPERACILLIN SODIUM SCH MLS/HR: 500; 4 INJECTION, SOLUTION INTRAVENOUS at 15:00

## 2017-12-26 RX ADMIN — TAZOBACTAM SODIUM AND PIPERACILLIN SODIUM SCH MLS/HR: 500; 4 INJECTION, SOLUTION INTRAVENOUS at 02:38

## 2017-12-26 RX ADMIN — Medication SCH ML: at 21:27

## 2017-12-26 RX ADMIN — SODIUM CHLORIDE SCH MLS/HR: 900 INJECTION INTRAVENOUS at 09:38

## 2017-12-26 RX ADMIN — METOPROLOL TARTRATE SCH MG: 25 TABLET, FILM COATED ORAL at 08:46

## 2017-12-26 RX ADMIN — STANDARDIZED SENNA CONCENTRATE AND DOCUSATE SODIUM SCH TAB: 8.6; 5 TABLET, FILM COATED ORAL at 21:28

## 2017-12-26 RX ADMIN — TAZOBACTAM SODIUM AND PIPERACILLIN SODIUM SCH MLS/HR: 500; 4 INJECTION, SOLUTION INTRAVENOUS at 08:46

## 2017-12-26 RX ADMIN — METOPROLOL TARTRATE SCH MG: 25 TABLET, FILM COATED ORAL at 21:00

## 2017-12-26 RX ADMIN — Medication SCH ML: at 08:47

## 2017-12-26 RX ADMIN — OXYTOCIN SCH MLS/HR: 10 INJECTION, SOLUTION INTRAMUSCULAR; INTRAVENOUS at 21:19

## 2017-12-26 RX ADMIN — DIGOXIN SCH MG: 125 TABLET ORAL at 08:46

## 2017-12-26 RX ADMIN — IPRATROPIUM BROMIDE AND ALBUTEROL SULFATE SCH AMPULE: .5; 3 SOLUTION RESPIRATORY (INHALATION) at 15:49

## 2017-12-26 RX ADMIN — IPRATROPIUM BROMIDE AND ALBUTEROL SULFATE SCH AMPULE: .5; 3 SOLUTION RESPIRATORY (INHALATION) at 07:56

## 2017-12-26 RX ADMIN — IPRATROPIUM BROMIDE AND ALBUTEROL SULFATE SCH AMPULE: .5; 3 SOLUTION RESPIRATORY (INHALATION) at 11:18

## 2017-12-26 RX ADMIN — IPRATROPIUM BROMIDE AND ALBUTEROL SULFATE SCH AMPULE: .5; 3 SOLUTION RESPIRATORY (INHALATION) at 20:32

## 2017-12-26 NOTE — PD.CARD.PN
Subjective


Subjective Remarks


No SOB or CP, tolerated surgery well





Objective


Medications





Current Medications








 Medications


  (Trade)  Dose


 Ordered  Sig/Carlitos


 Route  Start Time


 Stop Time Status Last Admin


 


  (NS Flush)  2 ml  UNSCH  PRN


 IV FLUSH  12/17/17 16:00


     


 


 


  (NS Flush)  2 ml  BID


 IV FLUSH  12/17/17 21:00


    12/25/17 08:33


 


 


  (Tylenol)  650 mg  Q4H  PRN


 PO  12/17/17 16:00


     


 


 


  (Zofran Inj)  4 mg  Q6H  PRN


 IVP  12/17/17 16:00


     


 


 


  (Restoril)  15 mg  HS  PRN


 PO  12/17/17 16:00


     


 


 


  (Narcan Inj)  0.4 mg  UNSCH  PRN


 IV PUSH  12/17/17 16:00


     


 


 


  (Tiarra-Colace)  1 tab  BID


 PO  12/17/17 21:00


    12/26/17 08:46


 


 


  (Milk Of


 Magnesia Liq)  30 ml  Q12H  PRN


 PO  12/17/17 16:00


     


 


 


  (Senokot)  17.2 mg  Q12H  PRN


 PO  12/17/17 16:00


     


 


 


  (Dulcolax Supp)  10 mg  DAILY  PRN


 RECTAL  12/17/17 16:00


     


 


 


  (Lactulose Liq)  30 ml  DAILY  PRN


 PO  12/17/17 16:00


     


 


 


 Lactated Ringer's  1,000 ml @ 


 40 mls/hr  Q24H


 IV  12/20/17 13:00


    12/24/17 19:52


 


 


  (Plavix)  75 mg  DAILY


 PO  12/22/17 17:15


    12/25/17 08:33


 


 


  (Duoneb Neb)  1 ampule  QID  NEB


 NEB  12/24/17 16:00


    12/26/17 07:56


 


 


  (Lanoxin)  0.125 mg  DAILY


 PO  12/24/17 17:00


    12/26/17 08:46


 


 


 Cefazolin Sodium


 1000 mg/Sodium


 Chloride  100 ml @ 


 200 mls/hr  ON  CALL


 IV  12/25/17 12:15


 12/28/17 12:14   


 


 


  (Lopressor)  25 mg  Q12HR


 PO  12/25/17 21:00


    12/26/17 08:46


 


 


 Piperacillin Sod/


 Tazobactam Sod  100 ml @ 


 200 mls/hr  Q6H


 IV  12/25/17 15:00


    12/26/17 08:46


 


 


 Pharmacy Profile


 Note  0 ml @ 0


 mls/hr  UNSCH


 OTHER  12/25/17 13:45


     


 


 


  (Duoneb Neb)  1 ampule  Q4HR NEB  PRN


 INH  12/25/17 13:45


     


 


 


 Vancomycin HCl


 750 mg/Sodium


 Chloride  257.5 ml @ 


 250 mls/hr  Q18H


 IV  12/25/17 16:00


    12/26/17 09:38


 


 


 Miscellaneous


 Information  SPECIFIC LAB TO BE


 DRAWN:VANCO TROUGH


 DATE TO BE DR...  ONCE  ONCE


 .XX  12/27/17 21:45


 12/27/17 21:46   


 


 


 Miscellaneous


 Information  ALL


 NURSING


 DEPARTME...  UNSCH  PRN


 .XX  12/26/17 16:15


 12/27/17 16:14   


 








Vital Signs / I&O





Vital Signs








  Date Time  Temp Pulse Resp B/P (MAP) Pulse Ox O2 Delivery O2 Flow Rate FiO2


 


12/26/17 18:00  73      


 


12/26/17 16:15 97.6 81 20 127/63 (84) 99 Nasal Cannula 2 


 


12/26/17 16:00 97.6 75 12 112/59 (76) 91   





    115/48 (70)    


 


12/26/17 16:00  75      


 


12/26/17 16:00  81 20 129/61 (83) 100 Nasal Cannula 2 


 


12/26/17 15:45  79 20 111/56 (74) 99 Nasal Cannula 2 


 


12/26/17 15:30  80 20 124/66 (85) 96 Nasal Cannula 2 


 


12/26/17 15:15 97.6 80 20 127/67 (87) 95 Nasal Cannula 2 


 


12/26/17 11:31 98.3 80 17 133/72 (92) 95   


 


12/26/17 11:29     95 Nasal Cannula 2 


 


12/26/17 10:00  82      


 


12/26/17 08:00  90      


 


12/26/17 08:00 98.0 88 20 132/80 (97) 96   


 


12/26/17 07:57     95 Nasal Cannula 4.00 


 


12/26/17 07:00     94 Nasal Cannula 4.00 


 


12/26/17 07:00  88      


 


12/26/17 06:00  80      


 


12/26/17 04:00 98.1 85 13 114/69 (84) 95   


 


12/26/17 04:00  81      


 


12/26/17 02:00  79      


 


12/26/17 00:00  83      


 


12/26/17 00:00  83      


 


12/26/17 00:00 98.4 84 26 103/57 (72) 99   


 


12/25/17 23:00  95      


 


12/25/17 22:00  81      


 


12/25/17 20:00 98.0 94 26 102/58 (73) 99   


 


12/25/17 20:00  90      


 


12/25/17 20:00     95 Nasal Cannula 4.00 


 


12/25/17 19:56     95 Nasal Cannula 4.00 














I/O      


 


 12/25/17 12/25/17 12/25/17 12/26/17 12/26/17 12/26/17





 07:00 15:00 23:00 07:00 15:00 23:00


 


Intake Total   420 ml 100 ml  1700 ml


 


Output Total 325 ml  325 ml 450 ml 850 ml 1965 ml


 


Balance -325 ml  95 ml -350 ml -850 ml -265 ml


 


      


 


Intake Oral   320 ml   200 ml


 


IV Total   100 ml 100 ml  


 


Other      1500 ml


 


Output Urine Total 325 ml  325 ml 450 ml 850 ml 1800 ml


 


Drainage Total      15 ml


 


Estimated Blood Loss      150 ml


 


# Bowel Movements 1  1 1  1








Physical Exam


GENERAL: In mod resp distress


SKIN: Warm and dry.


HEAD: Normocephalic.


EYES: No scleral icterus. No injection or drainage. 


NECK: Supple, trachea midline. No JVD or lymphadenopathy.


CARDIOVASCULAR: Irregular rate and rhythm, tachycardic, without murmurs, gallops

, or rubs. 


RESPIRATORY: Breath sounds equal bilaterally. Few rhonchi.


GASTROINTESTINAL: Abdomen soft, non-tender, nondistended. 


MUSCULOSKELETAL: No cyanosis, or edema.





Laboratory





Laboratory Tests








Test


  12/26/17


03:42 12/26/17


09:16 12/26/17


15:36


 


White Blood Count 17.7 TH/MM3   24.2 TH/MM3 


 


Red Blood Count 6.11 MIL/MM3   6.22 MIL/MM3 


 


Hemoglobin 13.9 GM/DL   13.7 GM/DL 


 


Hematocrit 42.2 %   43.0 % 


 


Mean Corpuscular Volume 69.1 FL   69.2 FL 


 


Mean Corpuscular Hemoglobin 22.7 PG   22.0 PG 


 


Mean Corpuscular Hemoglobin


Concent 32.9 % 


  


  31.8 % 


 


 


Red Cell Distribution Width 19.4 %   18.6 % 


 


Platelet Count 946 TH/MM3   1085 TH/MM3 


 


Mean Platelet Volume 8.8 FL   8.4 FL 


 


Neutrophils (%) (Auto) 86.7 %   88.1 % 


 


Lymphocytes (%) (Auto) 4.3 %   3.4 % 


 


Monocytes (%) (Auto) 5.1 %   5.3 % 


 


Eosinophils (%) (Auto) 3.0 %   2.7 % 


 


Basophils (%) (Auto) 0.9 %   0.5 % 


 


Neutrophils # (Auto) 15.3 TH/MM3   21.4 TH/MM3 


 


Lymphocytes # (Auto) 0.8 TH/MM3   0.8 TH/MM3 


 


Monocytes # (Auto) 0.9 TH/MM3   1.3 TH/MM3 


 


Eosinophils # (Auto) 0.5 TH/MM3   0.7 TH/MM3 


 


Basophils # (Auto) 0.2 TH/MM3   0.1 TH/MM3 


 


CBC Comment DIFF FINAL   AUTO DIFF 


 


Differential Comment


   


  


  FINAL DIFF


MANUAL


 


Activated Partial


Thromboplast Time 


  30.2 SEC 


  


 


 


Potassium Level  3.5 MEQ/L  


 


Differential Total Cells


Counted 


  


  100 


 


 


Neutrophils % (Manual)   79 % 


 


Band Neutrophils %   6 % 


 


Lymphocytes %   2 % 


 


Monocytes %   8 % 


 


Eosinophils %   4 % 


 


Basophils %   1 % 


 


Neutrophils # (Manual)   20.6 TH/MM3 


 


Platelet Estimate   HIGH 


 


Platelet Morphology Comment   ENLARGED 


 


Ovalocytes   1+ 











Assessment and Plan


Problem List:  


(1) Peripheral vascular disease


ICD Codes:  I73.9 - Peripheral vascular disease, unspecified


Status:  Acute


(2) CAD (coronary artery disease)


ICD Codes:  I25.10 - Atherosclerotic heart disease of native coronary artery 

without angina pectoris


(3) Cardiomyopathy


ICD Codes:  I42.9 - Cardiomyopathy, unspecified


(4) Atrial fibrillation


ICD Codes:  I48.91 - Unspecified atrial fibrillation


(5) Hypertension


ICD Codes:  I10 - Essential (primary) hypertension


Assessment and Plan


No perioperative cardiac complications. Rhythm stable, no angina or CHF 

exacerbation. Continue ICU monitoring. Increase activity, PT.











Gonzalo Gerard MD Dec 26, 2017 19:50

## 2017-12-26 NOTE — HHI.PR
Subjective


Remarks


Follow up hypoxia, PAD. Patient states that she doesn't feel well today. Denies 

chest pain. Per nursing, she has a nonproductive cough.





Objective


Vitals





Vital Signs








  Date Time  Temp Pulse Resp B/P (MAP) Pulse Ox O2 Delivery O2 Flow Rate FiO2


 


12/26/17 07:57     95 Nasal Cannula 4.00 


 


12/26/17 07:00     94 Nasal Cannula 4.00 


 


12/26/17 07:00  88      


 


12/26/17 06:00  80      


 


12/26/17 04:00 98.1 85 13 114/69 (84) 95   


 


12/26/17 04:00  81      


 


12/26/17 02:00  79      


 


12/26/17 00:00  83      


 


12/26/17 00:00  83      


 


12/26/17 00:00 98.4 84 26 103/57 (72) 99   


 


12/25/17 23:00  95      


 


12/25/17 22:00  81      


 


12/25/17 20:00 98.0 94 26 102/58 (73) 99   


 


12/25/17 20:00  90      


 


12/25/17 20:00     95 Nasal Cannula 4.00 


 


12/25/17 19:56     95 Nasal Cannula 4.00 


 


12/25/17 18:00  92      


 


12/25/17 16:00 98.2 95 20 117/65 (82) 97   


 


12/25/17 16:00  93      


 


12/25/17 15:00  95      


 


12/25/17 14:00  93      


 


12/25/17 12:00  112      


 


12/25/17 12:00 98.6 112 23 107/62 (77) 94   


 


12/25/17 10:00  105      














I/O      


 


 12/25/17 12/25/17 12/25/17 12/26/17 12/26/17 12/26/17





 07:00 15:00 23:00 07:00 15:00 23:00


 


Intake Total   420 ml 100 ml  


 


Output Total 325 ml  325 ml 450 ml  


 


Balance -325 ml  95 ml -350 ml  


 


      


 


Intake Oral   320 ml   


 


IV Total   100 ml 100 ml  


 


Output Urine Total 325 ml  325 ml 450 ml  


 


# Bowel Movements 1  1 1  








Result Diagram:  


12/26/17 0342                                                                  

              12/25/17 0450





Imaging





Last Impressions








Chest X-Ray 12/25/17 0000 Signed





Impressions: 





 Service Date/Time:  Monday, December 25, 2017 15:44 - CONCLUSION:  No 





 significant change in bilateral interstitial opacities suggesting pulmonary 





 edema. Small bilateral pleural effusions.     Ebenezer Mariscal MD 


 


Myocardial Perfusion Scan Nuc Med 12/21/17 0000 Signed





Impressions: 





 Service Date/Time:  Thursday, December 21, 2017 11:26 - CONCLUSION:  Suspected 





 infarcts of the right coronary and circumflex global decreased perfusion fixed 





 at rest. No obvious ischemia.  RISK CATEGORY:      Intermediate (1-3%% Annual 





 Mortality Rate)      Dejuan Asencio MD 


 


Head CT 12/18/17 0000 Signed





Impressions: 





 Service Date/Time:  Monday, December 18, 2017 05:41 - CONCLUSION:  1. 

Bilateral 





 suspected areas of encephalomalacia being more prominent on the right. 2. 





 Suspected small vessel ischemic change throughout the white matter. 3. 

Atrophy. 





 4. Acute area of hemorrhage or mass effect is not seen.     Emir Sorensen MD 


 


Lower Extremity Ultrasound 12/17/17 0000 Signed





Impressions: 





 Service Date/Time:  Sunday, December 17, 2017 20:48 - CONCLUSION: No DVT.     





 Emir Sorensen MD 


 


Aorta w/Runoff CTA 12/17/17 0000 Signed





Impressions: 





 Service Date/Time:  Sunday, December 17, 2017 17:47 - CONCLUSION:  1. 





 Atherosclerotic changes seen throughout the arterial system, including 





 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 

prominent 





 mural thrombus. 2. Atherosclerotic change in the external iliac and common 





 femoral arteries bilaterally as described above.  3. Occlusion of the 





 superficial femoral arteries bilaterally with reconstitution distally.  The 





 reconstitution is higher on the left side.  4. Normal trifurcation vessels 

seen 





 on the left side. 5. Diminished flow seen at the right trifurcation vessels.  





 The vessels can only be well seen on the delayed images.  The vessels can not 

be 





 traced into the foot.           Emir Sorensen MD 








Objective Remarks


General: Elderly female in no acute distress.


Heart: Regular rate and rhythm. No murmur.


Lungs: Bilateral crackles noted. Breathing is nonlabored.


Abdomen: Soft, nontender, nondistended.


Extremities: Right leg with purplish discoloration. Decreased pulses in the 

right lower extremity.


Psych: Alert and oriented.


Procedures


12/22/17 exploration of the left groin, left common femoral and external iliac 

artery. Endarterectomy and patch angioplasty. External and common artery 

thromboembolectomy, superficial femoral popliteal artery thromboembolectomy, 

arteriogram.


Urinary Catheter:  Yes


Assessment to:  Continue


Arevalo insert reason:  Surgical/Invasive Proced


Vascular Central Line Catheter:  No





A/P


Assessment and Plan


1. Peripheral arterial disease: Status post surgical intervention on the left 

lower extremity with clinical improvement. Scheduled for right femoropopliteal 

bypass today. Appreciate vascular surgery management.


2. Cough, dyspnea: Continue antibiotic coverage of possible healthcare 

associated pneumonia. Continue supplemental oxygen, bronchodilators.


3. UTI: Urine culture shows Escherichia coli. Treated with Rocephin.


4. DVT prophylaxis: Heparin drip.


5. Deconditioning: Continue PT/OT. Will need SNF placement.


6. Leukocytosis: Possibly secondary to infection. WBCs trending down.


7. Hypokalemia: Recheck labs.











David Schafer MD Dec 26, 2017 09:16

## 2017-12-27 VITALS
SYSTOLIC BLOOD PRESSURE: 115 MMHG | DIASTOLIC BLOOD PRESSURE: 55 MMHG | RESPIRATION RATE: 16 BRPM | HEART RATE: 88 BPM | OXYGEN SATURATION: 96 %

## 2017-12-27 VITALS
RESPIRATION RATE: 16 BRPM | OXYGEN SATURATION: 90 % | HEART RATE: 100 BPM | DIASTOLIC BLOOD PRESSURE: 65 MMHG | SYSTOLIC BLOOD PRESSURE: 110 MMHG | TEMPERATURE: 97.5 F

## 2017-12-27 VITALS — HEART RATE: 85 BPM

## 2017-12-27 VITALS
RESPIRATION RATE: 20 BRPM | OXYGEN SATURATION: 94 % | TEMPERATURE: 97.9 F | SYSTOLIC BLOOD PRESSURE: 124 MMHG | DIASTOLIC BLOOD PRESSURE: 67 MMHG | HEART RATE: 91 BPM

## 2017-12-27 VITALS
DIASTOLIC BLOOD PRESSURE: 76 MMHG | SYSTOLIC BLOOD PRESSURE: 118 MMHG | HEART RATE: 85 BPM | RESPIRATION RATE: 14 BRPM | TEMPERATURE: 98.4 F | OXYGEN SATURATION: 96 %

## 2017-12-27 VITALS
HEART RATE: 82 BPM | SYSTOLIC BLOOD PRESSURE: 119 MMHG | OXYGEN SATURATION: 99 % | DIASTOLIC BLOOD PRESSURE: 55 MMHG | RESPIRATION RATE: 18 BRPM

## 2017-12-27 VITALS — OXYGEN SATURATION: 91 %

## 2017-12-27 VITALS — HEART RATE: 83 BPM

## 2017-12-27 VITALS — HEART RATE: 86 BPM

## 2017-12-27 VITALS — OXYGEN SATURATION: 95 %

## 2017-12-27 LAB
BASOPHILS # BLD AUTO: 0.1 TH/MM3 (ref 0–0.2)
BASOPHILS NFR BLD AUTO: 2 % (ref 0–2)
BASOPHILS NFR BLD: 0.4 % (ref 0–2)
BUN SERPL-MCNC: 6 MG/DL (ref 7–18)
BURR CELLS BLD QL SMEAR: (no result)
CALCIUM SERPL-MCNC: 8 MG/DL (ref 8.5–10.1)
CHLORIDE SERPL-SCNC: 104 MEQ/L (ref 98–107)
CREAT SERPL-MCNC: 0.54 MG/DL (ref 0.5–1)
EOSINOPHIL # BLD: 0.5 TH/MM3 (ref 0–0.4)
EOSINOPHIL NFR BLD: 2.5 % (ref 0–4)
ERYTHROCYTE [DISTWIDTH] IN BLOOD BY AUTOMATED COUNT: 18.8 % (ref 11.6–17.2)
GFR SERPLBLD BASED ON 1.73 SQ M-ARVRAT: 110 ML/MIN (ref 89–?)
GLUCOSE SERPL-MCNC: 89 MG/DL (ref 74–106)
HCO3 BLD-SCNC: 27.1 MEQ/L (ref 21–32)
HCT VFR BLD CALC: 39 % (ref 35–46)
HGB BLD-MCNC: 12.6 GM/DL (ref 11.6–15.3)
LYMPHOCYTES # BLD AUTO: 0.8 TH/MM3 (ref 1–4.8)
LYMPHOCYTES NFR BLD AUTO: 4.5 % (ref 9–44)
LYMPHOCYTES: 3 % (ref 9–44)
MAGNESIUM SERPL-MCNC: 2.1 MG/DL (ref 1.5–2.5)
MCH RBC QN AUTO: 22.4 PG (ref 27–34)
MCHC RBC AUTO-ENTMCNC: 32.3 % (ref 32–36)
MCV RBC AUTO: 69.3 FL (ref 80–100)
MONOCYTE #: 0.8 TH/MM3 (ref 0–0.9)
MONOCYTES NFR BLD: 4.4 % (ref 0–8)
MONOCYTES: 3 % (ref 0–8)
MYELOCYTES NFR BLD: 2 % (ref 0–0)
NEUTROPHILS # BLD AUTO: 15.9 TH/MM3 (ref 1.8–7.7)
NEUTROPHILS NFR BLD AUTO: 88.2 % (ref 16–70)
NEUTS BAND # BLD MANUAL: 15.8 TH/MM3 (ref 1.8–7.7)
NEUTS BAND NFR BLD: 5 % (ref 0–6)
NEUTS SEG NFR BLD MANUAL: 81 % (ref 16–70)
OVALOCYTES BLD QL SMEAR: (no result)
PLATELET # BLD: 866 TH/MM3 (ref 150–450)
PMV BLD AUTO: 8.4 FL (ref 7–11)
RBC # BLD AUTO: 5.62 MIL/MM3 (ref 4–5.3)
SCHISTOCYTES BLD QL SMEAR: (no result)
SODIUM SERPL-SCNC: 138 MEQ/L (ref 136–145)
WBC # BLD AUTO: 18 TH/MM3 (ref 4–11)

## 2017-12-27 RX ADMIN — METOPROLOL TARTRATE SCH MG: 25 TABLET, FILM COATED ORAL at 20:02

## 2017-12-27 RX ADMIN — TAZOBACTAM SODIUM AND PIPERACILLIN SODIUM SCH MLS/HR: 500; 4 INJECTION, SOLUTION INTRAVENOUS at 20:02

## 2017-12-27 RX ADMIN — Medication SCH ML: at 19:58

## 2017-12-27 RX ADMIN — ACETAMINOPHEN PRN MG: 325 TABLET ORAL at 16:13

## 2017-12-27 RX ADMIN — SODIUM CHLORIDE SCH MLS/HR: 900 INJECTION INTRAVENOUS at 22:55

## 2017-12-27 RX ADMIN — TAZOBACTAM SODIUM AND PIPERACILLIN SODIUM SCH MLS/HR: 500; 4 INJECTION, SOLUTION INTRAVENOUS at 14:37

## 2017-12-27 RX ADMIN — IPRATROPIUM BROMIDE AND ALBUTEROL SULFATE SCH AMPULE: .5; 3 SOLUTION RESPIRATORY (INHALATION) at 15:23

## 2017-12-27 RX ADMIN — IPRATROPIUM BROMIDE AND ALBUTEROL SULFATE SCH AMPULE: .5; 3 SOLUTION RESPIRATORY (INHALATION) at 12:00

## 2017-12-27 RX ADMIN — OXYTOCIN SCH MLS/HR: 10 INJECTION, SOLUTION INTRAMUSCULAR; INTRAVENOUS at 21:19

## 2017-12-27 RX ADMIN — IPRATROPIUM BROMIDE AND ALBUTEROL SULFATE SCH AMPULE: .5; 3 SOLUTION RESPIRATORY (INHALATION) at 07:53

## 2017-12-27 RX ADMIN — METOPROLOL TARTRATE SCH MG: 25 TABLET, FILM COATED ORAL at 09:13

## 2017-12-27 RX ADMIN — DIGOXIN SCH MG: 125 TABLET ORAL at 09:13

## 2017-12-27 RX ADMIN — ACETAMINOPHEN PRN MG: 325 TABLET ORAL at 20:01

## 2017-12-27 RX ADMIN — TAZOBACTAM SODIUM AND PIPERACILLIN SODIUM SCH MLS/HR: 500; 4 INJECTION, SOLUTION INTRAVENOUS at 09:12

## 2017-12-27 RX ADMIN — IPRATROPIUM BROMIDE AND ALBUTEROL SULFATE SCH AMPULE: .5; 3 SOLUTION RESPIRATORY (INHALATION) at 19:12

## 2017-12-27 RX ADMIN — SODIUM CHLORIDE SCH MLS/HR: 900 INJECTION INTRAVENOUS at 04:24

## 2017-12-27 RX ADMIN — STANDARDIZED SENNA CONCENTRATE AND DOCUSATE SODIUM SCH TAB: 8.6; 5 TABLET, FILM COATED ORAL at 09:13

## 2017-12-27 RX ADMIN — Medication SCH ML: at 09:00

## 2017-12-27 RX ADMIN — STANDARDIZED SENNA CONCENTRATE AND DOCUSATE SODIUM SCH TAB: 8.6; 5 TABLET, FILM COATED ORAL at 19:58

## 2017-12-27 RX ADMIN — TAZOBACTAM SODIUM AND PIPERACILLIN SODIUM SCH MLS/HR: 500; 4 INJECTION, SOLUTION INTRAVENOUS at 02:57

## 2017-12-27 RX ADMIN — CLOPIDOGREL BISULFATE SCH MG: 75 TABLET, FILM COATED ORAL at 09:13

## 2017-12-27 NOTE — HHI.HCPN
Reason for visit


   a.  To assist with evaluation and management of symptoms including: right 

lower extremity pain; confusion; generalized weakness


   b.  To assist medical decision maker(s) with: better understanding of 

current medical conditions; weighing benefits/burdens of medical treatment 

options; making medical treatment decisions.


.





Subjective/Interval History


Follow up visit for symptom management and clarification of medical treatment 

goals.  





Patient remains somewhat confused. Oriented to person and place but did not 

know the date, month year. Patient told me she was from [place] originally. 

She states she has 6 children but does not no wear any of them are other than 

her daughter, Mariam. Patient reportedly lives with her daughter (Mariam), but 

she has been unreachable. Accurints report was requested on 12/19/17 for 

daughter, Mariam Jameson. [facility] did  a wellness 

check but they were unable to locate the patient's daughter. Case management 

has been requested to rerun an accurints for the patient's daughter under the 

name Mariam Cormier. Patient becomes tearful and appears distressed when 

discussing her daughter; she stated she did not want her daughter making 

decisions for her and she was not sure that she would want to live with her 

after rehabilitation.





Afebrile.  WBC elevated at 18.0.  Follow-up chest x-ray on 12/25/2017 showed no 

significant change in bilateral interstitial opacities suggesting pulmonary 

edema, small bilateral pleural effusions.  Urine culture growing Candida.





Patient reporting moderate to severe pain in her right lower extremity. She is 

unable to use descriptive words to describe pain; pain is worse with movement 

and touch.  Physical therapy reporting patient is painful with passive ROM but 

is not painful to bear weight. Patient currently has no medications available 

for pain management.  Physical therapy and occupational therapy continue to 

follow; she has been accepted for admission to [facility] for rehabilitation 

upon discharge.


.





Advance Directives


Living Will:  Never completed


Health Care Surrogate:  Never completed


Durable Power of :  Never completed


Advance Directive Specifics


Date completed:


According to the patient, she has never completed an advanced directive.


.


Health Care Surrogate(s):


According to patient, she has never designated in writing a health care 

surrogate.


.


Documented care wishes:


Per the patient, she has no written documentation of her healthcare preferences/

goals/wishes.


.





Objective





Vital Signs








  Date Time  Temp Pulse Resp B/P (MAP) Pulse Ox O2 Delivery O2 Flow Rate FiO2


 


12/27/17 15:25     91   21


 


12/27/17 10:00  83      


 


12/27/17 08:00 98.4 85 14 118/76 (90) 96   


 


12/27/17 08:00  82      


 


12/27/17 07:53     95 Nasal Cannula 3.00 


 


12/27/17 07:00     97 Room Air  


 


12/27/17 06:00  85      


 


12/27/17 04:00  82 18 119/55 (76) 99   


 


12/27/17 04:00  82      


 


12/27/17 02:00  86      


 


12/27/17 00:00  88      


 


12/27/17 00:00  88 16 115/55 (75) 96   


 


12/26/17 23:00  86      


 


12/26/17 22:00  90      


 


12/26/17 20:32     96 Nasal Cannula 3.00 


 


12/26/17 20:00 97.5 82 21 114/70 (85) 96   


 


12/26/17 20:00  77      


 


12/26/17 19:00     96 Nasal Cannula 3.00 


 


12/26/17 18:00  73      














Intake & Output  


 


 12/27/17 12/27/17





 07:00 19:00


 


Intake Total 902 ml 


 


Output Total 345 ml 


 


Balance 557 ml 


 


  


 


Intake Oral 100 ml 


 


IV Total 802 ml 


 


Output Urine Total 300 ml 


 


Drainage Total 45 ml 


 


# Bowel Movements 0 





.


Physical Exam


CONSTITUTIONAL/GENERAL: This is a thin, pale, elderly female who is in no 

apparent distress.


TUBES/LINES/DRAINS: Arevalo catheter; peripheral IV


SKIN: No jaundice, rashes, or lesions.  Ecchymosis to bilateral upper 

extremities.  Ischemic changes in the right foot.  No wounds seen anteriorly. 

Skin temperature appropriate. Not diaphoretic. 


HEAD: Atraumatic. Normocephalic.


EYES: Pupils equal and round and reactive. Extraocular motions intact. No 

scleral icterus. No injection or drainage. Fundi not examined.


ENT: Hearing grossly normal. Nose without bleeding or purulent drainage. 


NECK: Trachea midline. Supple, nontender. No palpable thyroid enlargement or 

nodularity. 


CARDIOVASCULAR: Regular rate and rhythm.  No murmurs, gallops, or rubs. No JVD.

  


RESPIRATORY/CHEST: Symmetric, unlabored respirations. Breath sounds equal 

bilaterally but diminished throughout. No wheezes, rales, or rhonchi.  


GASTROINTESTINAL: Abdomen soft, non-tender, nondistended. No hepato-splenomegaly

, or palpable masses. No guarding. Bowel sounds present.


GENITOURINARY: Without palpable bladder distension. Catheter in place.


MUSCULOSKELETAL: Trace edema in bilateral lower extremities.    


LYMPHATICS: No palpable cervical or supraclavicular adenopathy.


NEUROLOGICAL: Awake and alert. Motor and sensory grossly within normal limits. 

Follows commands.  Disoriented to time.  Confused.  Moves all extremities.


PSYCHIATRIC: Tearful.  No apparent hallucinations or other psychotic thought 

process.


.





Diagnostic Tests


Laboratory





Laboratory Tests








Test


  12/24/17


19:20 12/25/17


04:50 12/25/17


11:15 12/25/17


14:00


 


Blood Urea Nitrogen 7 MG/DL (7-18)  8 MG/DL (7-18)   


 


Creatinine


  0.92 MG/DL


(0.50-1.00) 0.67 MG/DL


(0.50-1.00) 


  


 


 


Random Glucose


  176 MG/DL


() 107 MG/DL


() 


  


 


 


Calcium Level


  8.4 MG/DL


(8.5-10.1) 8.5 MG/DL


(8.5-10.1) 


  


 


 


Sodium Level


  135 MEQ/L


(136-145) 137 MEQ/L


(136-145) 


  


 


 


Potassium Level


  3.5 MEQ/L


(3.5-5.1) 3.3 MEQ/L


(3.5-5.1) 


  


 


 


Chloride Level


  99 MEQ/L


() 99 MEQ/L


() 


  


 


 


Carbon Dioxide Level


  23.6 MEQ/L


(21.0-32.0) 25.2 MEQ/L


(21.0-32.0) 


  


 


 


Anion Gap


  12 MEQ/L


(5-15) 13 MEQ/L


(5-15) 


  


 


 


Estimat Glomerular Filtration


Rate 59 ML/MIN


(>89) 86 ML/MIN


(>89) 


  


 


 


B-Type Natriuretic Peptide


  1730 PG/ML


(0-100) 


  


  


 


 


White Blood Count


  


  26.8 TH/MM3


(4.0-11.0) 


  


 


 


Red Blood Count


  


  7.04 MIL/MM3


(4.00-5.30) 


  


 


 


Hemoglobin


  


  15.5 GM/DL


(11.6-15.3) 


  


 


 


Hematocrit


  


  47.9 %


(35.0-46.0) 


  


 


 


Mean Corpuscular Volume


  


  68.1 FL


(80.0-100.0) 


  


 


 


Mean Corpuscular Hemoglobin


  


  22.0 PG


(27.0-34.0) 


  


 


 


Mean Corpuscular Hemoglobin


Concent 


  32.3 %


(32.0-36.0) 


  


 


 


Red Cell Distribution Width


  


  19.0 %


(11.6-17.2) 


  


 


 


Platelet Count


  


  956 TH/MM3


(150-450) 


  


 


 


Mean Platelet Volume


  


  8.7 FL


(7.0-11.0) 


  


 


 


Neutrophils (%) (Auto)


  


  91.4 %


(16.0-70.0) 


  


 


 


Lymphocytes (%) (Auto)


  


  3.0 %


(9.0-44.0) 


  


 


 


Monocytes (%) (Auto)


  


  4.3 %


(0.0-8.0) 


  


 


 


Eosinophils (%) (Auto)


  


  1.0 %


(0.0-4.0) 


  


 


 


Basophils (%) (Auto)


  


  0.3 %


(0.0-2.0) 


  


 


 


Neutrophils # (Auto)


  


  24.5 TH/MM3


(1.8-7.7) 


  


 


 


Lymphocytes # (Auto)


  


  0.8 TH/MM3


(1.0-4.8) 


  


 


 


Monocytes # (Auto)


  


  1.1 TH/MM3


(0-0.9) 


  


 


 


Eosinophils # (Auto)


  


  0.3 TH/MM3


(0-0.4) 


  


 


 


Basophils # (Auto)


  


  0.1 TH/MM3


(0-0.2) 


  


 


 


CBC Comment  AUTO DIFF   


 


Differential Total Cells


Counted 


  100 


  


  


 


 


Neutrophils % (Manual)  85 % (16-70)   


 


Band Neutrophils %  11 % (0-6)   


 


Lymphocytes %  1 % (9-44)   


 


Monocytes %  2 % (0-8)   


 


Basophils %  1 % (0-2)   


 


Neutrophils # (Manual)


  


  25.7 TH/MM3


(1.8-7.7) 


  


 


 


Differential Comment


  


  FINAL DIFF


MANUAL 


  


 


 


Toxic Vacuolation


  


  PRESENT (NONE


SEEN) 


  


 


 


Platelet Estimate  HIGH (NORMAL)   


 


Platelet Morphology Comment


  


  ENLARGED


(NORMAL) 


  


 


 


Ovalocytes  1+ (NORMAL)   


 


Activated Partial


Thromboplast Time 


  


  60.9 SEC


(24.3-30.1) 


 


 


Blood Gas Puncture Site    RT RADIAL 


 


Blood Gas Patient Temperature    98.6 


 


Blood Gas HCO3


  


  


  


  27 mmol/L


(22-26)


 


Blood Gas Base Excess


  


  


  


  3.3 mmol/L


(-2-2)


 


Blood Gas Oxygen Saturation    94 % () 


 


Arterial Blood pH


  


  


  


  7.50


(7.380-7.420)


 


Arterial Blood Partial


Pressure CO2 


  


  


  35 mmHg


(38-42)


 


Arterial Blood Partial


Pressure O2 


  


  


  81 mmHg


()


 


Arterial Blood Oxygen Content


  


  


  


  20.4 Vol %


(12.0-20.0)


 


Arterial Blood


Carboxyhemoglobin 


  


  


  1.4 % (0-4) 


 


 


Arterial Blood Methemoglobin    0.9 % (0-2) 


 


Blood Gas Hemoglobin


  


  


  


  15.4 G/DL


(12.0-16.0)


 


Oxygen Delivery Device    NASAL CANNULA 


 


Blood Gas Liter Flow    4 L/M 


 


Test


  12/25/17


16:45 12/25/17


18:45 12/26/17


03:42 12/26/17


09:16


 


Lactic Acid Level


  1.5 mmol/L


(0.4-2.0) 


  


  


 


 


Urine Color


  


  YELLOW


(YELLW/STRAW) 


  


 


 


Urine Turbidity  HAZY (CLEAR)   


 


Urine pH  6.0 (5.0-8.5)   


 


Urine Specific Gravity


  


  1.012


(1.002-1.035) 


  


 


 


Urine Protein


  


  TRACE mg/dL


(NEG-TRACE) 


  


 


 


Urine Glucose (UA)


  


  NEG mg/dL


(NEG) 


  


 


 


Urine Ketones


  


  NEG mg/dL


(NEG) 


  


 


 


Urine Occult Blood  TRACE (NEG)   


 


Urine Nitrite  NEG (NEG)   


 


Urine Bilirubin  NEG (NEG)   


 


Urine Urobilinogen


  


  LESS THAN 2.0


MG/DL (LESS 


  


 


 


Urine Leukocyte Esterase  LARGE (NEG)   


 


Urine RBC  22 /hpf (0-3)   


 


Urine WBC  182 /hpf (0-5)   


 


Urine WBC Clumps  RARE (NONE)   


 


Urine Squamous Epithelial


Cells 


  <1 /hpf (0-5) 


  


  


 


 


Urine Transitional Epithelial


Cells 


  <1 /hpf (NONE) 


  


  


 


 


Urine Bacteria


  


  OCC /hpf


(NONE) 


  


 


 


Urine Mucus  FEW /lpf (OCC)   


 


Urine Yeast with Hyphae  FEW (NONE)   


 


Urine Yeast (Budding)  MANY (NONE)   


 


Microscopic Urinalysis Comment


  


  CULTURE


INDICATED 


  


 


 


White Blood Count


  


  


  17.7 TH/MM3


(4.0-11.0) 


 


 


Red Blood Count


  


  


  6.11 MIL/MM3


(4.00-5.30) 


 


 


Hemoglobin


  


  


  13.9 GM/DL


(11.6-15.3) 


 


 


Hematocrit


  


  


  42.2 %


(35.0-46.0) 


 


 


Mean Corpuscular Volume


  


  


  69.1 FL


(80.0-100.0) 


 


 


Mean Corpuscular Hemoglobin


  


  


  22.7 PG


(27.0-34.0) 


 


 


Mean Corpuscular Hemoglobin


Concent 


  


  32.9 %


(32.0-36.0) 


 


 


Red Cell Distribution Width


  


  


  19.4 %


(11.6-17.2) 


 


 


Platelet Count


  


  


  946 TH/MM3


(150-450) 


 


 


Mean Platelet Volume


  


  


  8.8 FL


(7.0-11.0) 


 


 


Neutrophils (%) (Auto)


  


  


  86.7 %


(16.0-70.0) 


 


 


Lymphocytes (%) (Auto)


  


  


  4.3 %


(9.0-44.0) 


 


 


Monocytes (%) (Auto)


  


  


  5.1 %


(0.0-8.0) 


 


 


Eosinophils (%) (Auto)


  


  


  3.0 %


(0.0-4.0) 


 


 


Basophils (%) (Auto)


  


  


  0.9 %


(0.0-2.0) 


 


 


Neutrophils # (Auto)


  


  


  15.3 TH/MM3


(1.8-7.7) 


 


 


Lymphocytes # (Auto)


  


  


  0.8 TH/MM3


(1.0-4.8) 


 


 


Monocytes # (Auto)


  


  


  0.9 TH/MM3


(0-0.9) 


 


 


Eosinophils # (Auto)


  


  


  0.5 TH/MM3


(0-0.4) 


 


 


Basophils # (Auto)


  


  


  0.2 TH/MM3


(0-0.2) 


 


 


CBC Comment   DIFF FINAL  


 


Differential Comment     


 


Activated Partial


Thromboplast Time 


  


  


  30.2 SEC


(24.3-30.1)


 


Potassium Level


  


  


  


  3.5 MEQ/L


(3.5-5.1)


 


Test


  12/26/17


15:36 12/27/17


05:29 


  


 


 


White Blood Count


  24.2 TH/MM3


(4.0-11.0) 18.0 TH/MM3


(4.0-11.0) 


  


 


 


Red Blood Count


  6.22 MIL/MM3


(4.00-5.30) 5.62 MIL/MM3


(4.00-5.30) 


  


 


 


Hemoglobin


  13.7 GM/DL


(11.6-15.3) 12.6 GM/DL


(11.6-15.3) 


  


 


 


Hematocrit


  43.0 %


(35.0-46.0) 39.0 %


(35.0-46.0) 


  


 


 


Mean Corpuscular Volume


  69.2 FL


(80.0-100.0) 69.3 FL


(80.0-100.0) 


  


 


 


Mean Corpuscular Hemoglobin


  22.0 PG


(27.0-34.0) 22.4 PG


(27.0-34.0) 


  


 


 


Mean Corpuscular Hemoglobin


Concent 31.8 %


(32.0-36.0) 32.3 %


(32.0-36.0) 


  


 


 


Red Cell Distribution Width


  18.6 %


(11.6-17.2) 18.8 %


(11.6-17.2) 


  


 


 


Platelet Count


  1085 TH/MM3


(150-450) 866 TH/MM3


(150-450) 


  


 


 


Mean Platelet Volume


  8.4 FL


(7.0-11.0) 8.4 FL


(7.0-11.0) 


  


 


 


Neutrophils (%) (Auto)


  88.1 %


(16.0-70.0) 88.2 %


(16.0-70.0) 


  


 


 


Lymphocytes (%) (Auto)


  3.4 %


(9.0-44.0) 4.5 %


(9.0-44.0) 


  


 


 


Monocytes (%) (Auto)


  5.3 %


(0.0-8.0) 4.4 %


(0.0-8.0) 


  


 


 


Eosinophils (%) (Auto)


  2.7 %


(0.0-4.0) 2.5 %


(0.0-4.0) 


  


 


 


Basophils (%) (Auto)


  0.5 %


(0.0-2.0) 0.4 %


(0.0-2.0) 


  


 


 


Neutrophils # (Auto)


  21.4 TH/MM3


(1.8-7.7) 15.9 TH/MM3


(1.8-7.7) 


  


 


 


Lymphocytes # (Auto)


  0.8 TH/MM3


(1.0-4.8) 0.8 TH/MM3


(1.0-4.8) 


  


 


 


Monocytes # (Auto)


  1.3 TH/MM3


(0-0.9) 0.8 TH/MM3


(0-0.9) 


  


 


 


Eosinophils # (Auto)


  0.7 TH/MM3


(0-0.4) 0.5 TH/MM3


(0-0.4) 


  


 


 


Basophils # (Auto)


  0.1 TH/MM3


(0-0.2) 0.1 TH/MM3


(0-0.2) 


  


 


 


CBC Comment AUTO DIFF  AUTO DIFF   


 


Differential Total Cells


Counted 100 


  100 


  


  


 


 


Neutrophils % (Manual) 79 % (16-70)  81 % (16-70)   


 


Band Neutrophils % 6 % (0-6)  5 % (0-6)   


 


Lymphocytes % 2 % (9-44)  3 % (9-44)   


 


Monocytes % 8 % (0-8)  3 % (0-8)   


 


Eosinophils % 4 % (0-4)  4 % (0-4)   


 


Basophils % 1 % (0-2)  2 % (0-2)   


 


Neutrophils # (Manual)


  20.6 TH/MM3


(1.8-7.7) 15.8 TH/MM3


(1.8-7.7) 


  


 


 


Differential Comment


  FINAL DIFF


MANUAL FINAL DIFF


MANUAL 


  


 


 


Platelet Estimate HIGH (NORMAL)  HIGH (NORMAL)   


 


Platelet Morphology Comment


  ENLARGED


(NORMAL) ENLARGED


(NORMAL) 


  


 


 


Ovalocytes 1+ (NORMAL)  1+ (NORMAL)   


 


Myelocytes  2 % (0-0)   


 


Atypical Lymphocytes   % (0-0)   


 


[place] Cells  1+ (NORMAL)   


 


Keratocytes  OCC (NORMAL)   


 


Blood Urea Nitrogen  6 MG/DL (7-18)   


 


Creatinine


  


  0.54 MG/DL


(0.50-1.00) 


  


 


 


Random Glucose


  


  89 MG/DL


() 


  


 


 


Calcium Level


  


  8.0 MG/DL


(8.5-10.1) 


  


 


 


Magnesium Level


  


  2.1 MG/DL


(1.5-2.5) 


  


 


 


Sodium Level


  


  138 MEQ/L


(136-145) 


  


 


 


Potassium Level


  


  3.4 MEQ/L


(3.5-5.1) 


  


 


 


Chloride Level


  


  104 MEQ/L


() 


  


 


 


Carbon Dioxide Level


  


  27.1 MEQ/L


(21.0-32.0) 


  


 


 


Anion Gap  7 MEQ/L (5-15)   


 


Estimat Glomerular Filtration


Rate 


  110 ML/MIN


(>89) 


  


 





.


Result Diagram:  


12/27/17 0529                                                                  

              12/27/17 0529





Microbiology





Microbiology








 Date/Time


Source Procedure


Growth Status


 


 


 12/25/17 16:45


Blood Peripheral Aerobic Blood Culture - Preliminary


NO GROWTH IN 2 DAYS Resulted


 


 12/25/17 16:45


Blood Peripheral Anaerobic Blood Culture - Preliminary


NO GROWTH IN 2 DAYS Resulted





 12/25/17 16:40


Blood Peripheral Aerobic Blood Culture - Preliminary


NO GROWTH IN 2 DAYS Resulted


 


 12/25/17 16:40


Blood Peripheral Anaerobic Blood Culture - Preliminary


NO GROWTH IN 2 DAYS Resulted


 


 12/25/17 18:45


Urine Clean Catch Urine Culture - Final


Candida Albicans Complete





 12/25/17 18:45


Urine Clean Catch Legionella Antigen - Final


PRESUMPTIVE NEGATIVE FOR LEGIONELLA P... Complete


 


 12/25/17 18:45


Urine Clean Catch Streptococcus pneumoniae Antigen (M - Final


PRESUMPTIVE NEGATIVE FOR STREPTOCOCCU... Complete





.


Imaging





Last 72 hours Impressions








Chest X-Ray 12/25/17 0000 Signed





Impressions: 





 Service Date/Time:  Monday, December 25, 2017 15:44 - CONCLUSION:  No 





 significant change in bilateral interstitial opacities suggesting pulmonary 





 edema. Small bilateral pleural effusions.     Ebenezer Mariscal MD 





.





Assessment and Plan


Disease Oriented Problem List:  


(1) Sepsis


(2) UTI (urinary tract infection)


(3) Peripheral vascular disease


(4) Microcytosis


(5) Polycythemia


(6) Thrombocytosis


(7) Hypoalbuminemia


(8) Elevated brain natriuretic peptide (BNP) level


(9) Stroke


Symptom Scale:  


(1) Pain


0-10 Scale:  Unable to quantify


Comment:  See assessment





(2) Dyspnea


0-10 Scale:  Unable to quantify


Comment:  See assessment





(3) Generalized weakness


0-10 Scale:  Unable to quantify


Comment:  See assessment





Pertinent Non-Medical Issues


Psychosocial:  Reportedly lives with daughter in crowded, cluttered apartment. 

6 children altogether but patient can't tell me what state they are in.


Spiritual:  Mu-ism. Not particularly interested in chaplaincy visits.


Legal: No known advance directives. 


Ethical issues impacting care: Patient's capacity is questionable. Recommend at 

least shared decision making until she is cognitively clearer or we see that 

goals/preferences are consistent. 


.


Important Contacts


Mariam Jameson (daughter)  :   ? phone number


.


Prognosis


It is unclear to what extent patient's current status is due to her peripheral 

vascular disease and to what extent there are other factors. Some of her 

confusion be due to vascular dementia (she is post stroke).  Patient has been 

accepted to [facility] for rehab s/p vascular surgery. She risk for 

continued complications/setbacks.


Code Status:  Full Code


Plan


==  Decision making:  Patient still does not have capacity to make medical 

decisions.


==  Code Status:  FULL CODE -- 


==  AGGRESSIVE goals





==  Symptoms:


* Right lower extremity pain:  Patient reporting moderate to severe pain in her 

right lower extremity. She is unable to use descriptive words to describe pain; 

pain is worse with movement and touch.  Physical therapy reporting patient is 

painful with passive ROM but is not painful to bear weight.


* Generalized weakness:  Both weakness and right lower extremity pain seemed to 

make her bedbound in the days leading up to hospital admission.  She still has 

some residual weakness from her stroke in 2012. Physical therapy and 

occupational therapy continue to follow; she has been accepted for admission to 

[facility] for rehabilitation upon discharge.


* Confusion: Unclear to what extent this is acute due to her clinical condition 

or is more chronic.  12/27/2017: Patient remains somewhat confused. Oriented to 

person and place but did not know the date, month year. 





== Patient remains somewhat confused. Oriented to person and place but did not 

know the date, month year. Patient told me she was from [place] originally. 

She states she has 6 children but does not no wear any of them are other than 

her daughter, Mariam. Patient reportedly lives with her daughter (Mariam), but 

she has been unreachable. Accurints report was requested on 12/19/17 for 

daughter, Mariam Jameson. [facility] Department did  a wellness 

check but they were unable to locate the patient's daughter. Case management 

has been requested to rerun an accurints for the patient's daughter under the 

name Mariam Cormier. Patient becomes tearful and appears distressed when 

discussing her daughter; she stated she did not want her daughter making 

decisions for her and she was not sure that she would want to live with her 

after rehabilitation.





==  If patient becomes cognitively clear, we will try and get her to complete 

health care surrogate forms and we will re-visit resuscitation wishes.  If she 

doesn't clear, we need to identify the appropriate proxy or proxies and speak 

to them about resuscitation status and goals.





==  Palliative care will continue to follow to assist with symptom management 

and to further clarify goals of medical treatment as the clinical course 

evolves.  Discussed with patient's nurse and Dr. Schafer.





Attestation


To help prompt me to consider important information that might be impacting 

today's encounter and assessment, information from prior notes written by 

myself or my colleagues may have been "brought forward" into today's note.  My 

signature on this note, however, is an attestation that I personally performed 

the exam, history, and/or decision-making noted today, and, unless otherwise 

indicated, the interactions with patient, family, and staff as well as the 

review of records all occurred today.  I also attest that the listed assessment 

and stated plan reflect my best clinical judgment today based on the 

combination of historical information, prior notes, and today's exam/ 

interactions.  When time spent is documented, it refers only to time spent 

today by the signer, or if indicated, combined time spent today by 

collaborating physician/nurse practitioner.


.











Ruchi Tucker Dec 27, 2017 16:53

## 2017-12-27 NOTE — PD.CAR.PN
CVT Progress Note


Subjective/Hospital Course:


Referral received


Full consult to follow


Severe for peripheral vascular disease will require reconstruction


Valentin PEREZ


12/19/17


As noted in my consultation this patient has bilateral severe peripheral 

vascular disease


On top of that she is being treated for urosepsis and general decline


Patient has not been in the hospital for 3 days and has gradually improved for 

her urosepsis based on clinical exam level of alertness and diagnostic studies


Despite heparin drip the right leg is looking worse and worse and patient now 

has ischemia that needs attention in the resolution


In the best of worlds I would like to wait another while to do the surgery 

considering patient's other issues but that this point if surgery is not 

performed patient is a very high risk of losing her leg


I have reviewed patient's echocardiogram and studies and she is at this point 

moderate risk for vascular surgery but I believe options a limited and we have 

to go ahead with it.


Therefore patient will be scheduled for common femoral endarterectomy and 

femoropopliteal bypass tomorrow


I explained the risks and benefits of the procedure and all patient is very 

quiet she is clearly awake and alert and understands the discussion


Have tried to reach her daughter however number is apparently disconnected 


12/2017


Patient originally scheduled for surgery today however EKG reveals some 

anterior ischemia in addition to inferior ischemia that was noted on previous 

EKG


In discussion with the anesthesiologist the decision is made to postpone the 

surgery until patient can be worked up cardiac wise in more detail so we get a 

better picture on cardiac risk and possible remedy


Right leg and foot are ischemic and the cyanotic however while this surgery is 

urgent it is not an emergent and cardiac issues trump any other issue right now


We'll consult cardiology for full evaluation possible stress test


Restart heparin


All things equal once cardiac workup completed we'll proceed with vascular 

bypass and reconstruction on the right leg


12/21/17


Right leg and foot remain ischemic and cyanotic


Discussed with Dr. Pastor


This patient will lose her leg if for no surgery is done in next few days.  On 

the other hand patient has significant coronary artery disease and major other 

comorbidities and hence the high risk


Dr. Pastor we'll proceed with cardiac catheterization through the left leg and 

based on that will decide which way to go


There is of course a good chance the patient will be candidate for any surgery 

and that his then the rendering but we should make every effort to make patient 

at least be able to sustain a limb saving operation.


On the other hand limb saving operation has not much value if patient loses her 

life in the process and therefore every effort should be made to get patient in 

shape good enough to tolerate surgery


12/23/17


Patient status post revascularization of the left leg


Incision is clean and dry


Patient has warm foot with normal capillary refill and strong dopplerable 

popliteal, dorsalis pedis posterior tibial pulses


Right leg is still cyanotic and imminently at risk


We'll give patient another day and address this tomorrow.


Most likely all patient needs is iliofemoral endarterectomy and patch and 

during the surgery we will see if she needs a femoropopliteal bypass in 

addition but I would like to minimize the amount of surgery on this unfortunate 

lady


Transfer to floor today


12/24/17


Patient doing well at this time she is more alert and awake and conversing much 

better than she did in last few days


Left femoral strong palpable pulse strong dopplerable popliteal dissolves pedis 

and posterior tibial pulses on the left


Will give her few days respite between the general anesthesia sessions


Patient is to undergo right femoral endarterectomy and possible bypass on 

Tuesday


After the surgery patient will probably need some nursing home/rehabilitation 

placement because obviously patient cannot go home and take care of herself


12/25/17


Patient doing okay at this time


She is more alert and awake and answers questions appropriately and matter-of-

fact asking questions about her legs


Left leg is well-perfused incision is clean and dry


Right leg on the other hand is cyanotic and purple as yesterday


For right iliofemoral endarterectomy and possible femoropopliteal bypass 

tomorrow


This lady is fairly significant risk patient however in absence of surgery she 

will lose right leg and chance of this is 100%


12/27/17


Patient doing very well


Status post right external iliac and common femoral endarterectomy and 

femoropopliteal bypass with a patch graft


Incisions are clean and dry and patient is excellent flow to the foot with 

strong dopplerable pulses


Both feet are warm and well perfused at this time


We will leave LOIS in for another day


Transfer patient to floor


Aggressive physical therapy out of bed


Nothing to add to care from my point


Objective:





Vital Signs








  Date Time  Temp Pulse Resp B/P (MAP) Pulse Ox O2 Delivery O2 Flow Rate FiO2


 


12/27/17 07:53     95 Nasal Cannula 3.00 


 


12/27/17 06:00  85      


 


12/27/17 04:00  82 18 119/55 (76) 99   


 


12/27/17 04:00  82      


 


12/27/17 02:00  86      


 


12/27/17 00:00  88      


 


12/27/17 00:00  88 16 115/55 (75) 96   


 


12/26/17 23:00  86      


 


12/26/17 22:00  90      


 


12/26/17 20:32     96 Nasal Cannula 3.00 


 


12/26/17 20:00 97.5 82 21 114/70 (85) 96   


 


12/26/17 20:00  77      


 


12/26/17 19:00     96 Nasal Cannula 3.00 


 


12/26/17 18:00  73      


 


12/26/17 16:15 97.6 81 20 127/63 (84) 99 Nasal Cannula 2 


 


12/26/17 16:00 97.6 75 12 112/59 (76) 91   





    115/48 (70)    


 


12/26/17 16:00  75      


 


12/26/17 16:00  81 20 129/61 (83) 100 Nasal Cannula 2 


 


12/26/17 15:45  79 20 111/56 (74) 99 Nasal Cannula 2 


 


12/26/17 15:30  80 20 124/66 (85) 96 Nasal Cannula 2 


 


12/26/17 15:15 97.6 80 20 127/67 (87) 95 Nasal Cannula 2 


 


12/26/17 11:31 98.3 80 17 133/72 (92) 95   


 


12/26/17 11:29     95 Nasal Cannula 2 








Labs:





Laboratory Tests








Test


  12/27/17


05:29


 


White Blood Count


  18.0 TH/MM3


(4.0-11.0)


 


Red Blood Count


  5.62 MIL/MM3


(4.00-5.30)


 


Hemoglobin


  12.6 GM/DL


(11.6-15.3)


 


Hematocrit


  39.0 %


(35.0-46.0)


 


Mean Corpuscular Volume


  69.3 FL


(80.0-100.0)


 


Mean Corpuscular Hemoglobin


  22.4 PG


(27.0-34.0)


 


Mean Corpuscular Hemoglobin


Concent 32.3 %


(32.0-36.0)


 


Red Cell Distribution Width


  18.8 %


(11.6-17.2)


 


Platelet Count


  866 TH/MM3


(150-450)


 


Mean Platelet Volume


  8.4 FL


(7.0-11.0)


 


Neutrophils (%) (Auto)


  88.2 %


(16.0-70.0)


 


Lymphocytes (%) (Auto)


  4.5 %


(9.0-44.0)


 


Monocytes (%) (Auto)


  4.4 %


(0.0-8.0)


 


Eosinophils (%) (Auto)


  2.5 %


(0.0-4.0)


 


Basophils (%) (Auto)


  0.4 %


(0.0-2.0)


 


Neutrophils # (Auto)


  15.9 TH/MM3


(1.8-7.7)


 


Lymphocytes # (Auto)


  0.8 TH/MM3


(1.0-4.8)


 


Monocytes # (Auto)


  0.8 TH/MM3


(0-0.9)


 


Eosinophils # (Auto)


  0.5 TH/MM3


(0-0.4)


 


Basophils # (Auto)


  0.1 TH/MM3


(0-0.2)


 


CBC Comment AUTO DIFF 


 


Differential Total Cells


Counted 100 


 


 


Neutrophils % (Manual) 81 % (16-70) 


 


Band Neutrophils % 5 % (0-6) 


 


Lymphocytes % 3 % (9-44) 


 


Monocytes % 3 % (0-8) 


 


Eosinophils % 4 % (0-4) 


 


Basophils % 2 % (0-2) 


 


Neutrophils # (Manual)


  15.8 TH/MM3


(1.8-7.7)


 


Myelocytes 2 % (0-0) 


 


Differential Comment


  FINAL DIFF


MANUAL


 


Atypical Lymphocytes  % (0-0) 


 


Platelet Estimate HIGH (NORMAL) 


 


Platelet Morphology Comment


  ENLARGED


(NORMAL)


 


Ovalocytes 1+ (NORMAL) 


 


[place] Cells 1+ (NORMAL) 


 


Keratocytes OCC (NORMAL) 


 


Blood Urea Nitrogen 6 MG/DL (7-18) 


 


Creatinine


  0.54 MG/DL


(0.50-1.00)


 


Random Glucose


  89 MG/DL


()


 


Calcium Level


  8.0 MG/DL


(8.5-10.1)


 


Magnesium Level


  2.1 MG/DL


(1.5-2.5)


 


Sodium Level


  138 MEQ/L


(136-145)


 


Potassium Level


  3.4 MEQ/L


(3.5-5.1)


 


Chloride Level


  104 MEQ/L


()


 


Carbon Dioxide Level


  27.1 MEQ/L


(21.0-32.0)


 


Anion Gap 7 MEQ/L (5-15) 


 


Estimat Glomerular Filtration


Rate 110 ML/MIN


(>89)








Result Diagram:  


12/27/17 0529                                                                  

              12/27/17 0529








(1) Peripheral vascular disease


(2) CAD (coronary artery disease)


(3) Cardiomyopathy


(4) Atrial fibrillation


(5) Hypertension











Amber Acosta MD Dec 27, 2017 10:02

## 2017-12-27 NOTE — PD.CARD.PN
Subjective


Subjective Remarks


No SOB or CP, feels fine





Objective


Medications





Current Medications








 Medications


  (Trade)  Dose


 Ordered  Sig/Carlitos


 Route  Start Time


 Stop Time Status Last Admin


 


  (NS Flush)  2 ml  UNSCH  PRN


 IV FLUSH  12/17/17 16:00


     


 


 


  (NS Flush)  2 ml  BID


 IV FLUSH  12/17/17 21:00


    12/27/17 09:00


 


 


  (Tylenol)  650 mg  Q4H  PRN


 PO  12/17/17 16:00


     


 


 


  (Zofran Inj)  4 mg  Q6H  PRN


 IVP  12/17/17 16:00


     


 


 


  (Restoril)  15 mg  HS  PRN


 PO  12/17/17 16:00


     


 


 


  (Narcan Inj)  0.4 mg  UNSCH  PRN


 IV PUSH  12/17/17 16:00


     


 


 


  (Tiarra-Colace)  1 tab  BID


 PO  12/17/17 21:00


    12/27/17 09:13


 


 


  (Milk Of


 Magnesia Liq)  30 ml  Q12H  PRN


 PO  12/17/17 16:00


     


 


 


  (Senokot)  17.2 mg  Q12H  PRN


 PO  12/17/17 16:00


     


 


 


  (Dulcolax Supp)  10 mg  DAILY  PRN


 RECTAL  12/17/17 16:00


     


 


 


  (Lactulose Liq)  30 ml  DAILY  PRN


 PO  12/17/17 16:00


     


 


 


 Lactated Ringer's  1,000 ml @ 


 40 mls/hr  Q24H


 IV  12/20/17 13:00


    12/24/17 19:52


 


 


  (Plavix)  75 mg  DAILY


 PO  12/22/17 17:15


    12/27/17 09:13


 


 


  (Duoneb Neb)  1 ampule  QID  NEB


 NEB  12/24/17 16:00


    12/26/17 20:32


 


 


  (Lanoxin)  0.125 mg  DAILY


 PO  12/24/17 17:00


    12/27/17 09:13


 


 


 Cefazolin Sodium


 1000 mg/Sodium


 Chloride  100 ml @ 


 200 mls/hr  ON  CALL


 IV  12/25/17 12:15


 12/28/17 12:14   


 


 


  (Lopressor)  25 mg  Q12HR


 PO  12/25/17 21:00


    12/27/17 09:13


 


 


 Piperacillin Sod/


 Tazobactam Sod  100 ml @ 


 200 mls/hr  Q6H


 IV  12/25/17 15:00


    12/27/17 09:12


 


 


 Pharmacy Profile


 Note  0 ml @ 0


 mls/hr  UNSCH


 OTHER  12/25/17 13:45


     


 


 


  (Duoneb Neb)  1 ampule  Q4HR NEB  PRN


 INH  12/25/17 13:45


     


 


 


 Vancomycin HCl


 750 mg/Sodium


 Chloride  257.5 ml @ 


 250 mls/hr  Q18H


 IV  12/25/17 16:00


    12/27/17 04:24


 


 


 Miscellaneous


 Information  SPECIFIC LAB TO BE


 DRAWN:VANCO TROUGH


 DATE TO BE DR...  ONCE  ONCE


 .XX  12/27/17 21:45


 12/27/17 21:46   


 


 


 Miscellaneous


 Information  ALL


 NURSING


 DEPARTME...  UNSCH  PRN


 .XX  12/26/17 16:15


 12/27/17 16:14   


 








Vital Signs / I&O





Vital Signs








  Date Time  Temp Pulse Resp B/P (MAP) Pulse Ox O2 Delivery O2 Flow Rate FiO2


 


12/27/17 10:00  83      


 


12/27/17 08:00 98.4 85 14 118/76 (90) 96   


 


12/27/17 08:00  82      


 


12/27/17 07:53     95 Nasal Cannula 3.00 


 


12/27/17 07:00     97 Room Air  


 


12/27/17 06:00  85      


 


12/27/17 04:00  82 18 119/55 (76) 99   


 


12/27/17 04:00  82      


 


12/27/17 02:00  86      


 


12/27/17 00:00  88      


 


12/27/17 00:00  88 16 115/55 (75) 96   


 


12/26/17 23:00  86      


 


12/26/17 22:00  90      


 


12/26/17 20:32     96 Nasal Cannula 3.00 


 


12/26/17 20:00 97.5 82 21 114/70 (85) 96   


 


12/26/17 20:00  77      


 


12/26/17 19:00     96 Nasal Cannula 3.00 


 


12/26/17 18:00  73      


 


12/26/17 16:15 97.6 81 20 127/63 (84) 99 Nasal Cannula 2 


 


12/26/17 16:00 97.6 75 12 112/59 (76) 91   





    115/48 (70)    


 


12/26/17 16:00  75      


 


12/26/17 16:00  81 20 129/61 (83) 100 Nasal Cannula 2 


 


12/26/17 15:45  79 20 111/56 (74) 99 Nasal Cannula 2 


 


12/26/17 15:30  80 20 124/66 (85) 96 Nasal Cannula 2 


 


12/26/17 15:15 97.6 80 20 127/67 (87) 95 Nasal Cannula 2 














I/O      


 


 12/26/17 12/26/17 12/26/17 12/27/17 12/27/17 12/27/17





 07:00 15:00 23:00 07:00 15:00 23:00


 


Intake Total 100 ml  2150 ml 802 ml  


 


Output Total 450 ml 850 ml 1965 ml 345 ml  


 


Balance -350 ml -850 ml 185 ml 457 ml  


 


      


 


Intake Oral   200 ml 100 ml  


 


IV Total 100 ml  450 ml 702 ml  


 


Other   1500 ml   


 


Output Urine Total 450 ml 850 ml 1800 ml 300 ml  


 


Drainage Total   15 ml 45 ml  


 


Estimated Blood Loss   150 ml   


 


# Bowel Movements 1  1 0  








Physical Exam


GENERAL: In NAD


SKIN: Warm and dry.


HEAD: Normocephalic.


EYES: No scleral icterus. No injection or drainage. 


NECK: Supple, trachea midline. No JVD or lymphadenopathy.


CARDIOVASCULAR: Irregular, without murmurs, gallops, or rubs. 


RESPIRATORY: Breath sounds equal bilaterally. Few rhonchi.


GASTROINTESTINAL: Abdomen soft, non-tender, nondistended. 


MUSCULOSKELETAL: No cyanosis, or edema.





Laboratory





Laboratory Tests








Test


  12/26/17


15:36 12/27/17


05:29


 


White Blood Count 24.2 TH/MM3  18.0 TH/MM3 


 


Red Blood Count 6.22 MIL/MM3  5.62 MIL/MM3 


 


Hemoglobin 13.7 GM/DL  12.6 GM/DL 


 


Hematocrit 43.0 %  39.0 % 


 


Mean Corpuscular Volume 69.2 FL  69.3 FL 


 


Mean Corpuscular Hemoglobin 22.0 PG  22.4 PG 


 


Mean Corpuscular Hemoglobin


Concent 31.8 % 


  32.3 % 


 


 


Red Cell Distribution Width 18.6 %  18.8 % 


 


Platelet Count 1085 TH/MM3  866 TH/MM3 


 


Mean Platelet Volume 8.4 FL  8.4 FL 


 


Neutrophils (%) (Auto) 88.1 %  88.2 % 


 


Lymphocytes (%) (Auto) 3.4 %  4.5 % 


 


Monocytes (%) (Auto) 5.3 %  4.4 % 


 


Eosinophils (%) (Auto) 2.7 %  2.5 % 


 


Basophils (%) (Auto) 0.5 %  0.4 % 


 


Neutrophils # (Auto) 21.4 TH/MM3  15.9 TH/MM3 


 


Lymphocytes # (Auto) 0.8 TH/MM3  0.8 TH/MM3 


 


Monocytes # (Auto) 1.3 TH/MM3  0.8 TH/MM3 


 


Eosinophils # (Auto) 0.7 TH/MM3  0.5 TH/MM3 


 


Basophils # (Auto) 0.1 TH/MM3  0.1 TH/MM3 


 


CBC Comment AUTO DIFF  AUTO DIFF 


 


Differential Total Cells


Counted 100 


  100 


 


 


Neutrophils % (Manual) 79 %  81 % 


 


Band Neutrophils % 6 %  5 % 


 


Lymphocytes % 2 %  3 % 


 


Monocytes % 8 %  3 % 


 


Eosinophils % 4 %  4 % 


 


Basophils % 1 %  2 % 


 


Neutrophils # (Manual) 20.6 TH/MM3  15.8 TH/MM3 


 


Differential Comment


  FINAL DIFF


MANUAL FINAL DIFF


MANUAL


 


Platelet Estimate HIGH  HIGH 


 


Platelet Morphology Comment ENLARGED  ENLARGED 


 


Ovalocytes 1+  1+ 


 


Myelocytes  2 % 


 


Atypical Lymphocytes   % 


 


Boss Cells  1+ 


 


Keratocytes  OCC 


 


Blood Urea Nitrogen  6 MG/DL 


 


Creatinine  0.54 MG/DL 


 


Random Glucose  89 MG/DL 


 


Calcium Level  8.0 MG/DL 


 


Magnesium Level  2.1 MG/DL 


 


Sodium Level  138 MEQ/L 


 


Potassium Level  3.4 MEQ/L 


 


Chloride Level  104 MEQ/L 


 


Carbon Dioxide Level  27.1 MEQ/L 


 


Anion Gap  7 MEQ/L 


 


Estimat Glomerular Filtration


Rate 


  110 ML/MIN 


 











Assessment and Plan


Problem List:  


(1) Peripheral vascular disease


ICD Codes:  I73.9 - Peripheral vascular disease, unspecified


Status:  Acute


(2) CAD (coronary artery disease)


ICD Codes:  I25.10 - Atherosclerotic heart disease of native coronary artery 

without angina pectoris


(3) Cardiomyopathy


ICD Codes:  I42.9 - Cardiomyopathy, unspecified


(4) Atrial fibrillation


ICD Codes:  I48.91 - Unspecified atrial fibrillation


(5) Hypertension


ICD Codes:  I10 - Essential (primary) hypertension


Assessment and Plan


Remains stable from cardiac standpoint after surgery. No angina or CHF 

exacerbation. Continue current program including risk factor modification. 

Increase activity, PT.











Quadrvanita,Gonzalo MERIDA Dec 27, 2017 14:24

## 2017-12-27 NOTE — HHI.PR
Subjective


Remarks


Follow up hypoxia, PAD. Patient reports feeling much better today. Leg pain has 

improved following surgery. Denies chest pain, dyspnea, nausea, vomiting.





Objective


Vitals





Vital Signs








  Date Time  Temp Pulse Resp B/P (MAP) Pulse Ox O2 Delivery O2 Flow Rate FiO2


 


12/27/17 07:53     95 Nasal Cannula 3.00 


 


12/27/17 06:00  85      


 


12/27/17 04:00  82 18 119/55 (76) 99   


 


12/27/17 04:00  82      


 


12/27/17 02:00  86      


 


12/27/17 00:00  88      


 


12/27/17 00:00  88 16 115/55 (75) 96   


 


12/26/17 23:00  86      


 


12/26/17 22:00  90      


 


12/26/17 20:32     96 Nasal Cannula 3.00 


 


12/26/17 20:00 97.5 82 21 114/70 (85) 96   


 


12/26/17 20:00  77      


 


12/26/17 19:00     96 Nasal Cannula 3.00 


 


12/26/17 18:00  73      


 


12/26/17 16:15 97.6 81 20 127/63 (84) 99 Nasal Cannula 2 


 


12/26/17 16:00 97.6 75 12 112/59 (76) 91   





    115/48 (70)    


 


12/26/17 16:00  75      


 


12/26/17 16:00  81 20 129/61 (83) 100 Nasal Cannula 2 


 


12/26/17 15:45  79 20 111/56 (74) 99 Nasal Cannula 2 


 


12/26/17 15:30  80 20 124/66 (85) 96 Nasal Cannula 2 


 


12/26/17 15:15 97.6 80 20 127/67 (87) 95 Nasal Cannula 2 


 


12/26/17 11:31 98.3 80 17 133/72 (92) 95   


 


12/26/17 11:29     95 Nasal Cannula 2 


 


12/26/17 10:00  82      














I/O      


 


 12/26/17 12/26/17 12/26/17 12/27/17 12/27/17 12/27/17





 07:00 15:00 23:00 07:00 15:00 23:00


 


Intake Total 100 ml  2150 ml 802 ml  


 


Output Total 450 ml 850 ml 1965 ml 345 ml  


 


Balance -350 ml -850 ml 185 ml 457 ml  


 


      


 


Intake Oral   200 ml 100 ml  


 


IV Total 100 ml  450 ml 702 ml  


 


Other   1500 ml   


 


Output Urine Total 450 ml 850 ml 1800 ml 300 ml  


 


Drainage Total   15 ml 45 ml  


 


Estimated Blood Loss   150 ml   


 


# Bowel Movements 1  1 0  








Result Diagram:  


12/27/17 0529                                                                  

              12/27/17 0529





Imaging





Last Impressions








Chest X-Ray 12/25/17 0000 Signed





Impressions: 





 Service Date/Time:  Monday, December 25, 2017 15:44 - CONCLUSION:  No 





 significant change in bilateral interstitial opacities suggesting pulmonary 





 edema. Small bilateral pleural effusions.     Ebenezer Mariscal MD 


 


Myocardial Perfusion Scan Nuc Med 12/21/17 0000 Signed





Impressions: 





 Service Date/Time:  Thursday, December 21, 2017 11:26 - CONCLUSION:  Suspected 





 infarcts of the right coronary and circumflex global decreased perfusion fixed 





 at rest. No obvious ischemia.  RISK CATEGORY:      Intermediate (1-3%% Annual 





 Mortality Rate)      Dejuan Asencio MD 


 


Head CT 12/18/17 0000 Signed





Impressions: 





 Service Date/Time:  Monday, December 18, 2017 05:41 - CONCLUSION:  1. 

Bilateral 





 suspected areas of encephalomalacia being more prominent on the right. 2. 





 Suspected small vessel ischemic change throughout the white matter. 3. 

Atrophy. 





 4. Acute area of hemorrhage or mass effect is not seen.     Emir Sorensen MD 


 


Lower Extremity Ultrasound 12/17/17 0000 Signed





Impressions: 





 Service Date/Time:  Sunday, December 17, 2017 20:48 - CONCLUSION: No DVT.     





 Emir Sorensen MD 


 


Aorta w/Runoff CTA 12/17/17 0000 Signed





Impressions: 





 Service Date/Time:  Sunday, December 17, 2017 17:47 - CONCLUSION:  1. 





 Atherosclerotic changes seen throughout the arterial system, including 





 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 

prominent 





 mural thrombus. 2. Atherosclerotic change in the external iliac and common 





 femoral arteries bilaterally as described above.  3. Occlusion of the 





 superficial femoral arteries bilaterally with reconstitution distally.  The 





 reconstitution is higher on the left side.  4. Normal trifurcation vessels 

seen 





 on the left side. 5. Diminished flow seen at the right trifurcation vessels.  





 The vessels can only be well seen on the delayed images.  The vessels can not 

be 





 traced into the foot.           Emir Sorensen MD 








Objective Remarks


General: Elderly female in no acute distress.


Heart: Irregular rhythm. No murmur.


Lungs: Scattered rhonchi. Breathing is nonlabored.


Abdomen: Soft, nontender, nondistended.


Extremities: No lower extremity edema.


Psych: Alert and oriented.


Procedures


12/22/17 exploration of the left groin, left common femoral and external iliac 

artery. Endarterectomy and patch angioplasty. External and common artery 

thromboembolectomy, superficial femoral popliteal artery thromboembolectomy, 

arteriogram.


12/26/17 Right external iliac-common femoral endarterectomy and patch 

angioplasty and right femoral-popliteal bypass, PTFE graft


Urinary Catheter:  No


Vascular Central Line Catheter:  No





A/P


Assessment and Plan


1. Peripheral arterial disease: Status post surgical intervention on the left 

lower extremity with clinical improvement. S/P right femoropopliteal bypass. 

Appreciate vascular surgery management. Pain is improved.


2. Cough, dyspnea: Continue antibiotic coverage of possible healthcare 

associated pneumonia. Continue supplemental oxygen, bronchodilators.


3. UTI: Urine culture shows Escherichia coli. Treated with Rocephin.


4. DVT prophylaxis: Heparin drip.


5. Deconditioning: Continue PT/OT. Will need SNF placement.


6. Leukocytosis: Possibly secondary to infection vs stress reaction. WBCs 

trending down.


7. Hypokalemia: Recheck labs.











David Schafer MD Dec 27, 2017 08:22

## 2017-12-28 ENCOUNTER — HOSPITAL ENCOUNTER (INPATIENT)
Dept: HOSPITAL 17 - H4EA | Age: 77
LOS: 12 days | Discharge: NURSING, REHAB OR LONG-TERM CARE | DRG: 56 | End: 2018-01-09
Attending: PSYCHIATRY & NEUROLOGY | Admitting: PSYCHIATRY & NEUROLOGY
Payer: MEDICARE

## 2017-12-28 VITALS
HEART RATE: 81 BPM | DIASTOLIC BLOOD PRESSURE: 60 MMHG | OXYGEN SATURATION: 94 % | TEMPERATURE: 96.4 F | RESPIRATION RATE: 20 BRPM | SYSTOLIC BLOOD PRESSURE: 110 MMHG

## 2017-12-28 VITALS
TEMPERATURE: 98.2 F | DIASTOLIC BLOOD PRESSURE: 79 MMHG | HEART RATE: 98 BPM | OXYGEN SATURATION: 92 % | RESPIRATION RATE: 17 BRPM | SYSTOLIC BLOOD PRESSURE: 168 MMHG

## 2017-12-28 VITALS
SYSTOLIC BLOOD PRESSURE: 140 MMHG | RESPIRATION RATE: 17 BRPM | DIASTOLIC BLOOD PRESSURE: 75 MMHG | TEMPERATURE: 97.3 F | OXYGEN SATURATION: 97 % | HEART RATE: 81 BPM

## 2017-12-28 VITALS
TEMPERATURE: 97.2 F | OXYGEN SATURATION: 95 % | HEART RATE: 93 BPM | DIASTOLIC BLOOD PRESSURE: 83 MMHG | RESPIRATION RATE: 18 BRPM | SYSTOLIC BLOOD PRESSURE: 145 MMHG

## 2017-12-28 VITALS — OXYGEN SATURATION: 96 %

## 2017-12-28 VITALS — BODY MASS INDEX: 21.99 KG/M2 | WEIGHT: 111.99 LBS | HEIGHT: 60 IN

## 2017-12-28 DIAGNOSIS — J81.1: ICD-10-CM

## 2017-12-28 DIAGNOSIS — I48.91: ICD-10-CM

## 2017-12-28 DIAGNOSIS — F02.80: ICD-10-CM

## 2017-12-28 DIAGNOSIS — I10: ICD-10-CM

## 2017-12-28 DIAGNOSIS — R26.9: ICD-10-CM

## 2017-12-28 DIAGNOSIS — J18.9: ICD-10-CM

## 2017-12-28 DIAGNOSIS — I25.10: ICD-10-CM

## 2017-12-28 DIAGNOSIS — B37.49: ICD-10-CM

## 2017-12-28 DIAGNOSIS — Z87.891: ICD-10-CM

## 2017-12-28 DIAGNOSIS — I73.9: ICD-10-CM

## 2017-12-28 DIAGNOSIS — E78.5: ICD-10-CM

## 2017-12-28 DIAGNOSIS — D75.1: ICD-10-CM

## 2017-12-28 DIAGNOSIS — M21.379: ICD-10-CM

## 2017-12-28 DIAGNOSIS — I42.9: ICD-10-CM

## 2017-12-28 DIAGNOSIS — Z86.73: ICD-10-CM

## 2017-12-28 DIAGNOSIS — D47.3: ICD-10-CM

## 2017-12-28 DIAGNOSIS — G30.9: Primary | ICD-10-CM

## 2017-12-28 LAB
BASOPHILS # BLD AUTO: 0.2 TH/MM3 (ref 0–0.2)
BASOPHILS NFR BLD: 1.1 % (ref 0–2)
BUN SERPL-MCNC: 5 MG/DL (ref 7–18)
CALCIUM SERPL-MCNC: 8.2 MG/DL (ref 8.5–10.1)
CHLORIDE SERPL-SCNC: 104 MEQ/L (ref 98–107)
CREAT SERPL-MCNC: 0.51 MG/DL (ref 0.5–1)
EOSINOPHIL # BLD: 0.5 TH/MM3 (ref 0–0.4)
EOSINOPHIL NFR BLD: 2.4 % (ref 0–4)
ERYTHROCYTE [DISTWIDTH] IN BLOOD BY AUTOMATED COUNT: 19.3 % (ref 11.6–17.2)
GFR SERPLBLD BASED ON 1.73 SQ M-ARVRAT: 117 ML/MIN (ref 89–?)
GLUCOSE SERPL-MCNC: 86 MG/DL (ref 74–106)
HCO3 BLD-SCNC: 25.4 MEQ/L (ref 21–32)
HCT VFR BLD CALC: 43.6 % (ref 35–46)
HGB BLD-MCNC: 13.6 GM/DL (ref 11.6–15.3)
LYMPHOCYTES # BLD AUTO: 0.9 TH/MM3 (ref 1–4.8)
LYMPHOCYTES NFR BLD AUTO: 4.1 % (ref 9–44)
MCH RBC QN AUTO: 21.7 PG (ref 27–34)
MCHC RBC AUTO-ENTMCNC: 31.1 % (ref 32–36)
MCV RBC AUTO: 69.7 FL (ref 80–100)
MONOCYTE #: 0.9 TH/MM3 (ref 0–0.9)
MONOCYTES NFR BLD: 4.6 % (ref 0–8)
NEUTROPHILS # BLD AUTO: 18.1 TH/MM3 (ref 1.8–7.7)
NEUTROPHILS NFR BLD AUTO: 87.8 % (ref 16–70)
PLATELET # BLD: 1045 TH/MM3 (ref 150–450)
PMV BLD AUTO: 8.7 FL (ref 7–11)
RBC # BLD AUTO: 6.26 MIL/MM3 (ref 4–5.3)
SODIUM SERPL-SCNC: 139 MEQ/L (ref 136–145)
WBC # BLD AUTO: 20.7 TH/MM3 (ref 4–11)

## 2017-12-28 PROCEDURE — 73700 CT LOWER EXTREMITY W/O DYE: CPT

## 2017-12-28 PROCEDURE — 93005 ELECTROCARDIOGRAM TRACING: CPT

## 2017-12-28 PROCEDURE — 82746 ASSAY OF FOLIC ACID SERUM: CPT

## 2017-12-28 PROCEDURE — 81270 JAK2 GENE: CPT

## 2017-12-28 PROCEDURE — 86140 C-REACTIVE PROTEIN: CPT

## 2017-12-28 PROCEDURE — 83540 ASSAY OF IRON: CPT

## 2017-12-28 PROCEDURE — 85027 COMPLETE CBC AUTOMATED: CPT

## 2017-12-28 PROCEDURE — 80053 COMPREHEN METABOLIC PANEL: CPT

## 2017-12-28 PROCEDURE — 87086 URINE CULTURE/COLONY COUNT: CPT

## 2017-12-28 PROCEDURE — 85007 BL SMEAR W/DIFF WBC COUNT: CPT

## 2017-12-28 PROCEDURE — 83735 ASSAY OF MAGNESIUM: CPT

## 2017-12-28 PROCEDURE — 80048 BASIC METABOLIC PNL TOTAL CA: CPT

## 2017-12-28 PROCEDURE — 83550 IRON BINDING TEST: CPT

## 2017-12-28 PROCEDURE — 94640 AIRWAY INHALATION TREATMENT: CPT

## 2017-12-28 PROCEDURE — 82607 VITAMIN B-12: CPT

## 2017-12-28 PROCEDURE — 71020: CPT

## 2017-12-28 PROCEDURE — 82728 ASSAY OF FERRITIN: CPT

## 2017-12-28 PROCEDURE — 71045 X-RAY EXAM CHEST 1 VIEW: CPT

## 2017-12-28 PROCEDURE — 85060 BLOOD SMEAR INTERPRETATION: CPT

## 2017-12-28 PROCEDURE — 76705 ECHO EXAM OF ABDOMEN: CPT

## 2017-12-28 PROCEDURE — 84100 ASSAY OF PHOSPHORUS: CPT

## 2017-12-28 PROCEDURE — 81206 BCR/ABL1 GENE MAJOR BP: CPT

## 2017-12-28 PROCEDURE — 81001 URINALYSIS AUTO W/SCOPE: CPT

## 2017-12-28 PROCEDURE — 94664 DEMO&/EVAL PT USE INHALER: CPT

## 2017-12-28 PROCEDURE — 85652 RBC SED RATE AUTOMATED: CPT

## 2017-12-28 PROCEDURE — 81207 BCR/ABL1 GENE MINOR BP: CPT

## 2017-12-28 RX ADMIN — CLOPIDOGREL BISULFATE SCH MG: 75 TABLET, FILM COATED ORAL at 17:30

## 2017-12-28 RX ADMIN — Medication SCH MG: at 21:00

## 2017-12-28 RX ADMIN — IPRATROPIUM BROMIDE AND ALBUTEROL SULFATE SCH AMPULE: .5; 3 SOLUTION RESPIRATORY (INHALATION) at 11:57

## 2017-12-28 RX ADMIN — IPRATROPIUM BROMIDE AND ALBUTEROL SULFATE SCH AMPULE: .5; 3 SOLUTION RESPIRATORY (INHALATION) at 08:01

## 2017-12-28 RX ADMIN — TAZOBACTAM SODIUM AND PIPERACILLIN SODIUM SCH MLS/HR: 500; 4 INJECTION, SOLUTION INTRAVENOUS at 08:30

## 2017-12-28 RX ADMIN — Medication SCH ML: at 08:30

## 2017-12-28 RX ADMIN — ATORVASTATIN CALCIUM SCH MG: 80 TABLET, FILM COATED ORAL at 20:56

## 2017-12-28 RX ADMIN — METOPROLOL TARTRATE SCH MG: 25 TABLET, FILM COATED ORAL at 20:56

## 2017-12-28 RX ADMIN — DIGOXIN SCH MG: 125 TABLET ORAL at 08:29

## 2017-12-28 RX ADMIN — STANDARDIZED SENNA CONCENTRATE AND DOCUSATE SODIUM SCH TAB: 8.6; 5 TABLET, FILM COATED ORAL at 08:30

## 2017-12-28 RX ADMIN — ATORVASTATIN CALCIUM SCH MG: 80 TABLET, FILM COATED ORAL at 21:00

## 2017-12-28 RX ADMIN — CLOPIDOGREL BISULFATE SCH MG: 75 TABLET, FILM COATED ORAL at 08:29

## 2017-12-28 RX ADMIN — Medication SCH MG: at 20:56

## 2017-12-28 RX ADMIN — METOPROLOL TARTRATE SCH MG: 25 TABLET, FILM COATED ORAL at 08:29

## 2017-12-28 RX ADMIN — DIGOXIN SCH MG: 125 TABLET ORAL at 17:30

## 2017-12-28 RX ADMIN — TAZOBACTAM SODIUM AND PIPERACILLIN SODIUM SCH MLS/HR: 500; 4 INJECTION, SOLUTION INTRAVENOUS at 03:04

## 2017-12-28 NOTE — HHI.DCPOC
Discharge Care Plan


Diagnosis:  


(1) Psychological factors affecting medical condition


(2) Peripheral vascular disease


Goals to Promote Your Health


* To prevent worsening of your condition and complications


* To maintain your health at the optimal level


Directions to Meet Your Goals


*** Take your medications as prescribed


*** Follow your dietary instruction


*** Follow activity as directed








*** Keep your appointments as scheduled


*** Take your immunizations and boosters as scheduled


*** If your symptoms worsen call your PCP, if no PCP go to Urgent Care Center 

or Emergency Room***


*** Smoking is Dangerous to Your Health. Avoid second hand smoke***


***Call the 24-hour hour crisis hotline for domestic abuse at 1-582.291.8645***











Hali Augustine MD Dec 28, 2017 13:25

## 2017-12-28 NOTE — PD.CAR.PN
CVT Progress Note


Subjective/Hospital Course:


Referral received


Full consult to follow


Severe for peripheral vascular disease will require reconstruction


Valentin PEREZ


12/19/17


As noted in my consultation this patient has bilateral severe peripheral 

vascular disease


On top of that she is being treated for urosepsis and general decline


Patient has not been in the hospital for 3 days and has gradually improved for 

her urosepsis based on clinical exam level of alertness and diagnostic studies


Despite heparin drip the right leg is looking worse and worse and patient now 

has ischemia that needs attention in the resolution


In the best of worlds I would like to wait another while to do the surgery 

considering patient's other issues but that this point if surgery is not 

performed patient is a very high risk of losing her leg


I have reviewed patient's echocardiogram and studies and she is at this point 

moderate risk for vascular surgery but I believe options a limited and we have 

to go ahead with it.


Therefore patient will be scheduled for common femoral endarterectomy and 

femoropopliteal bypass tomorrow


I explained the risks and benefits of the procedure and all patient is very 

quiet she is clearly awake and alert and understands the discussion


Have tried to reach her daughter however number is apparently disconnected 


12/2017


Patient originally scheduled for surgery today however EKG reveals some 

anterior ischemia in addition to inferior ischemia that was noted on previous 

EKG


In discussion with the anesthesiologist the decision is made to postpone the 

surgery until patient can be worked up cardiac wise in more detail so we get a 

better picture on cardiac risk and possible remedy


Right leg and foot are ischemic and the cyanotic however while this surgery is 

urgent it is not an emergent and cardiac issues trump any other issue right now


We'll consult cardiology for full evaluation possible stress test


Restart heparin


All things equal once cardiac workup completed we'll proceed with vascular 

bypass and reconstruction on the right leg


12/21/17


Right leg and foot remain ischemic and cyanotic


Discussed with Dr. Pastor


This patient will lose her leg if for no surgery is done in next few days.  On 

the other hand patient has significant coronary artery disease and major other 

comorbidities and hence the high risk


Dr. Pastor we'll proceed with cardiac catheterization through the left leg and 

based on that will decide which way to go


There is of course a good chance the patient will be candidate for any surgery 

and that his then the rendering but we should make every effort to make patient 

at least be able to sustain a limb saving operation.


On the other hand limb saving operation has not much value if patient loses her 

life in the process and therefore every effort should be made to get patient in 

shape good enough to tolerate surgery


12/23/17


Patient status post revascularization of the left leg


Incision is clean and dry


Patient has warm foot with normal capillary refill and strong dopplerable 

popliteal, dorsalis pedis posterior tibial pulses


Right leg is still cyanotic and imminently at risk


We'll give patient another day and address this tomorrow.


Most likely all patient needs is iliofemoral endarterectomy and patch and 

during the surgery we will see if she needs a femoropopliteal bypass in 

addition but I would like to minimize the amount of surgery on this unfortunate 

lady


Transfer to floor today


12/24/17


Patient doing well at this time she is more alert and awake and conversing much 

better than she did in last few days


Left femoral strong palpable pulse strong dopplerable popliteal dissolves pedis 

and posterior tibial pulses on the left


Will give her few days respite between the general anesthesia sessions


Patient is to undergo right femoral endarterectomy and possible bypass on 

Tuesday


After the surgery patient will probably need some nursing home/rehabilitation 

placement because obviously patient cannot go home and take care of herself


12/25/17


Patient doing okay at this time


She is more alert and awake and answers questions appropriately and matter-of-

fact asking questions about her legs


Left leg is well-perfused incision is clean and dry


Right leg on the other hand is cyanotic and purple as yesterday


For right iliofemoral endarterectomy and possible femoropopliteal bypass 

tomorrow


This lady is fairly significant risk patient however in absence of surgery she 

will lose right leg and chance of this is 100%


12/27/17


Patient doing very well


Status post right external iliac and common femoral endarterectomy and 

femoropopliteal bypass with a patch graft


Incisions are clean and dry and patient is excellent flow to the foot with 

strong dopplerable pulses


Both feet are warm and well perfused at this time


We will leave LOIS in for another day


Transfer patient to floor


Aggressive physical therapy out of bed


Nothing to add to care from my point


12/28/17


Incisions are clean and dry nothing to add to care


Patient is excellent distal pulses and feet are warm


DC LOIS today


Incision skin stay open to air


Follow-up with me in about a month


Objective:





Vital Signs








  Date Time  Temp Pulse Resp B/P (MAP) Pulse Ox O2 Delivery O2 Flow Rate FiO2


 


12/28/17 12:00 97.3 81 17 140/75 (96) 97   


 


12/28/17 08:03     96 Nasal Cannula 2.00 


 


12/28/17 08:00 97.2 93 18 145/83 (103) 95   


 


12/28/17 00:00 96.4 81 20 110/60 (77) 94   


 


12/27/17 20:05      Nasal Cannula 2.00 


 


12/27/17 20:00 97.9 91 20 124/67 (86) 94   


 


12/27/17 16:00 97.5 100 16 110/65 (80) 90   


 


12/27/17 15:25     91   21








Labs:





Laboratory Tests








Test


  12/28/17


03:46


 


White Blood Count


  20.7 TH/MM3


(4.0-11.0)


 


Red Blood Count


  6.26 MIL/MM3


(4.00-5.30)


 


Hemoglobin


  13.6 GM/DL


(11.6-15.3)


 


Hematocrit


  43.6 %


(35.0-46.0)


 


Mean Corpuscular Volume


  69.7 FL


(80.0-100.0)


 


Mean Corpuscular Hemoglobin


  21.7 PG


(27.0-34.0)


 


Mean Corpuscular Hemoglobin


Concent 31.1 %


(32.0-36.0)


 


Red Cell Distribution Width


  19.3 %


(11.6-17.2)


 


Platelet Count


  1045 TH/MM3


(150-450)


 


Mean Platelet Volume


  8.7 FL


(7.0-11.0)


 


Neutrophils (%) (Auto)


  87.8 %


(16.0-70.0)


 


Lymphocytes (%) (Auto)


  4.1 %


(9.0-44.0)


 


Monocytes (%) (Auto)


  4.6 %


(0.0-8.0)


 


Eosinophils (%) (Auto)


  2.4 %


(0.0-4.0)


 


Basophils (%) (Auto)


  1.1 %


(0.0-2.0)


 


Neutrophils # (Auto)


  18.1 TH/MM3


(1.8-7.7)


 


Lymphocytes # (Auto)


  0.9 TH/MM3


(1.0-4.8)


 


Monocytes # (Auto)


  0.9 TH/MM3


(0-0.9)


 


Eosinophils # (Auto)


  0.5 TH/MM3


(0-0.4)


 


Basophils # (Auto)


  0.2 TH/MM3


(0-0.2)


 


CBC Comment DIFF FINAL 


 


Differential Comment  


 


Blood Urea Nitrogen 5 MG/DL (7-18) 


 


Creatinine


  0.51 MG/DL


(0.50-1.00)


 


Random Glucose


  86 MG/DL


()


 


Calcium Level


  8.2 MG/DL


(8.5-10.1)


 


Sodium Level


  139 MEQ/L


(136-145)


 


Potassium Level


  3.7 MEQ/L


(3.5-5.1)


 


Chloride Level


  104 MEQ/L


()


 


Carbon Dioxide Level


  25.4 MEQ/L


(21.0-32.0)


 


Anion Gap


  10 MEQ/L


(5-15)


 


Estimat Glomerular Filtration


Rate 117 ML/MIN


(>89)








Result Diagram:  


12/28/17 0346                                                                  

              12/28/17 0346








(1) Peripheral vascular disease


(2) CAD (coronary artery disease)


(3) Cardiomyopathy


(4) Atrial fibrillation


(5) Hypertension











Amber Acosta MD Dec 28, 2017 13:46

## 2017-12-28 NOTE — HHI.PYPN
Subjective


Remarks


Patient was seen today for psychiatric reevaluation.  Case discussed with 

nursing charge and also with attending physician.  Dr. Mckeon documentation 

reviewed.  On psychiatric evaluation patient is poor historian, reticent, 

disorganized and confused.  Patient doesn't know the reason she is in the 

hospital, she is just partially oriented in place, disoriented in time.  

Episodically agitated but not aggressive.  On prior medications, sideeffects.





Review of Systems


Except as stated in HPI:  all other systems reviewed are Neg





Mental Status Examination


Appearance:  Appropriate


Consciousness:  Alert


Orientation:  Person


Motor Activity:  Other (hypoactive)


Speech:  Slow, Incoherent


Language:  Other (deficient at this time)


Attention and Concentration:  Inadequate


Memory:  Impaired


Mood:  Other (oppositional, distant)


Affect:  Flat


Thought Process & Associations:  Disorganized, Other


Thought Content:  Bizarre thinking, Delusional


Hallucination Type:  None


Delusion Type:  None


Suicidal Ideation:  No


Suicidal Plan:  No


Suicidal Intention:  No


Homicidal Ideation:  No


Homicidal Plan:  No


Insight:  Fair


Judgment:  Impulsive





Results


Labs











Test


  12/27/17


22:20 12/28/17


03:46


 


Vancomycin Level Trough 10.7 MCG/ML  


 


White Blood Count  20.7 TH/MM3 


 


Red Blood Count  6.26 MIL/MM3 


 


Hemoglobin  13.6 GM/DL 


 


Hematocrit  43.6 % 


 


Mean Corpuscular Volume  69.7 FL 


 


Mean Corpuscular Hemoglobin  21.7 PG 


 


Mean Corpuscular Hemoglobin


Concent 


  31.1 % 


 


 


Red Cell Distribution Width  19.3 % 


 


Platelet Count  1045 TH/MM3 


 


Mean Platelet Volume  8.7 FL 


 


Neutrophils (%) (Auto)  87.8 % 


 


Lymphocytes (%) (Auto)  4.1 % 


 


Monocytes (%) (Auto)  4.6 % 


 


Eosinophils (%) (Auto)  2.4 % 


 


Basophils (%) (Auto)  1.1 % 


 


Neutrophils # (Auto)  18.1 TH/MM3 


 


Lymphocytes # (Auto)  0.9 TH/MM3 


 


Monocytes # (Auto)  0.9 TH/MM3 


 


Eosinophils # (Auto)  0.5 TH/MM3 


 


Basophils # (Auto)  0.2 TH/MM3 


 


CBC Comment  DIFF FINAL 


 


Differential Comment   


 


Blood Urea Nitrogen  5 MG/DL 


 


Creatinine  0.51 MG/DL 


 


Random Glucose  86 MG/DL 


 


Calcium Level  8.2 MG/DL 


 


Sodium Level  139 MEQ/L 


 


Potassium Level  3.7 MEQ/L 


 


Chloride Level  104 MEQ/L 


 


Carbon Dioxide Level  25.4 MEQ/L 


 


Anion Gap  10 MEQ/L 


 


Estimat Glomerular Filtration


Rate 


  117 ML/MIN 


 














 Date/Time


Source Procedure


Growth Status


 


 


 12/25/17 16:45


Blood Peripheral Aerobic Blood Culture - Preliminary


NO GROWTH IN 3 DAYS Resulted


 


 12/25/17 16:45


Blood Peripheral Anaerobic Blood Culture - Preliminary


NO GROWTH IN 3 DAYS Resulted


 


 12/25/17 18:45


Urine Clean Catch Urine Culture - Final


Candida Albicans Complete








Vitals/IOs





Vital Signs








  Date Time  Temp Pulse Resp B/P (MAP) Pulse Ox O2 Delivery O2 Flow Rate FiO2


 


12/28/17 12:00 97.3 81 17 140/75 (96) 97   


 


12/28/17 08:03      Nasal Cannula 2.00 


 


12/27/17 15:25        21














Intake and Output   


 


 12/28/17 12/28/17 12/29/17





 08:00 16:00 00:00


 


Intake Total 497.5 ml  


 


Output Total 1300 ml 60 ml 


 


Balance -802.5 ml -60 ml 











Assessment & Plan


Problem List:  


(1) Psychological factors affecting medical condition


ICD Codes:  F54 - Psychological and behavioral factors associated with 

disorders or diseases classified elsewhere


Assessment & Plan:  Patient remains disorganized, with flat affect, confused, 

unable to articulate reasonable statements.  Given her story of self neglecting 

behavior and current medical status, patient represents danger to herself and 

she needs psychiatric admission for stabilization and safety.  We'll start 

Seroquel 12.5 mg twice a day.  Patient will be Meyer acted and transferred to 

med psych.





Assessment & Plan


Estimated LOS:  days


Justification for Cont. Inpt.


Patient needs psychiatric hospitalization for stabilization and safety.











Stoney Tee MD Dec 28, 2017 13:28

## 2017-12-28 NOTE — HHI.DS
__________________________________________________





Discharge Summary


Admission Date


Dec 17, 2017 at 14:44


Discharge Date:  Dec 28, 2017


Admitting Diagnosis





sepsis/uti





(1) UTI (urinary tract infection)


ICD Code:  N39.0 - Urinary tract infection, site not specified


Status:  Acute


(2) Sepsis


ICD Code:  A41.9 - Sepsis, unspecified organism


Status:  Acute


(3) Atrial fibrillation


ICD Code:  I48.91 - Unspecified atrial fibrillation


(4) Peripheral vascular disease


ICD Code:  I73.9 - Peripheral vascular disease, unspecified


Status:  Acute


Procedures


12/22/17 exploration of the left groin, left common femoral and external iliac 

artery. Endarterectomy and patch angioplasty. External and common artery 

thromboembolectomy, superficial femoral popliteal artery thromboembolectomy, 

arteriogram.


12/26/17 Right external iliac-common femoral endarterectomy and patch 

angioplasty and right femoral-popliteal bypass, PTFE graft


Brief History - From Admission


Patient is 77-year-old female with PMH of HTN, HLD brought into the emergency 

department via EMS for evaluation of generalized weakness.  Patient is a poor 

historian. Per their report she has been laying on her couch for the last 7 

days unable to get up because she has been weak.  Apparently either patient or 

daughter called 911.  Patient states that she has not been cleaned in several 

days.  Patient states she has not been eating well.  EMS reported that living 

conditions were suboptimal at best.  Patient smelled of feces and urine on 

arrival. Further work up reveals UTI  with sepsis. Started on abb after 

cultures obtained. 


Patient also was noted with discolored LE bluish discoloration, cold LE , and 

no pulses detected by US. Ao run off ordered and also vascular surgeon 

consulted for further eval.


CBC/BMP:  


12/28/17 0346                                                                  

              12/28/17 0346





Significant Findings





Laboratory Tests








Test


  12/25/17


14:00 12/25/17


16:45 12/25/17


18:45 12/26/17


03:42


 


Blood Gas HCO3


  27 mmol/L


(22-26) 


  


  


 


 


Blood Gas Base Excess


  3.3 mmol/L


(-2-2) 


  


  


 


 


Arterial Blood pH


  7.50


(7.380-7.420) 


  


  


 


 


Arterial Blood Partial


Pressure CO2 35 mmHg


(38-42) 


  


  


 


 


Arterial Blood Oxygen Content


  20.4 Vol %


(12.0-20.0) 


  


  


 


 


Urine Turbidity   HAZY (CLEAR)  


 


Urine Occult Blood   TRACE (NEG)  


 


Urine Leukocyte Esterase   LARGE (NEG)  


 


Urine RBC   22 /hpf (0-3)  


 


Urine WBC   182 /hpf (0-5)  


 


Urine WBC Clumps   RARE (NONE)  


 


Urine Bacteria


  


  


  OCC /hpf


(NONE) 


 


 


Urine Mucus   FEW /lpf (OCC)  


 


Urine Yeast with Hyphae   FEW (NONE)  


 


Urine Yeast (Budding)   MANY (NONE)  


 


White Blood Count


  


  


  


  17.7 TH/MM3


(4.0-11.0)


 


Red Blood Count


  


  


  


  6.11 MIL/MM3


(4.00-5.30)


 


Mean Corpuscular Volume


  


  


  


  69.1 FL


(80.0-100.0)


 


Mean Corpuscular Hemoglobin


  


  


  


  22.7 PG


(27.0-34.0)


 


Red Cell Distribution Width


  


  


  


  19.4 %


(11.6-17.2)


 


Platelet Count


  


  


  


  946 TH/MM3


(150-450)


 


Neutrophils (%) (Auto)


  


  


  


  86.7 %


(16.0-70.0)


 


Lymphocytes (%) (Auto)


  


  


  


  4.3 %


(9.0-44.0)


 


Neutrophils # (Auto)


  


  


  


  15.3 TH/MM3


(1.8-7.7)


 


Lymphocytes # (Auto)


  


  


  


  0.8 TH/MM3


(1.0-4.8)


 


Eosinophils # (Auto)


  


  


  


  0.5 TH/MM3


(0-0.4)


 


Test


  12/26/17


09:16 12/26/17


15:36 12/27/17


05:29 12/27/17


22:20


 


Activated Partial


Thromboplast Time 30.2 SEC


(24.3-30.1) 


  


  


 


 


White Blood Count


  


  24.2 TH/MM3


(4.0-11.0) 18.0 TH/MM3


(4.0-11.0) 


 


 


Red Blood Count


  


  6.22 MIL/MM3


(4.00-5.30) 5.62 MIL/MM3


(4.00-5.30) 


 


 


Mean Corpuscular Volume


  


  69.2 FL


(80.0-100.0) 69.3 FL


(80.0-100.0) 


 


 


Mean Corpuscular Hemoglobin


  


  22.0 PG


(27.0-34.0) 22.4 PG


(27.0-34.0) 


 


 


Mean Corpuscular Hemoglobin


Concent 


  31.8 %


(32.0-36.0) 


  


 


 


Red Cell Distribution Width


  


  18.6 %


(11.6-17.2) 18.8 %


(11.6-17.2) 


 


 


Platelet Count


  


  1085 TH/MM3


(150-450) 866 TH/MM3


(150-450) 


 


 


Neutrophils (%) (Auto)


  


  88.1 %


(16.0-70.0) 88.2 %


(16.0-70.0) 


 


 


Lymphocytes (%) (Auto)


  


  3.4 %


(9.0-44.0) 4.5 %


(9.0-44.0) 


 


 


Neutrophils # (Auto)


  


  21.4 TH/MM3


(1.8-7.7) 15.9 TH/MM3


(1.8-7.7) 


 


 


Lymphocytes # (Auto)


  


  0.8 TH/MM3


(1.0-4.8) 0.8 TH/MM3


(1.0-4.8) 


 


 


Monocytes # (Auto)


  


  1.3 TH/MM3


(0-0.9) 


  


 


 


Eosinophils # (Auto)


  


  0.7 TH/MM3


(0-0.4) 0.5 TH/MM3


(0-0.4) 


 


 


Neutrophils % (Manual)  79 % (16-70)  81 % (16-70)  


 


Lymphocytes %  2 % (9-44)  3 % (9-44)  


 


Neutrophils # (Manual)


  


  20.6 TH/MM3


(1.8-7.7) 15.8 TH/MM3


(1.8-7.7) 


 


 


Platelet Estimate  HIGH (NORMAL)  HIGH (NORMAL)  


 


Platelet Morphology Comment


  


  ENLARGED


(NORMAL) ENLARGED


(NORMAL) 


 


 


Ovalocytes  1+ (NORMAL)  1+ (NORMAL)  


 


Myelocytes   2 % (0-0)  


 


Lydia Cells   1+ (NORMAL)  


 


Keratocytes   OCC (NORMAL)  


 


Blood Urea Nitrogen   6 MG/DL (7-18)  


 


Calcium Level


  


  


  8.0 MG/DL


(8.5-10.1) 


 


 


Potassium Level


  


  


  3.4 MEQ/L


(3.5-5.1) 


 


 


Vancomycin Level Trough


  


  


  


  10.7 MCG/ML


(5.0-10.0)


 


Test


  12/28/17


03:46 


  


  


 


 


White Blood Count


  20.7 TH/MM3


(4.0-11.0) 


  


  


 


 


Red Blood Count


  6.26 MIL/MM3


(4.00-5.30) 


  


  


 


 


Mean Corpuscular Volume


  69.7 FL


(80.0-100.0) 


  


  


 


 


Mean Corpuscular Hemoglobin


  21.7 PG


(27.0-34.0) 


  


  


 


 


Mean Corpuscular Hemoglobin


Concent 31.1 %


(32.0-36.0) 


  


  


 


 


Red Cell Distribution Width


  19.3 %


(11.6-17.2) 


  


  


 


 


Platelet Count


  1045 TH/MM3


(150-450) 


  


  


 


 


Neutrophils (%) (Auto)


  87.8 %


(16.0-70.0) 


  


  


 


 


Lymphocytes (%) (Auto)


  4.1 %


(9.0-44.0) 


  


  


 


 


Neutrophils # (Auto)


  18.1 TH/MM3


(1.8-7.7) 


  


  


 


 


Lymphocytes # (Auto)


  0.9 TH/MM3


(1.0-4.8) 


  


  


 


 


Eosinophils # (Auto)


  0.5 TH/MM3


(0-0.4) 


  


  


 


 


Blood Urea Nitrogen 5 MG/DL (7-18)    


 


Calcium Level


  8.2 MG/DL


(8.5-10.1) 


  


  


 








Imaging





Last Impressions








Chest X-Ray 12/25/17 0000 Signed





Impressions: 





 Service Date/Time:  Monday, December 25, 2017 15:44 - CONCLUSION:  No 





 significant change in bilateral interstitial opacities suggesting pulmonary 





 edema. Small bilateral pleural effusions.     Ebenezer Mariscal MD 


 


Myocardial Perfusion Scan Nuc Med 12/21/17 0000 Signed





Impressions: 





 Service Date/Time:  Thursday, December 21, 2017 11:26 - CONCLUSION:  Suspected 





 infarcts of the right coronary and circumflex global decreased perfusion fixed 





 at rest. No obvious ischemia.  RISK CATEGORY:      Intermediate (1-3%% Annual 





 Mortality Rate)      Dejuan Asencio MD 


 


Head CT 12/18/17 0000 Signed





Impressions: 





 Service Date/Time:  Monday, December 18, 2017 05:41 - CONCLUSION:  1. 

Bilateral 





 suspected areas of encephalomalacia being more prominent on the right. 2. 





 Suspected small vessel ischemic change throughout the white matter. 3. 

Atrophy. 





 4. Acute area of hemorrhage or mass effect is not seen.     Emir Sorensen MD 


 


Lower Extremity Ultrasound 12/17/17 0000 Signed





Impressions: 





 Service Date/Time:  Sunday, December 17, 2017 20:48 - CONCLUSION: No DVT.     





 Emir Sorensen MD 


 


Aorta w/Runoff CTA 12/17/17 0000 Signed





Impressions: 





 Service Date/Time:  Sunday, December 17, 2017 17:47 - CONCLUSION:  1. 





 Atherosclerotic changes seen throughout the arterial system, including 





 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 

prominent 





 mural thrombus. 2. Atherosclerotic change in the external iliac and common 





 femoral arteries bilaterally as described above.  3. Occlusion of the 





 superficial femoral arteries bilaterally with reconstitution distally.  The 





 reconstitution is higher on the left side.  4. Normal trifurcation vessels 

seen 





 on the left side. 5. Diminished flow seen at the right trifurcation vessels.  





 The vessels can only be well seen on the delayed images.  The vessels can not 

be 





 traced into the foot.           Emir Sorensen MD 








PE at Discharge


General: Elderly female in no acute distress.


Heart: Irregular rhythm. No murmur.


Lungs: Scattered rhonchi. Breathing is nonlabored.


Abdomen: Soft, nontender, nondistended.


Extremities: No lower extremity edema.


Psych: Alert and oriented.


Pt update on day of discharge


Patient seen in follow up for PAD and psychosis


Calm but confused


 d/w RN, Psychiatry,cardiology


Hospital Course


Patient is a 77-year-old female was admitted to the hospital 12/17 with 

generalized weakness.  Patient has been seen by palliative care, vascular 

surgery, psychiatry in the meantime.  She is a very poor historian and unable 

to provide significant information.  Multiple evaluations were done and patient 

was found to have sepsis secondary to urinary tract infection and pneumonia.  

She had abnormal EKG with abnormal stress test and subsequent cardiac 

catheterization.  He is also found to have peripheral artery disease and has 

now been status post right femoropopliteal bypass and femoral endarterectomy.  

Patient this time is doing well postsurgically however she still remains quite 

confused and psychotic.  The patient is now recommended for transfer to the 

psych facility for further treatment of psychosis.  She has been Baker acted 

she is unable to make decisions safely for herself.


Pt Condition on Discharge:  Good


Discharge Disposition:  Disc to Psych Care Fac


Discharge Time:  <= 30 minutes


Discharge Instructions


DIET: Follow Instructions for:  As Tolerated, No Restrictions


Activities you can perform:  Regular-No Restrictions


Follow up Referrals:  


Vascular Surgery - 2 Weeks with Amber Acosta MD





New Medications:  


Amoxicillin-Clavulanate (Augmentin) 500-125 mg Tab


500 MG PO BID for Infection, #10 TAB 0 Refills





Atorvastatin (Atorvastatin) 80 Mg Tab


80 MG PO HS for Cholesterol Management, #31 TAB





Clopidogrel (Plavix) 75 Mg Tab


75 MG PO DAILY for pad, #31 TAB





Digoxin (Digoxin) 0.125 Mg Tab


0.125 MG PO DAILY for afib, #31 TAB





Metoprolol Tartrate (Metoprolol Tartrate) 25 Mg Tab


25 MG PO Q12HR for afib, #62 TAB





Quetiapine (Seroquel) 25 Mg Tab


12.5 MG PO BID@09,12 for psychosis, #62 TAB





Temazepam (Restoril) 15 Mg Cap


15 MG PO HS PRN for INSOMNIA, #20 Hali Cantu MD Dec 28, 2017 13:40

## 2017-12-28 NOTE — HHI.HCPN
Reason for visit


   a.  To assist with evaluation and management of symptoms including: right 

lower extremity pain; confusion; generalized weakness


   b.  To assist medical decision maker(s) with: better understanding of 

current medical conditions; weighing benefits/burdens of medical treatment 

options; making medical treatment decisions.


.





Subjective/Interval History


Follow up visit for symptom management and clarification of medical treatment 

goals 





Patient remains confused status post right femoral-popliteal bypass and femoral 

endarterectomy. Patient was withdrawn, disorganized and confused on exam. She 

had no insight or judgment related to her current hospitalization. Patient 

becomes agitated at times when being asked questions but is not aggressive. 

Psychiatry was consulted to reevaluate the patient.  Given the patient's 

limited self neglecting behavior and current medical status, psychiatry Betsy 

acted the patient who will be transferred to the [facility] psych floor. Started on 

Seroquel 12.5 mg PO 2 times daily.





Afebrile. Follow-up chest x-ray on 12/25/2017 showed no significant change in 

bilateral interstitial opacities suggesting pulmonary edema, small bilateral 

pleural effusions. Urine culture growing Candida. WBC remains elevated at 20.7-

on vancomycin and Zosyn.





Patient reporting ongoing mild pain in her right lower extremity. She was 

unable to describe pain but reported the pain was exacerbated by movement and 

touch. Physical therapy and occupational therapy continue to follow; she has 

been accepted [facility] upon discharge.


.





Advance Directives


Living Will:  Never completed


Health Care Surrogate:  Never completed


Durable Power of :  Never completed


Advance Directive Specifics


Date completed:


According to the patient, she has never completed an advanced directive.


.


Health Care Surrogate(s):


According to patient, she has never designated in writing a health care 

surrogate.


.


Documented care wishes:


Per the patient, she has no written documentation of her healthcare preferences/

goals/wishes.


.


Significant change in goals:


Psychiatry has Betsy acted the patient who will be transferred to the [facility] psych 

floor.


.





Objective





Vital Signs








  Date Time  Temp Pulse Resp B/P (MAP) Pulse Ox O2 Delivery O2 Flow Rate FiO2


 


12/28/17 12:00 97.3 81 17 140/75 (96) 97   


 


12/28/17 08:03     96 Nasal Cannula 2.00 


 


12/28/17 08:00 97.2 93 18 145/83 (103) 95   


 


12/28/17 00:00 96.4 81 20 110/60 (77) 94   


 


12/27/17 20:05      Nasal Cannula 2.00 


 


12/27/17 20:00 97.9 91 20 124/67 (86) 94   


 


12/27/17 16:00 97.5 100 16 110/65 (80) 90   


 


12/27/17 15:25     91   21














Intake & Output  


 


 12/28/17 12/28/17





 07:00 19:00


 


Intake Total 597.5 ml 


 


Output Total 1300 ml 60 ml


 


Balance -702.5 ml -60 ml


 


  


 


Intake Oral 240 ml 


 


IV Total 357.5 ml 


 


Output Urine Total 1300 ml 


 


Drainage Total  60 ml


 


# Bowel Movements 0 





.


Physical Exam


CONSTITUTIONAL/GENERAL: This is a thin, pale, elderly female who is in no 

apparent distress.


TUBES/LINES/DRAINS: Catheter; peripheral IV


SKIN: No jaundice, rashes, or lesions.  Ecchymosis to bilateral upper 

extremities. Skin temperature appropriate. Not diaphoretic. 


HEAD: Atraumatic. Normocephalic.


EYES: Pupils equal and round and reactive. Extraocular motions intact. No 

scleral icterus. No injection or drainage. Fundi not examined.


ENT: Hearing grossly normal. Nose without bleeding or purulent drainage. 


NECK: Trachea midline. 


CARDIOVASCULAR: Regular rate and rhythm.  No murmurs, gallops, or rubs. No JVD.

  


RESPIRATORY/CHEST: Symmetric, unlabored respirations. Breath sounds equal 

bilaterally but diminished throughout. No wheezes, rales, or rhonchi.  


GASTROINTESTINAL: Abdomen soft, non-tender, nondistended. No guarding. Bowel 

sounds present.


GENITOURINARY: Without palpable bladder distension. Catheter in place.


MUSCULOSKELETAL: No obvious deformities.  No clubbing, cyanosis or edema


NEUROLOGICAL: Awake and alert. Motor and sensory grossly within normal limits. 

Follows commands.  Confused.  Moves all extremities.


PSYCHIATRIC: Flat affect, disorganized, intermittently agitated


.





Diagnostic Tests


Laboratory





Laboratory Tests








Test


  12/25/17


16:45 12/25/17


18:45 12/26/17


03:42 12/26/17


09:16


 


Lactic Acid Level


  1.5 mmol/L


(0.4-2.0) 


  


  


 


 


Urine Color


  


  YELLOW


(YELLW/STRAW) 


  


 


 


Urine Turbidity  HAZY (CLEAR)   


 


Urine pH  6.0 (5.0-8.5)   


 


Urine Specific Gravity


  


  1.012


(1.002-1.035) 


  


 


 


Urine Protein


  


  TRACE mg/dL


(NEG-TRACE) 


  


 


 


Urine Glucose (UA)


  


  NEG mg/dL


(NEG) 


  


 


 


Urine Ketones


  


  NEG mg/dL


(NEG) 


  


 


 


Urine Occult Blood  TRACE (NEG)   


 


Urine Nitrite  NEG (NEG)   


 


Urine Bilirubin  NEG (NEG)   


 


Urine Urobilinogen


  


  LESS THAN 2.0


MG/DL (LESS 


  


 


 


Urine Leukocyte Esterase  LARGE (NEG)   


 


Urine RBC  22 /hpf (0-3)   


 


Urine WBC  182 /hpf (0-5)   


 


Urine WBC Clumps  RARE (NONE)   


 


Urine Squamous Epithelial


Cells 


  <1 /hpf (0-5) 


  


  


 


 


Urine Transitional Epithelial


Cells 


  <1 /hpf (NONE) 


  


  


 


 


Urine Bacteria


  


  OCC /hpf


(NONE) 


  


 


 


Urine Mucus  FEW /lpf (OCC)   


 


Urine Yeast with Hyphae  FEW (NONE)   


 


Urine Yeast (Budding)  MANY (NONE)   


 


Microscopic Urinalysis Comment


  


  CULTURE


INDICATED 


  


 


 


White Blood Count


  


  


  17.7 TH/MM3


(4.0-11.0) 


 


 


Red Blood Count


  


  


  6.11 MIL/MM3


(4.00-5.30) 


 


 


Hemoglobin


  


  


  13.9 GM/DL


(11.6-15.3) 


 


 


Hematocrit


  


  


  42.2 %


(35.0-46.0) 


 


 


Mean Corpuscular Volume


  


  


  69.1 FL


(80.0-100.0) 


 


 


Mean Corpuscular Hemoglobin


  


  


  22.7 PG


(27.0-34.0) 


 


 


Mean Corpuscular Hemoglobin


Concent 


  


  32.9 %


(32.0-36.0) 


 


 


Red Cell Distribution Width


  


  


  19.4 %


(11.6-17.2) 


 


 


Platelet Count


  


  


  946 TH/MM3


(150-450) 


 


 


Mean Platelet Volume


  


  


  8.8 FL


(7.0-11.0) 


 


 


Neutrophils (%) (Auto)


  


  


  86.7 %


(16.0-70.0) 


 


 


Lymphocytes (%) (Auto)


  


  


  4.3 %


(9.0-44.0) 


 


 


Monocytes (%) (Auto)


  


  


  5.1 %


(0.0-8.0) 


 


 


Eosinophils (%) (Auto)


  


  


  3.0 %


(0.0-4.0) 


 


 


Basophils (%) (Auto)


  


  


  0.9 %


(0.0-2.0) 


 


 


Neutrophils # (Auto)


  


  


  15.3 TH/MM3


(1.8-7.7) 


 


 


Lymphocytes # (Auto)


  


  


  0.8 TH/MM3


(1.0-4.8) 


 


 


Monocytes # (Auto)


  


  


  0.9 TH/MM3


(0-0.9) 


 


 


Eosinophils # (Auto)


  


  


  0.5 TH/MM3


(0-0.4) 


 


 


Basophils # (Auto)


  


  


  0.2 TH/MM3


(0-0.2) 


 


 


CBC Comment   DIFF FINAL  


 


Differential Comment     


 


Activated Partial


Thromboplast Time 


  


  


  30.2 SEC


(24.3-30.1)


 


Potassium Level


  


  


  


  3.5 MEQ/L


(3.5-5.1)


 


Test


  12/26/17


15:36 12/27/17


05:29 12/27/17


22:20 12/28/17


03:46


 


White Blood Count


  24.2 TH/MM3


(4.0-11.0) 18.0 TH/MM3


(4.0-11.0) 


  20.7 TH/MM3


(4.0-11.0)


 


Red Blood Count


  6.22 MIL/MM3


(4.00-5.30) 5.62 MIL/MM3


(4.00-5.30) 


  6.26 MIL/MM3


(4.00-5.30)


 


Hemoglobin


  13.7 GM/DL


(11.6-15.3) 12.6 GM/DL


(11.6-15.3) 


  13.6 GM/DL


(11.6-15.3)


 


Hematocrit


  43.0 %


(35.0-46.0) 39.0 %


(35.0-46.0) 


  43.6 %


(35.0-46.0)


 


Mean Corpuscular Volume


  69.2 FL


(80.0-100.0) 69.3 FL


(80.0-100.0) 


  69.7 FL


(80.0-100.0)


 


Mean Corpuscular Hemoglobin


  22.0 PG


(27.0-34.0) 22.4 PG


(27.0-34.0) 


  21.7 PG


(27.0-34.0)


 


Mean Corpuscular Hemoglobin


Concent 31.8 %


(32.0-36.0) 32.3 %


(32.0-36.0) 


  31.1 %


(32.0-36.0)


 


Red Cell Distribution Width


  18.6 %


(11.6-17.2) 18.8 %


(11.6-17.2) 


  19.3 %


(11.6-17.2)


 


Platelet Count


  1085 TH/MM3


(150-450) 866 TH/MM3


(150-450) 


  1045 TH/MM3


(150-450)


 


Mean Platelet Volume


  8.4 FL


(7.0-11.0) 8.4 FL


(7.0-11.0) 


  8.7 FL


(7.0-11.0)


 


Neutrophils (%) (Auto)


  88.1 %


(16.0-70.0) 88.2 %


(16.0-70.0) 


  87.8 %


(16.0-70.0)


 


Lymphocytes (%) (Auto)


  3.4 %


(9.0-44.0) 4.5 %


(9.0-44.0) 


  4.1 %


(9.0-44.0)


 


Monocytes (%) (Auto)


  5.3 %


(0.0-8.0) 4.4 %


(0.0-8.0) 


  4.6 %


(0.0-8.0)


 


Eosinophils (%) (Auto)


  2.7 %


(0.0-4.0) 2.5 %


(0.0-4.0) 


  2.4 %


(0.0-4.0)


 


Basophils (%) (Auto)


  0.5 %


(0.0-2.0) 0.4 %


(0.0-2.0) 


  1.1 %


(0.0-2.0)


 


Neutrophils # (Auto)


  21.4 TH/MM3


(1.8-7.7) 15.9 TH/MM3


(1.8-7.7) 


  18.1 TH/MM3


(1.8-7.7)


 


Lymphocytes # (Auto)


  0.8 TH/MM3


(1.0-4.8) 0.8 TH/MM3


(1.0-4.8) 


  0.9 TH/MM3


(1.0-4.8)


 


Monocytes # (Auto)


  1.3 TH/MM3


(0-0.9) 0.8 TH/MM3


(0-0.9) 


  0.9 TH/MM3


(0-0.9)


 


Eosinophils # (Auto)


  0.7 TH/MM3


(0-0.4) 0.5 TH/MM3


(0-0.4) 


  0.5 TH/MM3


(0-0.4)


 


Basophils # (Auto)


  0.1 TH/MM3


(0-0.2) 0.1 TH/MM3


(0-0.2) 


  0.2 TH/MM3


(0-0.2)


 


CBC Comment AUTO DIFF  AUTO DIFF   DIFF FINAL 


 


Differential Total Cells


Counted 100 


  100 


  


  


 


 


Neutrophils % (Manual) 79 % (16-70)  81 % (16-70)   


 


Band Neutrophils % 6 % (0-6)  5 % (0-6)   


 


Lymphocytes % 2 % (9-44)  3 % (9-44)   


 


Monocytes % 8 % (0-8)  3 % (0-8)   


 


Eosinophils % 4 % (0-4)  4 % (0-4)   


 


Basophils % 1 % (0-2)  2 % (0-2)   


 


Neutrophils # (Manual)


  20.6 TH/MM3


(1.8-7.7) 15.8 TH/MM3


(1.8-7.7) 


  


 


 


Differential Comment


  FINAL DIFF


MANUAL FINAL DIFF


MANUAL 


   


 


 


Platelet Estimate HIGH (NORMAL)  HIGH (NORMAL)   


 


Platelet Morphology Comment


  ENLARGED


(NORMAL) ENLARGED


(NORMAL) 


  


 


 


Ovalocytes 1+ (NORMAL)  1+ (NORMAL)   


 


Myelocytes  2 % (0-0)   


 


Atypical Lymphocytes   % (0-0)   


 


Ashley Cells  1+ (NORMAL)   


 


Keratocytes  OCC (NORMAL)   


 


Blood Urea Nitrogen  6 MG/DL (7-18)   5 MG/DL (7-18) 


 


Creatinine


  


  0.54 MG/DL


(0.50-1.00) 


  0.51 MG/DL


(0.50-1.00)


 


Random Glucose


  


  89 MG/DL


() 


  86 MG/DL


()


 


Calcium Level


  


  8.0 MG/DL


(8.5-10.1) 


  8.2 MG/DL


(8.5-10.1)


 


Magnesium Level


  


  2.1 MG/DL


(1.5-2.5) 


  


 


 


Sodium Level


  


  138 MEQ/L


(136-145) 


  139 MEQ/L


(136-145)


 


Potassium Level


  


  3.4 MEQ/L


(3.5-5.1) 


  3.7 MEQ/L


(3.5-5.1)


 


Chloride Level


  


  104 MEQ/L


() 


  104 MEQ/L


()


 


Carbon Dioxide Level


  


  27.1 MEQ/L


(21.0-32.0) 


  25.4 MEQ/L


(21.0-32.0)


 


Anion Gap


  


  7 MEQ/L (5-15) 


  


  10 MEQ/L


(5-15)


 


Estimat Glomerular Filtration


Rate 


  110 ML/MIN


(>89) 


  117 ML/MIN


(>89)


 


Vancomycin Level Trough


  


  


  10.7 MCG/ML


(5.0-10.0) 


 





.


Result Diagram:  


12/28/17 0346                                                                  

              12/28/17 0346





Microbiology





Microbiology








 Date/Time


Source Procedure


Growth Status


 


 


 12/25/17 16:45


Blood Peripheral Aerobic Blood Culture - Preliminary


NO GROWTH IN 3 DAYS Resulted


 


 12/25/17 16:45


Blood Peripheral Anaerobic Blood Culture - Preliminary


NO GROWTH IN 3 DAYS Resulted





 12/25/17 16:40


Blood Peripheral Aerobic Blood Culture - Preliminary


NO GROWTH IN 3 DAYS Resulted


 


 12/25/17 16:40


Blood Peripheral Anaerobic Blood Culture - Preliminary


NO GROWTH IN 3 DAYS Resulted


 


 12/25/17 18:45


Urine Clean Catch Urine Culture - Final


Candida Albicans Complete





 12/25/17 18:45


Urine Clean Catch Legionella Antigen - Final


PRESUMPTIVE NEGATIVE FOR LEGIONELLA P... Complete


 


 12/25/17 18:45


Urine Clean Catch Streptococcus pneumoniae Antigen (M - Final


PRESUMPTIVE NEGATIVE FOR STREPTOCOCCU... Complete











Assessment and Plan


Disease Oriented Problem List:  


(1) Sepsis


(2) UTI (urinary tract infection)


(3) Peripheral vascular disease


(4) Microcytosis


(5) Polycythemia


(6) Thrombocytosis


(7) Hypoalbuminemia


(8) Elevated brain natriuretic peptide (BNP) level


(9) Stroke


Symptom Scale:  


(1) Pain


0-10 Scale:  Unable to quantify


Comment:  See assessment





(2) Dyspnea


0-10 Scale:  Unable to quantify


Comment:  See assessment





(3) Generalized weakness


0-10 Scale:  Unable to quantify


Comment:  See assessment





Pertinent Non-Medical Issues


Psychosocial:  Reportedly lives with daughter in crowded, cluttered apartment. 

6 children altogether but patient can't tell me what state they are in.


Spiritual:  [employer]. Not particularly interested in chaplaincy visits.


Legal: No known advance directives. 


Ethical issues impacting care: Patient's capacity is questionable. Recommend at 

least shared decision making until she is cognitively clearer or we see that 

goals/preferences are consistent. 


.


Important Contacts


Mariam Jameson (daughter)  :   ? phone number


.


Prognosis


It is unclear to what extent patient's current status is due to her peripheral 

vascular disease and to what extent there are other factors. Some of her 

confusion be due to vascular dementia (she is post stroke).  Patient has been 

accepted to [facility] for rehab s/p vascular surgery. She risk for 

continued complications/setbacks.


Code Status:  Full Code


Plan


==  Decision making:  Patient still does not have capacity to make medical 

decisions.


==  FULL CODE 


==  AGGRESSIVE goals


==  Symptoms:


* Right lower extremity pain: Patient reporting ongoing mild pain in her right 

lower extremity. She was unable to describe pain but reported the pain was 

exacerbated by movement and touch. PT/OT reporting patient having pain with 

passive ROM but is able to bear weight without pain.


* Generalized weakness:  Both weakness and right lower extremity pain seemed to 

make her bedbound in the days leading up to hospital admission.  She still has 

some residual weakness from her stroke in 2012. Physical therapy and 

occupational therapy continue to follow; she has been accepted for admission to 

[facility] SN[facility] for rehabilitation upon discharge.


* Confusion: Unclear to what extent this is acute due to her clinical condition 

or is more chronic.  CT head on 12/18/2017 showed suspected areas of 

encephalomalacia bilaterally; suspected small vessel ischemic changes 

throughout the white matter and atrophy.


== Patient reportedly lives with her daughter (Mariam), but she has been 

unreachable. Accurints report was requested on 12/19/17 for daughter, Mariam Jameson. [employer] did  a wellness check but they were 

unable to locate the patient's daughter. Case management has been requested to 

rerun an accurints for the patient's daughter under the name Mariam Cormier.





== Patient is withdrawn, disorganized and confused on exam. She had no insight 

or judgment related to her current hospitalization. Patient becomes agitated at 

times when being asked questions but is not aggressive. Psychiatry was 

consulted to reevaluate the patient.  Given the patient's limited self 

neglecting behavior and current medical status, psychiatry Meyer acted the 

patient who will be transferred to the [facility] psych floor. Started on Seroquel 

12.5 mg PO 2 times daily.





==  If patient becomes cognitively clear, we will try and get her to complete 

health care surrogate forms and we will re-visit resuscitation wishes.  If she 

doesn't clear, we need to identify the appropriate proxy or proxies and speak 

to them about resuscitation status and goals.





==  Palliative care will continue to follow to assist with symptom management 

and to further clarify goals of medical treatment as the clinical course 

evolves.  Discussed with patient's nurse and Dr. Schafer.





Attestation


To help prompt me to consider important information that might be impacting 

today's encounter and assessment, information from prior notes written by 

myself or my colleagues may have been "brought forward" into today's note.  My 

signature on this note, however, is an attestation that I personally performed 

the exam, history, and/or decision-making noted today, and, unless otherwise 

indicated, the interactions with patient, family, and staff as well as the 

review of records all occurred today.  I also attest that the listed assessment 

and stated plan reflect my best clinical judgment today based on the 

combination of historical information, prior notes, and today's exam/ 

interactions.  When time spent is documented, it refers only to time spent 

today by the signer, or if indicated, combined time spent today by 

collaborating physician/nurse practitioner.


.











Ruchi Tucker Dec 28, 2017 14:30

## 2017-12-28 NOTE — PD.CARD.PN
Subjective


Subjective Remarks


No SOB or CP, no new c/o





Objective


Medications





Current Medications








 Medications


  (Trade)  Dose


 Ordered  Sig/Carlitos


 Route  Start Time


 Stop Time Status Last Admin


 


  (NS Flush)  2 ml  UNSCH  PRN


 IV FLUSH  12/17/17 16:00


     


 


 


  (NS Flush)  2 ml  BID


 IV FLUSH  12/17/17 21:00


    12/28/17 08:30


 


 


  (Tylenol)  650 mg  Q4H  PRN


 PO  12/17/17 16:00


    12/27/17 20:01


 


 


  (Zofran Inj)  4 mg  Q6H  PRN


 IVP  12/17/17 16:00


     


 


 


  (Restoril)  15 mg  HS  PRN


 PO  12/17/17 16:00


     


 


 


  (Narcan Inj)  0.4 mg  UNSCH  PRN


 IV PUSH  12/17/17 16:00


     


 


 


  (Tiarra-Colace)  1 tab  BID


 PO  12/17/17 21:00


    12/28/17 08:30


 


 


  (Milk Of


 Magnesia Liq)  30 ml  Q12H  PRN


 PO  12/17/17 16:00


     


 


 


  (Senokot)  17.2 mg  Q12H  PRN


 PO  12/17/17 16:00


     


 


 


  (Dulcolax Supp)  10 mg  DAILY  PRN


 RECTAL  12/17/17 16:00


     


 


 


  (Lactulose Liq)  30 ml  DAILY  PRN


 PO  12/17/17 16:00


     


 


 


 Lactated Ringer's  1,000 ml @ 


 40 mls/hr  Q24H


 IV  12/20/17 13:00


    12/27/17 21:19


 


 


  (Plavix)  75 mg  DAILY


 PO  12/22/17 17:15


    12/28/17 08:29


 


 


  (Duoneb Neb)  1 ampule  QID  NEB


 NEB  12/24/17 16:00


    12/28/17 08:01


 


 


  (Lanoxin)  0.125 mg  DAILY


 PO  12/24/17 17:00


    12/28/17 08:29


 


 


  (Lopressor)  25 mg  Q12HR


 PO  12/25/17 21:00


    12/28/17 08:29


 


 


 Piperacillin Sod/


 Tazobactam Sod  100 ml @ 


 200 mls/hr  Q6H


 IV  12/25/17 15:00


    12/28/17 08:30


 


 


 Pharmacy Profile


 Note  0 ml @ 0


 mls/hr  UNSCH


 OTHER  12/25/17 13:45


     


 


 


  (Duoneb Neb)  1 ampule  Q4HR NEB  PRN


 INH  12/25/17 13:45


     


 


 


  (Lipitor)  80 mg  HS


 PO  12/27/17 21:00


    12/27/17 20:00


 


 


 Vancomycin HCl


 1000 mg/Sodium


 Chloride  250 ml @ 


 250 mls/hr  Q18H


 IV  12/28/17 11:00


    12/28/17 10:17


 


 


 Miscellaneous


 Information  SPECIFIC LAB TO BE


 DRAWN:VANCOMYCIN


 TROUGH DATE TO...  ONCE  ONCE


 .XX  12/30/17 16:45


 12/30/17 16:46   


 


 


  (SEROquel)  12.5 mg  BID@09,12


 PO  12/29/17 09:00


     


 


 


  (Pill Splitter)  1 ea  UNSCH  PRN


 OTHER  12/28/17 13:30


     


 








Vital Signs / I&O





Vital Signs








  Date Time  Temp Pulse Resp B/P (MAP) Pulse Ox O2 Delivery O2 Flow Rate FiO2


 


12/28/17 12:00 97.3 81 17 140/75 (96) 97   


 


12/28/17 08:03     96 Nasal Cannula 2.00 


 


12/28/17 08:00 97.2 93 18 145/83 (103) 95   


 


12/28/17 00:00 96.4 81 20 110/60 (77) 94   


 


12/27/17 20:05      Nasal Cannula 2.00 


 


12/27/17 20:00 97.9 91 20 124/67 (86) 94   


 


12/27/17 16:00 97.5 100 16 110/65 (80) 90   


 


12/27/17 15:25     91   21














I/O      


 


 12/27/17 12/27/17 12/27/17 12/28/17 12/28/17 12/28/17





 06:59 14:59 22:59 06:59 14:59 22:59


 


Intake Total 802 ml  500 ml 497.5 ml  


 


Output Total 345 ml  500 ml 1300 ml 60 ml 


 


Balance 457 ml  0 ml -802.5 ml -60 ml 


 


      


 


Intake Oral 100 ml  300 ml 240 ml  


 


IV Total 702 ml  200 ml 257.5 ml  


 


Output Urine Total 300 ml  500 ml 1300 ml  


 


Drainage Total 45 ml    60 ml 


 


# Bowel Movements 0  0 0  








Physical Exam


GENERAL: In NAD


SKIN: Warm and dry.


HEAD: Normocephalic.


EYES: No scleral icterus. No injection or drainage. 


NECK: Supple, trachea midline. No JVD or lymphadenopathy.


CARDIOVASCULAR: Irregular, without murmurs, gallops, or rubs. 


RESPIRATORY: Breath sounds equal bilaterally. Few rhonchi.


GASTROINTESTINAL: Abdomen soft, non-tender, nondistended. 


MUSCULOSKELETAL: No cyanosis, or edema.





Laboratory





Laboratory Tests








Test


  12/27/17


22:20 12/28/17


03:46


 


Vancomycin Level Trough 10.7 MCG/ML  


 


White Blood Count  20.7 TH/MM3 


 


Red Blood Count  6.26 MIL/MM3 


 


Hemoglobin  13.6 GM/DL 


 


Hematocrit  43.6 % 


 


Mean Corpuscular Volume  69.7 FL 


 


Mean Corpuscular Hemoglobin  21.7 PG 


 


Mean Corpuscular Hemoglobin


Concent 


  31.1 % 


 


 


Red Cell Distribution Width  19.3 % 


 


Platelet Count  1045 TH/MM3 


 


Mean Platelet Volume  8.7 FL 


 


Neutrophils (%) (Auto)  87.8 % 


 


Lymphocytes (%) (Auto)  4.1 % 


 


Monocytes (%) (Auto)  4.6 % 


 


Eosinophils (%) (Auto)  2.4 % 


 


Basophils (%) (Auto)  1.1 % 


 


Neutrophils # (Auto)  18.1 TH/MM3 


 


Lymphocytes # (Auto)  0.9 TH/MM3 


 


Monocytes # (Auto)  0.9 TH/MM3 


 


Eosinophils # (Auto)  0.5 TH/MM3 


 


Basophils # (Auto)  0.2 TH/MM3 


 


CBC Comment  DIFF FINAL 


 


Differential Comment   


 


Blood Urea Nitrogen  5 MG/DL 


 


Creatinine  0.51 MG/DL 


 


Random Glucose  86 MG/DL 


 


Calcium Level  8.2 MG/DL 


 


Sodium Level  139 MEQ/L 


 


Potassium Level  3.7 MEQ/L 


 


Chloride Level  104 MEQ/L 


 


Carbon Dioxide Level  25.4 MEQ/L 


 


Anion Gap  10 MEQ/L 


 


Estimat Glomerular Filtration


Rate 


  117 ML/MIN 


 











Assessment and Plan


Problem List:  


(1) Peripheral vascular disease


ICD Codes:  I73.9 - Peripheral vascular disease, unspecified


Status:  Acute


(2) CAD (coronary artery disease)


ICD Codes:  I25.10 - Atherosclerotic heart disease of native coronary artery 

without angina pectoris


(3) Cardiomyopathy


ICD Codes:  I42.9 - Cardiomyopathy, unspecified


(4) Atrial fibrillation


ICD Codes:  I48.91 - Unspecified atrial fibrillation


(5) Hypertension


ICD Codes:  I10 - Essential (primary) hypertension


Assessment and Plan


No new cardiac issues. Remains stable from cardiac standpoint after surgery. No 

angina or CHF exacerbation. Continue current program including risk factor 

modification. Increase activity, PT. Transfer to med/psych as planned.











Gonzalo Gerard MD Dec 28, 2017 14:58

## 2017-12-29 VITALS — DIASTOLIC BLOOD PRESSURE: 100 MMHG | SYSTOLIC BLOOD PRESSURE: 172 MMHG

## 2017-12-29 VITALS
DIASTOLIC BLOOD PRESSURE: 70 MMHG | HEART RATE: 70 BPM | TEMPERATURE: 97.4 F | SYSTOLIC BLOOD PRESSURE: 153 MMHG | RESPIRATION RATE: 15 BRPM | OXYGEN SATURATION: 98 %

## 2017-12-29 VITALS
RESPIRATION RATE: 17 BRPM | SYSTOLIC BLOOD PRESSURE: 192 MMHG | OXYGEN SATURATION: 93 % | TEMPERATURE: 97.6 F | DIASTOLIC BLOOD PRESSURE: 99 MMHG

## 2017-12-29 VITALS
RESPIRATION RATE: 20 BRPM | DIASTOLIC BLOOD PRESSURE: 82 MMHG | HEART RATE: 100 BPM | TEMPERATURE: 98.2 F | SYSTOLIC BLOOD PRESSURE: 161 MMHG | OXYGEN SATURATION: 93 %

## 2017-12-29 VITALS — HEART RATE: 94 BPM | SYSTOLIC BLOOD PRESSURE: 157 MMHG | DIASTOLIC BLOOD PRESSURE: 74 MMHG

## 2017-12-29 LAB
BASOPHILS # BLD AUTO: 0.1 TH/MM3 (ref 0–0.2)
BASOPHILS NFR BLD: 0.3 % (ref 0–2)
COLOR UR: YELLOW
EOSINOPHIL # BLD: 0.3 TH/MM3 (ref 0–0.4)
EOSINOPHIL NFR BLD: 0.9 % (ref 0–4)
ERYTHROCYTE [DISTWIDTH] IN BLOOD BY AUTOMATED COUNT: 20.1 % (ref 11.6–17.2)
GLUCOSE UR STRIP-MCNC: (no result) MG/DL
HCT VFR BLD CALC: 51 % (ref 35–46)
HGB BLD-MCNC: 16 GM/DL (ref 11.6–15.3)
HGB UR QL STRIP: (no result)
KETONES UR STRIP-MCNC: 10 MG/DL
LYMPHOCYTES # BLD AUTO: 1.2 TH/MM3 (ref 1–4.8)
LYMPHOCYTES NFR BLD AUTO: 3.2 % (ref 9–44)
LYMPHOCYTES: 1 % (ref 9–44)
MCH RBC QN AUTO: 22.1 PG (ref 27–34)
MCHC RBC AUTO-ENTMCNC: 31.4 % (ref 32–36)
MCV RBC AUTO: 70.4 FL (ref 80–100)
MONOCYTE #: 1.3 TH/MM3 (ref 0–0.9)
MONOCYTES NFR BLD: 3.6 % (ref 0–8)
MONOCYTES: 1 % (ref 0–8)
MUCOUS THREADS #/AREA URNS LPF: (no result) /LPF
NEUTROPHILS # BLD AUTO: 32.8 TH/MM3 (ref 1.8–7.7)
NEUTROPHILS NFR BLD AUTO: 92 % (ref 16–70)
NEUTS BAND # BLD MANUAL: 35 TH/MM3 (ref 1.8–7.7)
NEUTS BAND NFR BLD: 1 % (ref 0–6)
NEUTS SEG NFR BLD MANUAL: 96 % (ref 16–70)
NITRITE UR QL STRIP: (no result)
PLATELET # BLD: 1674 TH/MM3 (ref 150–450)
PMV BLD AUTO: 8.6 FL (ref 7–11)
PROMYELOCYTES NFR BLD: 1 % (ref 0–0)
RBC # BLD AUTO: 7.25 MIL/MM3 (ref 4–5.3)
SP GR UR STRIP: 1.02 (ref 1–1.03)
URINE LEUKOCYTE ESTERASE: (no result)
WBC # BLD AUTO: 35.7 TH/MM3 (ref 4–11)

## 2017-12-29 RX ADMIN — DIGOXIN SCH MG: 125 TABLET ORAL at 11:01

## 2017-12-29 RX ADMIN — METOPROLOL TARTRATE SCH MG: 25 TABLET, FILM COATED ORAL at 11:01

## 2017-12-29 RX ADMIN — ATORVASTATIN CALCIUM SCH MG: 80 TABLET, FILM COATED ORAL at 21:00

## 2017-12-29 RX ADMIN — SODIUM CHLORIDE SCH MLS/HR: 900 INJECTION INTRAVENOUS at 11:03

## 2017-12-29 RX ADMIN — CLOPIDOGREL BISULFATE SCH MG: 75 TABLET, FILM COATED ORAL at 11:01

## 2017-12-29 RX ADMIN — METOPROLOL TARTRATE SCH MG: 25 TABLET, FILM COATED ORAL at 21:00

## 2017-12-29 RX ADMIN — SODIUM CHLORIDE SCH MLS/HR: 900 INJECTION INTRAVENOUS at 13:59

## 2017-12-29 NOTE — HHI.HP
Provisional Diagnosis


Admission Date


Dec 28, 2017 at 16:37


Axis I.


Dementia associated with other underlying diseases f 02.80, other Alzheimer's 

disease G30.8





                               Certification of Person's Competence 


                           To Provide Express and Informed Consent





I have personally examined Izabella Leonard , a person being served at [facility] on, Dec 29, 2017 09:24.


Express and informed consent means consent voluntarily given in writing, by a 

competent person, after sufficient explanation and disclosure of the subject 

matter involved to enable the person to make a knowing and willful decision 

without any element of force, fraud, deceit, duress, or other form of 

constraint or coercion.





This person is 18 years of age or older, is not now known to be incompetent to 

consent to treatment with a guardian advocate, and does not have a health care 

surrogate or proxy currently making medical treatment decisions.  I have found 

this person to be one of the following:





[] Competent to provide express and informed consent, as defined above, for 

voluntary admission to this facility and is competent to provide express and 

informed consent for treatment.  He/she has the consistent capacity to make 

well reasoned, willful, and knowing decisions concerning his or her medical or 

mental health treatment.  The person fully and consistently understands the 

purpose of the admission for examination/placement and is fully capable of 

personally exercising all rights [place]





[xxxx] Incompetent to provide express and informed consent to voluntary 

admission, and this is incompetent to provide express and informed consent to 

treatment.  The person must be transferred to involuntary status and a petition 

for a guardian advocate filed with the Circuit Court.





[] Refusing to provide express and informed consent to voluntary admission but 

is competent to provide express and informed consent for treatment.  The person 

must be discharged or transferred to involuntary status.





Form shall be completed within 24 hours of a person's arrival at the receiving 

facility and filed in the clinical record of each person:


1. Admitted on a voluntary basis


2. Permitted to provide express and informed consent to his/her own treatment


3. Allowed to transfer from involuntary to voluntary status


4. Prior to permitting a person to consent to his or her own treatment after 

having been previously found incompetent to consent to treatment.





History of Present Illness


Capacity:  Lacks Capacity


Psych Chief Complaint:  patient confused unable to care for self found lying in 

her own waste produ


HPI


Patient is a 77-year-old white female who was initially brought to the 

emergency department on 12/17/17 under visit #[id number] she was found be 

markedly debilitated, significant peripheral vascular problems necessitating 

femoral-popliteal bypass.  Patient is also seen by psychiatry during that visit 

results he had cognitive issues was unable care for self.  Patient seen by both 

Dr. Mckeon and Dr. Zavaleta.  Patient was transferred to  E. for further care 

and attention.  Dr. Zavaleta did initiate a Meyer act dated 12/28/17 dating 

unspecified psychosis that document reviewed and agreed with.  At the present 

time patient laying quietly in her bed on 4 E. RN present throughout session.  

Patient is alert diffusely confused elderly white female she doesn't know she 

is in the hospital devastated to another batch she does not know the state she 

is vague about the year does not know the month or the holiday.  She states she 

lives with her daughter but does not know where her daughter is she is vague 

about the circumstances leading to her being left on the couch for extended 

period of time in her feces and urine.  She states her daughter lives with her.

  She states she has been  5 or 6 times, has had 5 or 6 children, has 

grandchildren and great-grandchildren but she has no great great grandchildren 

yet because "I aint dead yet".  Patient denies suicidality denies voices or 

visions.  Patient denies any prior psychiatric contact hospitalizations his 

psychotropic medications.  She states she is from [place] and as a younger 

woman worked in "the [employer]".  Patient denies any alcohol use or drug use, 

though she did somewhat shyly acknowledge past use of marijuana, also stated 

she was a cigarette smoker in the past but not in a few months.  She does deny 

any physical or sexual abuse nor her historical accuracy is quite questionable.

  In any event at the present time patient does meet criteria for inpatient 

psychiatric assessment and observation.  I feel she does not have capacity to 

make decisions concerning her care or medications thus I'll do first opinion 

petition supporting Meyer act request second opinion.  We'll also ask for 

healthcare surrogate and guardian advocate.  We will have a continuation of the 

hospitalists care for this lady on our med psych unit.  Patient did have 

palliative care orders will continue that also.  Will have PT and OT assess 

this lady.  Will have counselor attempt to reach patient's family to discuss 

further care and attention and possible placement issues we also need to 

address with family members advanced directives





Review of Systems


ROS Limitations:  Clinical Condition, Altered Mental Status





Past Psych History


Psychological trauma history


Unable to ascertain due to patient's cognitive condition


Violence risk - others (6 mos)


Low


Violence risk - self (6 mos)


Low





Substance Abuse History


Drugs/Alcohol past 12 months


Patient denies





Past Family Social History


Coded Allergies:  


     No Known Allergies (Unverified  Allergy, Unknown, 12/17/17)


Active Scripts


Amoxicillin-Clavulanate (Augmentin) 500-125 mg Tab, 500 MG PO BID for Infection

, #10 TAB 0 Refills


   Prov:Hali Augustine MD         12/28/17


Temazepam (Restoril) 15 Mg Cap, 15 MG PO HS Y for INSOMNIA, #20 CAP


   Prov:Hali Augustine MD         12/28/17


Quetiapine (Seroquel) 25 Mg Tab, 12.5 MG PO BID@09,12 for psychosis, #62 TAB


   Prov:Hali Augustine MD         12/28/17


Atorvastatin (Atorvastatin) 80 Mg Tab, 80 MG PO HS for Cholesterol Management, #

31 TAB


   Prov:Hali Augustine MD         12/28/17


Metoprolol Tartrate (Metoprolol Tartrate) 25 Mg Tab, 25 MG PO Q12HR for afib, #

62 TAB


   Prov:Hali Augustine MD         12/28/17


Digoxin (Digoxin) 0.125 Mg Tab, 0.125 MG PO DAILY for afib, #31 TAB


   Prov:Hali Augustine MD         12/28/17


Clopidogrel (Plavix) 75 Mg Tab, 75 MG PO DAILY for pad, #31 TAB


   Prov:Hali Augustine MD         12/28/17





Current Medications








 Medications


  (Trade)  Dose


 Ordered  Sig/Carlitos


 Route  Start Time


 Stop Time Status Last Admin


 


  (Augmentin)  500 mg  BID


 PO  12/28/17 21:00


     


 


 


  (Lipitor)  80 mg  HS


 PO  12/28/17 21:00


     


 


 


  (Plavix)  75 mg  DAILY


 PO  12/28/17 17:30


     


 


 


  (Lanoxin)  0.125 mg  DAILY


 PO  12/28/17 17:30


     


 


 


  (Lopressor)  25 mg  Q12HR


 PO  12/28/17 21:00


    12/28/17 20:56


 


 


  (SEROquel)  12.5 mg  BID@09,12


 PO  12/29/17 09:00


     


 


 


  (Tylenol)  650 mg  Q4H  PRN


 PO  12/28/17 17:30


     


 


 


  (Milk Of


 Magnesia Liq)  30 ml  DAILY  PRN


 PO  12/28/17 17:30


     


 


 


  (Mag-Al Plus


 Susp Liq)  30 ml  Q6H  PRN


 PO  12/28/17 17:30


     


 


 


  (Atarax)  50 mg  Q6H  PRN


 PO  12/29/17 09:15


     


 








Family Psych History


Unknown at this time due to patient's cognitive condition


Social History


It appears patient lives in an apartment with her daughter


Patient's Strengths (min. 2)


Patient verbal able axis health care





Physical Exam


Patient medically cleared through visit #[id number]


Vital Signs





Vital Signs








  Date Time  Temp Pulse Resp B/P (MAP) Pulse Ox O2 Delivery O2 Flow Rate FiO2


 


12/29/17 05:40 98.2 100 20 161/82 (108) 93   














I/O   


 


 12/29/17 12/29/17 12/30/17





 08:00 16:00 00:00


 


Intake Total 0 ml  


 


Balance 0 ml  











Mental Status Examination


Appearance:  Appropriate


Consciousness:  Alert


Orientation:  Person, Place (vaguely)


Motor Activity:  Other (patient bedridden at this time)


Speech:  Hesitant


Language:  Adequate


Fund of Knowledge:  Inadequate


Attention and Concentration:  Other (poor)


Memory:  Impaired


Mood:  Other (euthymic to somewhat restricted)


Affect:  Other (decreased range and intensity)


Thought Process & Associations:  Loose associations


Thought Content:  Other (disorganized)


Hallucination Type:  None (denies)


Delusion Type:  None


Suicidal Ideation:  No


Suicidal Plan:  No


Suicidal Intention:  No


Homicidal Ideation:  No


Homicidal Plan:  No


Homicidal Intention:  No


Insight:  Poor


Judgment:  Poor





Assessment & Plan


Problem List:  


(1) OTHER ALZHEIMER'S DISEASE


ICD Codes:  G30.8 - OTHER ALZHEIMER'S DISEASE


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 

disturbance


ICD Codes:  F02.80 - Dementia in other diseases classified elsewhere without 

behavioral disturbance


Assessment & Plan


Estimated LOS: 7 days this time patient meets criteria for involuntary 

psychiatric hospitalization the Meyer act I'll do first opinion request second 

opinion.  I will also do health care surrogate and guardian advocate.  We'll 

continue the hospitals consult from the inpatient medical admission, we will 

continue the palliative care consult.  Will also request OT and PT.  Will have 

counselor attempt to reach patient's family to determine further course of 

action.


Discharge Planning


Needs to be discussed with patient's family members and/or health care surrogate

/guardian advocate


Request HC Surrog/Guard Advoc?:  Yes











Emir Shultz MD Dec 29, 2017 09:44

## 2017-12-29 NOTE — PD.PSY.CON
Provisional Diagnosis


Admission Date


Dec 28, 2017 at 16:37


Axis I.


Dementia associated with other underlying diseases f 02.80, other Alzheimer's 

disease G30.8





History of Present Illness


Service


Psychiatry


Consult Requested By


Dr. Shultz


Reason for Consult


Second opinion


Primary Care Physician


No Primary Care Physician


HPI


Patient is a 77-year-old white female who was initially brought to the 

emergency department on 12/17/17 under visit #[id number] she was found be 

markedly debilitated, significant peripheral vascular problems necessitating 

femoral-popliteal bypass.  Patient is also seen by psychiatry during that visit 

results he had cognitive issues was unable care for self.  Patient seen by both 

Dr. Mckeon and Dr. Zavaleta.  Patient was transferred to  E for further care 

and attention.  Dr. Zavaleta did initiate a Meyer act dated 12/28/17 dating 

unspecified psychosis that document reviewed and agreed with.  At the present 

time patient laying quietly in her bed on 4 E. RN present throughout session.  

Patient is alert diffusely confused elderly white female she doesn't know she 

is in the hospital devastated to another batch she does not know the state she 

is vague about the year does not know the month or the holiday.  She states she 

lives with her daughter but does not know where her daughter is she is vague 

about the circumstances leading to her being left on the couch for extended 

period of time in her feces and urine.  She states her daughter lives with her.

  She states she has been  5 or 6 times, has had 5 or 6 children, has 

grandchildren and great-grandchildren but she has no great great grandchildren 

yet because "I aint dead yet".  Patient denies suicidality denies voices or 

visions.  Patient denies any prior psychiatric contact hospitalizations his 

psychotropic medications.  She states she is from [place] and as a younger 

woman worked in "the [employer]".  Patient denies any alcohol use or drug use, 

though she did somewhat shyly acknowledge past use of marijuana, also stated 

she was a cigarette smoker in the past but not in a few months.  She does deny 

any physical or sexual abuse nor her historical accuracy is quite questionable.

  In any event at the present time patient does meet criteria for inpatient 

psychiatric assessment and observation.  I feel she does not have capacity to 

make decisions concerning her care or medications thus I'll do first opinion 

petition supporting Meyer act request second opinion.  We'll also ask for 

healthcare surrogate and guardian advocate.  We will have a continuation of the 

hospitalists care for this lady on our med psych unit.  Patient did have 

palliative care orders will continue that also.  Will have PT and OT assess 

this lady.  Will have counselor attempt to reach patient's family to discuss 

further care and attention and possible placement issues we also need to 

address with family members advanced directives.





Patient was seen today for psychiatric evaluation for second opinion.  She is 

calm, cooperative, but confused and tangential.  Patient reports that she feels 

much better, but is unable to clarify the reason of her hospitalization and 

circumstances that brought her to the hospital.  Patient says that she is okay 

and feels safe here, she doesn't know where she is, she doesn't know the date.  

No agitation or aggressive behavior reported in the last 24 hours.  Patient has 

been compliant with medications, no significant side effects.





Review of Systems


Except as stated in HPI:  all other systems reviewed are Neg





Past Family Social History


Coded Allergies:  


     No Known Allergies (Unverified  Allergy, Unknown, 12/17/17)


Active Scripts


Amoxicillin-Clavulanate (Augmentin) 500-125 mg Tab, 500 MG PO BID for Infection

, #10 TAB 0 Refills


   Prov:Hali Augustine MD         12/28/17


Temazepam (Restoril) 15 Mg Cap, 15 MG PO HS Y for INSOMNIA, #20 CAP


   Prov:Hali Augustine MD         12/28/17


Quetiapine (Seroquel) 25 Mg Tab, 12.5 MG PO BID@09,12 for psychosis, #62 TAB


   Prov:Hali Augustine MD         12/28/17


Atorvastatin (Atorvastatin) 80 Mg Tab, 80 MG PO HS for Cholesterol Management, #

31 TAB


   Prov:Hali Augustine MD         12/28/17


Metoprolol Tartrate (Metoprolol Tartrate) 25 Mg Tab, 25 MG PO Q12HR for afib, #

62 TAB


   Prov:Hali Augustine MD         12/28/17


Digoxin (Digoxin) 0.125 Mg Tab, 0.125 MG PO DAILY for afib, #31 TAB


   Prov:Hali Augustine MD         12/28/17


Clopidogrel (Plavix) 75 Mg Tab, 75 MG PO DAILY for pad, #31 TAB


   Prov:Hali Augustine MD         12/28/17





Current Medications








 Medications


  (Trade)  Dose


 Ordered  Sig/Carlitos


 Route  Start Time


 Stop Time Status Last Admin


 


  (Lipitor)  80 mg  HS


 PO  12/28/17 21:00


     


 


 


  (Plavix)  75 mg  DAILY


 PO  12/28/17 17:30


    12/29/17 11:01


 


 


  (Lanoxin)  0.125 mg  DAILY


 PO  12/28/17 17:30


    12/29/17 11:01


 


 


  (Lopressor)  25 mg  Q12HR


 PO  12/28/17 21:00


    12/29/17 11:01


 


 


  (SEROquel)  12.5 mg  BID@09,12


 PO  12/29/17 09:00


     


 


 


  (Tylenol)  650 mg  Q4H  PRN


 PO  12/28/17 17:30


     


 


 


  (Milk Of


 Magnesia Liq)  30 ml  DAILY  PRN


 PO  12/28/17 17:30


     


 


 


  (Mag-Al Plus


 Susp Liq)  30 ml  Q6H  PRN


 PO  12/28/17 17:30


     


 


 


  (Atarax)  50 mg  Q6H  PRN


 PO  12/29/17 09:15


     


 


 


 Ceftriaxone


 Sodium 1000 mg/


 Sodium Chloride  100 ml @ 


 200 mls/hr  Q24H


 IV  12/29/17 11:00


     


 


 


  (Pill Splitter)  1 ea  UNSCH  PRN


 OTHER  12/29/17 10:30


     


 








Patient's Strengths (min. 2)


Patient verbal able axis health care





Physical Exam


Vital Signs





Vital Signs








  Date Time  Temp Pulse Resp B/P (MAP) Pulse Ox O2 Delivery O2 Flow Rate FiO2


 


12/29/17 05:40 98.2 100 20 161/82 (108) 93   














I/O   


 


 12/29/17 12/29/17 12/30/17





 08:00 16:00 00:00


 


Intake Total 240 ml  


 


Balance 240 ml  








Lab Results











Test


  12/29/17


11:50


 


Urine Color YELLOW 


 


Urine Turbidity HAZY 


 


Urine pH 6.0 


 


Urine Specific Gravity 1.025 


 


Urine Protein 30 mg/dL 


 


Urine Glucose (UA) NEG mg/dL 


 


Urine Ketones 10 mg/dL 


 


Urine Occult Blood NEG 


 


Urine Nitrite NEG 


 


Urine Bilirubin NEG 


 


Urine Urobilinogen


  LESS THAN 2.0


MG/DL


 


Urine Leukocyte Esterase MOD 


 


Urine RBC 10 /hpf 


 


Urine WBC 47 /hpf 


 


Urine Mucus FEW /lpf 


 


Urine Yeast with Hyphae FEW 


 


Urine Yeast (Budding) OCC 


 


Microscopic Urinalysis Comment


  CULTURE


INDICATED














 Date/Time


Source Procedure


Growth Status


 


 


 12/29/17 11:50


Urine Other Urine Culture


Pending Received











Mental Status Examination


Appearance:  Appropriate


Consciousness:  Alert


Orientation:  Person, Place (vaguely)


Motor Activity:  Other (patient bedridden at this time)


Speech:  Hesitant


Language:  Adequate


Fund of Knowledge:  Inadequate


Attention and Concentration:  Other (poor)


Memory:  Impaired


Mood:  Other (euthymic to somewhat restricted)


Affect:  Other (decreased range and intensity)


Thought Process & Associations:  Loose associations


Thought Content:  Other (disorganized)


Hallucination Type:  None (denies)


Delusion Type:  None


Suicidal Ideation:  No


Suicidal Plan:  No


Suicidal Intention:  No


Homicidal Ideation:  No


Homicidal Plan:  No


Homicidal Intention:  No


Insight:  Poor


Judgment:  Poor





Assessment & Plan


Problem List:  


(1) OTHER ALZHEIMER'S DISEASE


ICD Codes:  G30.8 - OTHER ALZHEIMER'S DISEASE


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 

disturbance


ICD Codes:  F02.80 - Dementia in other diseases classified elsewhere without 

behavioral disturbance


Assessment & Plan:  I have seen and examined this patient, documentation was 

reviewed, I agree and concur with Dr. Shultz's assessment and plan.





Assessment & Plan


Estimated LOS:  days


Request HC Surrog/Guard Advoc?:  Yes











Stoney Tee MD Dec 29, 2017 12:31

## 2017-12-29 NOTE — HHI.HCPN
Reason for visit





   a.  To assist with evaluation and management of symptoms including:  pain; 

confusion; generalized weakness, dyspnea


   b.  To assist medical decision maker(s) with: better understanding of 

current medical conditions; weighing benefits/burdens of medical treatment 

options; making medical treatment decisions.


.





Subjective/Interval History


.


Palliative care is familiar with this patient who was hospitalized 12/17/2017 

with sepsis and UTI.  The patient was debilitated with significant peripheral 

vascular disease necessitating femoral-popliteal bypass.  Patient reportedly 

lives with her daughter but she does not know where her daughter is, and the 

daughter has not visited her mother during this hospitalization.  Daughter, 

Mariam, has been unreachable.  Accurints report was requested on 12/19/17 for 

daughter, Mariam Jameson. [place] [employer] did a wellness 

check but they were unable to locate the patient's daughter. Case management 

has been requested to rerun an accurints for the patient's daughter under the 

name Mariam Cormier. Psychiatry was consulted to reevaluate the patient on 12/28/ 17. Patient was withdrawn, disorganized and confused on exam; she became 

intermittently agitated but was not aggressive. Patient became agitated at 

times when being asked questions but is not aggressive.  





Follow up visit for symptom management and education of medical treatment goals.





Patient was Meyer acted on 12/28/17 and transferred to the med-psych floor. She 

remains confused, unable to clarify the reason for her hospitalization. Patient 

had marked leukocytosis on admission and Yeast infection with possible UTI on 12 /25/17. Afebrile. Follow up UA on 12/29/17 suspicious for UTI and/or yeast; 

urine culture pending.  Patient started on Fluconazole and Rocephin. EKG 

ordered to rule out any acute changes related to confusion.





WBC: 35.7, hemoglobin 16.0, hematocrit 51.0, platelets 1674, neutrophils 92.0%





Respirations shallow with diminished air exchange, coarse breath sounds. Oxygen 

saturation is in the low 90s on room air.  Patient received furosemide 40mg IV 

and methylprednisolone 125mg IV 1; DuoNebs ordered every 4 hours PRN or 

wheezing.  Chest x-ray stable appearance of bilateral interstitial infiltrates 

which may represent pulmonary edema and stable small bilateral pleural 

effusions.





Physical therapy following. Patient presenting with weakness in the right lower 

extremity including foot drop,impaired mobility.  Plan for placement at SNF for 

rehabilitation upon discharge.


.





Objective





Vital Signs








  Date Time  Temp Pulse Resp B/P (MAP) Pulse Ox O2 Delivery O2 Flow Rate FiO2


 


12/29/17 05:40 98.2 100 20 161/82 (108) 93   


 


12/28/17 16:45 98.2 98 17 168/79 (108) 92   














Intake & Output  


 


 12/29/17 12/29/17





 06:59 18:59


 


Intake Total 780 ml 240 ml


 


Balance 780 ml 240 ml


 


  


 


Intake Oral 780 ml 240 ml


 


# Voids 4 


 


# Bowel Movements 2 





.


Physical Exam


CONSTITUTIONAL/GENERAL: This is a thin, elderly female who is confused but in 

no apparent distress


TUBES/LINES/DRAINS: PIV


SKIN: No jaundice, rashes, or lesions. Ecchymosis to bilateral upper 

extremities. Skin temperature appropriate. Not diaphoretic. 


HEAD: Atraumatic. Normocephalic.


EYES: Pupils equal and round and reactive. Extraocular motions intact. No 

scleral icterus. No injection or drainage. Fundi not examined.


ENT: Hearing grossly normal. Nose without bleeding or purulent drainage. 


NECK: Trachea midline. 


CARDIOVASCULAR: Regular rate and rhythm.  No murmurs, gallops, or rubs. No JVD.

  


RESPIRATORY/CHEST: Shallow respirations, dyspnea with minimal exertion Breath 

sounds equal bilaterally but diminished throughout; scattered rhonchi


GASTROINTESTINAL: Abdomen soft, non-tender, nondistended. No guarding. Bowel 

sounds present.


GENITOURINARY: Without palpable bladder distension. 


MUSCULOSKELETAL: No obvious deformities.  No clubbing, cyanosis or edema


NEUROLOGICAL: Awake and alert. Follows commands.  Confused.  Moves all 

extremities.


PSYCHIATRIC: Flat affect, disorganized


.





Diagnostic Tests


Laboratory





Laboratory Tests








Test


  12/29/17


11:50


 


Urine Color


  YELLOW


(YELLW/STRAW)


 


Urine Turbidity HAZY (CLEAR) 


 


Urine pH 6.0 (5.0-8.5) 


 


Urine Specific Gravity


  1.025


(1.002-1.035)


 


Urine Protein


  30 mg/dL


(NEG-TRACE)


 


Urine Glucose (UA)


  NEG mg/dL


(NEG)


 


Urine Ketones 10 mg/dL (NEG) 


 


Urine Occult Blood NEG (NEG) 


 


Urine Nitrite NEG (NEG) 


 


Urine Bilirubin NEG (NEG) 


 


Urine Urobilinogen


  LESS THAN 2.0


MG/DL (LESS


 


Urine Leukocyte Esterase MOD (NEG) 


 


Urine RBC 10 /hpf (0-3) 


 


Urine WBC 47 /hpf (0-5) 


 


Urine Mucus FEW /lpf (OCC) 


 


Urine Yeast with Hyphae FEW (NONE) 


 


Urine Yeast (Budding) OCC (NONE) 


 


Microscopic Urinalysis Comment


  CULTURE


INDICATED








Microbiology





Microbiology








 Date/Time


Source Procedure


Growth Status


 


 


 12/29/17 11:50


Urine Other Urine Culture


Pending Received











Assessment and Plan


Disease Oriented Problem List:  


(1) UTI (urinary tract infection)


(2) History of endarterectomy


(3) Peripheral vascular disease


(4) Leukocytosis


(5) Polycythemia


(6) Atrial fibrillation


Symptom Scale:  


(1) Pain


0-10 Scale:  Unable to quantify





(2) Confusion


0-10 Scale:  Unable to quantify





(3) Generalized weakness


0-10 Scale:  Unable to quantify





(4) Dyspnea


0-10 Scale:  Unable to quantify





Pertinent Non-Medical Issues


Psychosocial:  Reportedly lives with daughter in crowded, cluttered apartment. 

6 children altogether but patient can't tell me what state they are in.


Spiritual:  Yarsani. Not particularly interested in chaplaincy visits.


Legal: No known advance directives. 


Ethical issues impacting care: Patient's capacity is questionable. Recommend at 

least shared decision making until she is cognitively clearer or we see that 

goals/preferences are consistent. 


.


Important Contacts


Mariam Jameson (daughter): [phone] 


.


Prognosis


It is unclear to what extent patient's current status is due to her peripheral 

vascular disease and to what extent there are other factors. Some of her 

confusion be due to vascular dementia (she is post stroke).  Patient has been 

accepted to [facility] for rehab s/p vascular surgery. She risk for 

continued complications/setbacks.


.


Code Status:  Full Code


Plan





==  Decision making:  Patient still does not have capacity to make medical 

decisions.


==  FULL CODE 


==  AGGRESSIVE goals


==  Discussed patient's case with bedside nurse (Jacqueline) and Radha


==  Symptoms:


* Pain: Patient reporting ongoing mild pain in her right lower extremity. She 

was unable to describe pain but reported the pain was exacerbated by movement 

and touch.  Possible contributing factors include recent procedures, invasive 

lines, immobility, bedbound status, dyspnea.


* Generalized weakness:  Both weakness and right lower extremity pain seemed to 

make her bedbound in the days leading up to hospital admission.  She still has 

some residual weakness from her stroke in 2012. Physical therapy following. 

Patient presenting with weakness in the right lower extremity including foot 

drop,impaired mobility.  Plan for placement at SNF for rehabilitation upon 

discharge.


* Confusion:  CT head on 12/18/2017 showed suspected areas of encephalomalacia 

bilaterally; suspected small vessel ischemic changes throughout the white 

matter and atrophy.Patient was Meyer acted on 12/28/17 and transferred to the 

med-psych floor. She remains confused, unable to clarify the reason for her 

hospitalization. Patient had marked leukocytosis on admission and Yeast 

infection with possible UTI on 12/25/17. Afebrile. Follow up UA on 12/29/17 

suspicious for UTI and/or yeast; urine culture pending.  Patient started on 

Fluconazole and Rocephin. EKG ordered to rule out any acute changes related to 

confusion.  


* Dyspnea: Respirations shallow with diminished air exchange, coarse breath 

sounds. Oxygen saturation is in the low 90s on room air.  Patient received 

furosemide 40mg IV and methylprednisolone 125mg IV 1; DuoNebs ordered every 4 

hours PRN or wheezing.  Chest x-ray stable appearance of bilateral interstitial 

infiltrates which may represent pulmonary edema and stable small bilateral 

pleural effusions.





== Patient reportedly lives with her daughter (Mariam), but she has been 

unreachable. Accurints report was requested on 12/19/17 for daughter, Mariam Jameson. [place] [employer] did  a wellness check but they were 

unable to locate the patient's daughter. Case management has been requested to 

rerun an accurints for the patient's daughter under the name Mariam Cormier.





==  If patient becomes cognitively clear, we will try and get her to complete 

health care surrogate forms and we will re-visit resuscitation wishes.  If she 

doesn't clear, we need to identify the appropriate proxy or proxies and speak 

to them about resuscitation status and goals.





==  Palliative care will continue to follow to assist with symptom management 

and to further clarify goals of medical treatment as the clinical course 

evolves.  Discussed with patient's nurse and Dr. Schafer.


.





Attestation


To help prompt me to consider important information that might be impacting 

today's encounter and assessment, information from prior notes written by 

myself or my colleagues may have been "brought forward" into today's note.  My 

signature on this note, however, is an attestation that I personally performed 

the exam, history, and/or decision-making noted today, and, unless otherwise 

indicated, the interactions with patient, family, and staff as well as the 

review of records all occurred today.  I also attest that the listed assessment 

and stated plan reflect my best clinical judgment today based on the 

combination of historical information, prior notes, and today's exam/ 

interactions.  When time spent is documented, it refers only to time spent 

today by the signer, or if indicated, combined time spent today by 

collaborating physician/nurse practitioner.


.











Ruchi Tucker Dec 29, 2017 14:02

## 2017-12-29 NOTE — RADRPT
EXAM DATE/TIME:  12/29/2017 14:45 

 

[facility] COMPARISON:     

CHEST PA & LAT, December 25, 2017, 15:44.

 

                     

INDICATIONS :     

Difficulty breathing, cough

                     

 

MEDICAL HISTORY :            

Stroke. Hypertension   

 

SURGICAL HISTORY :        

Tonsillectomy. Tubal ligation

 

ENCOUNTER:     

Subsequent                                        

 

ACUITY:     

4 - 6 days      

 

PAIN SCORE:     

Non-responsive.

 

LOCATION:     

Bilateral chest 

 

FINDINGS:     

AP and lateral views of the chest were obtained and again demonstrate coarse interstitial infiltrates
 throughout both lungs rest of the lung bases. There are small posterior pleural effusions with blunt
ing of the costophrenic angles. The heart size remains within normal limits. Atherosclerotic changes 
are present in the aorta.

 

CONCLUSION:     

1. Stable appearance of the bilateral interstitial infiltrates which may represent pulmonary edema.

2. Stable small bilateral pleural effusions.

 

 

 

 Jimmie Mendoza MD on December 29, 2017 at 14:59           

Board Certified Radiologist.

 This report was verified electronically.

## 2017-12-29 NOTE — PD.CONS
History of Present Illness


Service


Medicine Consult


Consult Requested By


Psych


Reason for Consult


Medical Management, Leukocytosis


Primary Care Physician


No Primary Care Physician


Diagnoses:  


(1) Leukocytosis


(2) Confusion


History of Present Illness


77F who presented to the ER and admitted under Baker Act for generalized 

confusion.  She has baseline dementia, but apparently had some worsening over 

the days leading up to admission.  She recalls having an "orthopedic surgery" 

and shows me her scar from her recent Fem/Pop bypass surgery on her medial 

right thigh.  Looking back on her recent labwork, she has marked leukocytosis 

on admission and Yeast infection with possible UTI on 12/25/17.  She is 

afebrile and is on PO Augmentin without any sign of antifungal coverage.





Review of Systems


ROS Limitations:  Altered Mental Status


Respiratory:  DENIES: Cough


Cardiovascular:  DENIES: Chest pain


Gastrointestinal:  DENIES: Abdominal pain


Musculoskeletal:  DENIES: Joint pain


Psychiatric:  COMPLAINS OF: Confusion





Past Family Social History


Allergies:  


Coded Allergies:  


     No Known Allergies (Unverified  Allergy, Unknown, 12/17/17)


Past Medical History


Dementia, A Fib, Polycythemia


Past Surgical History


Recent right Fem/Pop Bypass


Social History


Unreliable due to confusion and dementia





Physical Exam


Vital Signs





Vital Signs








  Date Time  Temp Pulse Resp B/P (MAP) Pulse Ox O2 Delivery O2 Flow Rate FiO2


 


12/29/17 05:40 98.2 100 20 161/82 (108) 93   


 


12/28/17 16:45 98.2 98 17 168/79 (108) 92   








Physical Exam


GENERAL: Elderly woman who is confused, but peaceful in bed


SKIN: No rashes, ecchymoses or lesions. Cool and dry.


HEAD: Atraumatic. Normocephalic. No temporal or scalp tenderness.


EYES: Pupils equal round and reactive. Extraocular motions intact. No scleral 

icterus. No injection or drainage. 


ENT: Nose without bleeding, purulent drainage or septal hematoma. Throat 

without erythema, tonsillar hypertrophy or exudate. Uvula midline. Airway 

patent.


NECK: Trachea midline. No JVD or lymphadenopathy. Supple, nontender, no 

meningeal signs.


CARDIOVASCULAR: Irregular, rate controlled, no gallops, or rubs. 


RESPIRATORY: Clear to auscultation. Breath sounds equal bilaterally. No wheezes

, rales, or rhonchi.  


GASTROINTESTINAL: Abdomen soft, non-tender, nondistended. No hepato-splenomegaly

, or palpable masses. No guarding.


MUSCULOSKELETAL: Extremities without clubbing, cyanosis, or edema. No joint 

tenderness, effusion, or edema noted.


NEUROLOGICAL: Awake but confused. Cranial nerves II through XII intact.  Motor 

and sensory grossly within normal limits, no focal deficits.  Normal speech.





Assessment and Plan


Problem List:  


(1) Confusion


ICD Codes:  R41.0 - Disorientation, unspecified


Status:  Acute


(2) Leukocytosis


ICD Codes:  D72.829 - Elevated white blood cell count, unspecified


(3) Atrial fibrillation


ICD Codes:  I48.91 - Unspecified atrial fibrillation


(4) Polycythemia


ICD Codes:  D75.1 - Secondary polycythemia


Status:  Chronic


Assessment and Plan








Confusion / Dementia


Recent acute exacerbation, admitted under Baker Act


Will look further into medical causes, such as UTI, PNA, cardiac, etc. 


Will order PT to help her ambulate, evaluate





Leukocytosis


Chronically elevated as part of polycythemia


Evidence of UTI and/or yeast infection on last UA


Repeat UA





Urinary Tract Infection


Fungal vs. bacterial


No evidence of antifungals since positive on 12/25/17


Start Diflucan, single dose of Rocephin, repeat UA





Fem/Pop Bypass


Recent, wound appears clean, dry, intact, steri-strips in place





Atrial Fibrillation


Rate controlled


EKG ordered to rule out any acute changes related to confusion (MI, CHF, etc)





DVT Prophylaxis  


On Plavix for atrial fib, recent Fem/Pop Bypass


Physical Therapy for Eval & Treat, and ambulation











Manpreet Canales MD Dec 29, 2017 10:55

## 2017-12-30 VITALS
OXYGEN SATURATION: 93 % | TEMPERATURE: 97.3 F | RESPIRATION RATE: 16 BRPM | DIASTOLIC BLOOD PRESSURE: 71 MMHG | SYSTOLIC BLOOD PRESSURE: 143 MMHG | HEART RATE: 79 BPM

## 2017-12-30 VITALS
TEMPERATURE: 97.9 F | OXYGEN SATURATION: 96 % | DIASTOLIC BLOOD PRESSURE: 55 MMHG | HEART RATE: 79 BPM | SYSTOLIC BLOOD PRESSURE: 111 MMHG | RESPIRATION RATE: 17 BRPM

## 2017-12-30 VITALS — OXYGEN SATURATION: 93 %

## 2017-12-30 VITALS — OXYGEN SATURATION: 96 %

## 2017-12-30 LAB
BUN SERPL-MCNC: 16 MG/DL (ref 7–18)
CALCIUM SERPL-MCNC: 8.4 MG/DL (ref 8.5–10.1)
CHLORIDE SERPL-SCNC: 99 MEQ/L (ref 98–107)
CREAT SERPL-MCNC: 0.65 MG/DL (ref 0.5–1)
ERYTHROCYTE [DISTWIDTH] IN BLOOD BY AUTOMATED COUNT: 19.2 % (ref 11.6–17.2)
GFR SERPLBLD BASED ON 1.73 SQ M-ARVRAT: 89 ML/MIN (ref 89–?)
GLUCOSE SERPL-MCNC: 130 MG/DL (ref 74–106)
HCO3 BLD-SCNC: 24.9 MEQ/L (ref 21–32)
HCT VFR BLD CALC: 45.9 % (ref 35–46)
HGB BLD-MCNC: 15.1 GM/DL (ref 11.6–15.3)
MCH RBC QN AUTO: 22.8 PG (ref 27–34)
MCHC RBC AUTO-ENTMCNC: 32.9 % (ref 32–36)
MCV RBC AUTO: 69.1 FL (ref 80–100)
PLATELET # BLD: 1416 TH/MM3 (ref 150–450)
PMV BLD AUTO: 8.6 FL (ref 7–11)
RBC # BLD AUTO: 6.64 MIL/MM3 (ref 4–5.3)
SODIUM SERPL-SCNC: 137 MEQ/L (ref 136–145)
WBC # BLD AUTO: 36.6 TH/MM3 (ref 4–11)

## 2017-12-30 RX ADMIN — DIGOXIN SCH MG: 125 TABLET ORAL at 08:18

## 2017-12-30 RX ADMIN — ATORVASTATIN CALCIUM SCH MG: 80 TABLET, FILM COATED ORAL at 21:43

## 2017-12-30 RX ADMIN — FUROSEMIDE SCH MG: 10 INJECTION, SOLUTION INTRAMUSCULAR; INTRAVENOUS at 15:15

## 2017-12-30 RX ADMIN — POTASSIUM CHLORIDE SCH MEQ: 750 CAPSULE, EXTENDED RELEASE ORAL at 15:15

## 2017-12-30 RX ADMIN — FUROSEMIDE SCH MG: 10 INJECTION, SOLUTION INTRAMUSCULAR; INTRAVENOUS at 09:00

## 2017-12-30 RX ADMIN — IPRATROPIUM BROMIDE AND ALBUTEROL SULFATE PRN AMPULE: .5; 3 SOLUTION RESPIRATORY (INHALATION) at 01:04

## 2017-12-30 RX ADMIN — METOPROLOL TARTRATE SCH MG: 25 TABLET, FILM COATED ORAL at 21:43

## 2017-12-30 RX ADMIN — POTASSIUM CHLORIDE SCH MEQ: 750 CAPSULE, EXTENDED RELEASE ORAL at 09:00

## 2017-12-30 RX ADMIN — SODIUM CHLORIDE SCH MLS/HR: 900 INJECTION INTRAVENOUS at 11:00

## 2017-12-30 RX ADMIN — CLOPIDOGREL BISULFATE SCH MG: 75 TABLET, FILM COATED ORAL at 08:18

## 2017-12-30 RX ADMIN — METOPROLOL TARTRATE SCH MG: 25 TABLET, FILM COATED ORAL at 08:18

## 2017-12-30 RX ADMIN — IPRATROPIUM BROMIDE AND ALBUTEROL SULFATE SCH AMPULE: .5; 3 SOLUTION RESPIRATORY (INHALATION) at 19:33

## 2017-12-30 NOTE — PD.CAR.PN
CVT Progress Note


Subjective/Hospital Course:


This 77-year-old lady was admitted through the emergency room yesterday for


generalized weakness.  The patient is a very poor historian, barely says


anything. Apparently, she was at home for the last week or so barely doing


anything, too weak to get up or eat or drink.  The patient came to the hospital


dirty, smelling of feces and urine.  She was diagnosed with sepsis based in the


urinary tract infection. At the time of arrival she was noted to have cyanotic


discoloration of the right foot and hence, the consultation.


 


It should be noted that the patient is not following up with doctors. She 

apparently smoked about one


pack a day since age of 20 and stopped somewhere in the 70s.





Course of recent admission


As noted above patient was admitted with critical acute, superimposed on 

chronic ischemia of the right leg and in the process of workup for surgery 

underwent cardiac catheterization through her left groin.


This resulted then the thromboembolic occlusion of the left side circulation 

and patient underwent emergent reconstruction of the left external iliac, 

common femoral and superficial femoral arteries first.


This was followed few days later with a right sided reconstruction of the 

external iliac common femoral artery and femoropopliteal bypass


Incisions are clean and dry


Excellent flow to the both feet


Patient remains on Plavix by mouth but from my point can be anticoagulated if 

so desired by medicine in face of chronic A. fib


All the closures are subcuticular and no sutures are to be removed.


Nothing to add to care at this time


Patient can ambulate as tolerated can take daily showers and leave incisions 

open to air


Objective:





Vital Signs








  Date Time  Temp Pulse Resp B/P (MAP) Pulse Ox O2 Delivery O2 Flow Rate FiO2


 


12/30/17 06:00 97.3 79 16 143/71 (95) 93   


 


12/30/17 01:05     93 Nasal Cannula 2.00 


 


12/29/17 18:57  94  157/74 (101)    


 


12/29/17 17:59    172/100 (124)    


 


12/29/17 17:57 97.6  17 192/99 (130) 93   








Result Diagram:  


12/29/17 1346














Amber Acosta MD Dec 30, 2017 11:25

## 2017-12-30 NOTE — HHI.PYPN
Subjective


Chief Complaint:  patient confused unable to care for self found lying in her 

own waste produ


Remarks


Pt seen and discussed with staff. She has been pleasant and cooperative. She is 

confused but today can identify that she is at [facility]. She is 

cooperative with medication. No SI/HI





Mental Status Examination


Appearance:  Appropriate


Consciousness:  Alert


Orientation:  Person, Place ([facility])


Motor Activity:  Other (patient bedridden at this time)


Speech:  Hesitant


Language:  Adequate


Fund of Knowledge:  Inadequate


Attention and Concentration:  Other (poor)


Memory:  Impaired


Mood:  Other (euthymic to somewhat restricted)


Affect:  Other (decreased range and intensity)


Thought Process & Associations:  Loose associations


Thought Content:  Other (disorganized)


Hallucination Type:  None (denies)


Delusion Type:  None


Suicidal Ideation:  No


Suicidal Plan:  No


Suicidal Intention:  No


Homicidal Ideation:  No


Homicidal Plan:  No


Homicidal Intention:  No


Insight:  Poor


Judgment:  Poor





Results


Labs











Test


  12/30/17


15:42


 


White Blood Count 36.6 TH/MM3 


 


Red Blood Count 6.64 MIL/MM3 


 


Hemoglobin 15.1 GM/DL 


 


Hematocrit 45.9 % 


 


Mean Corpuscular Volume 69.1 FL 


 


Mean Corpuscular Hemoglobin 22.8 PG 


 


Mean Corpuscular Hemoglobin


Concent 32.9 % 


 


 


Red Cell Distribution Width 19.2 % 


 


Platelet Count 1416 TH/MM3 


 


Mean Platelet Volume 8.6 FL 


 


Blood Urea Nitrogen 16 MG/DL 


 


Creatinine 0.65 MG/DL 


 


Random Glucose 130 MG/DL 


 


Calcium Level 8.4 MG/DL 


 


Sodium Level 137 MEQ/L 


 


Potassium Level 3.5 MEQ/L 


 


Chloride Level 99 MEQ/L 


 


Carbon Dioxide Level 24.9 MEQ/L 


 


Anion Gap 13 MEQ/L 


 


Estimat Glomerular Filtration


Rate 89 ML/MIN 


 














 Date/Time


Source Procedure


Growth Status


 


 


 12/29/17 11:50


Urine Other Urine Culture - Preliminary


Candida Albicans Resulted








Vitals/IOs





Vital Signs








  Date Time  Temp Pulse Resp B/P (MAP) Pulse Ox O2 Delivery O2 Flow Rate FiO2


 


12/30/17 19:33     96 Nasal Cannula 2.00 


 


12/30/17 18:00 97.9 79 17 111/55 (73)    














Intake and Output   


 


 12/30/17 12/30/17 12/31/17





 08:00 16:00 00:00


 


Intake Total 240 ml 720 ml 120 ml


 


Balance 240 ml 720 ml 120 ml











Assessment & Plan


Problem List:  


(1) OTHER ALZHEIMER'S DISEASE


ICD Codes:  G30.8 - OTHER ALZHEIMER'S DISEASE


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 

disturbance


ICD Codes:  F02.80 - Dementia in other diseases classified elsewhere without 

behavioral disturbance


Assessment & Plan


Continue current tx plan.  Estimated LOS:  days


Justification for Cont. Inpt.


complicating medical conditions


Request HC Surrog/Guard Advoc?:  Yes











Erika Salomon MD Dec 30, 2017 19:57

## 2017-12-30 NOTE — HHI.PR
Subjective


Remarks


Follow-up visit dementia, urinary tract infection, status post femoropopliteal 

bypass.  Patient seen and examined today lying in bed.  Reports she is doing 

fine.  States that she has no shortness of breath, dyspnea.  Reports incisional 

pain on the right thigh.  Denies chest pain, palpitations, headaches, 

dizziness. Denies fevers, chills, n/v/d.  Denies  dysuria.





Objective


Vitals





Vital Signs








  Date Time  Temp Pulse Resp B/P (MAP) Pulse Ox O2 Delivery O2 Flow Rate FiO2


 


12/30/17 06:00 97.3 79 16 143/71 (95) 93   


 


12/30/17 01:05     93 Nasal Cannula 2.00 


 


12/29/17 18:57  94  157/74 (101)    


 


12/29/17 17:59    172/100 (124)    


 


12/29/17 17:57 97.6  17 192/99 (130) 93   














I/O      


 


 12/29/17 12/29/17 12/29/17 12/30/17 12/30/17 12/30/17





 07:00 15:00 23:00 07:00 15:00 23:00


 


Intake Total 240 ml 240 ml 720 ml   


 


Balance 240 ml 240 ml 720 ml   


 


      


 


Intake Oral 240 ml 240 ml 720 ml   


 


# Voids 3  2 2  


 


# Bowel Movements 2   1  








Result Diagram:  


12/29/17 1346





Imaging





Last Impressions








Chest X-Ray 12/29/17 0000 Signed





Impressions: 





 Service Date/Time:  Friday, December 29, 2017 14:45 - CONCLUSION:  1. Stable 





 appearance of the bilateral interstitial infiltrates which may represent 





 pulmonary edema. 2. Stable small bilateral pleural effusions.     Jimmie Mendoza MD 








Objective Remarks


GENERAL: This is a well-nourished, well-developed patient, in no apparent 

distress.


SKIN: Warm and dry


HEENT: Normocephalic. Pupils equal round and reactive. Nose without bleeding. 

Airway patent.


NECK: Trachea midline. No JVD. Supple.


CARDIOVASCULAR: Regular rate and rhythm without murmurs, gallops, or rubs. 


RESPIRATORY: No wheezes, rales, or rhonchi.  Diminished bases.


GASTROINTESTINAL: Abdomen soft, non-tender, nondistended. Bowel Sounds 

normoactive x4.


MUSCULOSKELETAL: Extremities without clubbing, cyanosis.  RLE trace edema


NEUROLOGICAL: Awake and alert. Oriented to  place, person. Moves all 

extremities. Normal speech.





A/P


Problem List:  


(1) History of endarterectomy


ICD Code:  Z98.890 - Other specified postprocedural states


(2) Dyspnea


ICD Code:  R06.00 - Dyspnea, unspecified


(3) Pain


ICD Code:  R52 - Pain, unspecified


(4) Thrombocytosis


ICD Code:  D47.3 - Essential (hemorrhagic) thrombocythemia


Status:  Chronic


(5) UTI (urinary tract infection)


ICD Code:  N39.0 - Urinary tract infection, site not specified


(6) Peripheral vascular disease


ICD Code:  I73.9 - Peripheral vascular disease, unspecified


Status:  Acute


Assessment and Plan


Patient is a 76 Y/O female who presented to the  hospital 12/17 with 

generalized weakness.  Patient found to have PAD status post right 

femoropopliteal bypass and femoral endarterectomy.  Her hospitalization was 

also complicated with sepsis secondary to UTI and pneumonia.  Patient continued 

to have psychosis and confusion.  She is now admitted to medical psychiatry 

unit for further evaluation.  Consulted for medical management.





Psychosis, Dementia


   - Managed by psychiatry team





Urinary tract infection


   - Evidence of UTI with Candida.


   - Continue Diflucan


   - Patient was also treated with Rocephin





Pneumonia


Leukocytosis, patient also with history of chronic polycythemia


   - Had history of sepsis pneumonia, UTI


   - Patient was started on Augmentin twice a day in inpatient 12/28/17.


   - Will continue with IV Rocephin for now.  Azithromycin by mouth.








S/P Fem/Pop Bypass


   - 12/22/17 exploration of the left groin, left common femoral and external 

iliac artery. Endarterectomy and patch angioplasty. External and common artery 

thromboembolectomy, superficial femoral popliteal artery thromboembolectomy, 

arteriogram.


   - 12/26/17 Right external iliac-common femoral endarterectomy and patch 

angioplasty and right femoral-popliteal bypass, PTFE graft


   - Recent, wound appears clean, dry, intact, steri-strips in place





Atrial Fibrillation, Rate controlled


HTN, HLD


   - EKG 


   - Abnormal EKG in inpatient with abnormal stress test and subsequent cardiac 

catheterization was done.


   - Continue with Plavix 75 mg daily, metoprolol 25 mg by mouth every 12 hours

, atorvastatin 80 mg daily at bedtime, digoxin 0.125 mg daily


   - Monitor BP trend





DVT prop and Plavix for now, early ambulation will consult physical therapy.











Aileen Rollins Dec 30, 2017 13:32

## 2017-12-31 VITALS
SYSTOLIC BLOOD PRESSURE: 125 MMHG | TEMPERATURE: 98 F | DIASTOLIC BLOOD PRESSURE: 59 MMHG | HEART RATE: 105 BPM | OXYGEN SATURATION: 92 % | RESPIRATION RATE: 16 BRPM

## 2017-12-31 VITALS
DIASTOLIC BLOOD PRESSURE: 60 MMHG | OXYGEN SATURATION: 92 % | TEMPERATURE: 98 F | RESPIRATION RATE: 16 BRPM | HEART RATE: 90 BPM | SYSTOLIC BLOOD PRESSURE: 129 MMHG

## 2017-12-31 VITALS — OXYGEN SATURATION: 94 %

## 2017-12-31 RX ADMIN — ATORVASTATIN CALCIUM SCH MG: 80 TABLET, FILM COATED ORAL at 22:03

## 2017-12-31 RX ADMIN — METOPROLOL TARTRATE SCH MG: 25 TABLET, FILM COATED ORAL at 09:20

## 2017-12-31 RX ADMIN — POTASSIUM CHLORIDE SCH MEQ: 750 CAPSULE, EXTENDED RELEASE ORAL at 09:19

## 2017-12-31 RX ADMIN — METOPROLOL TARTRATE SCH MG: 25 TABLET, FILM COATED ORAL at 22:03

## 2017-12-31 RX ADMIN — FUROSEMIDE SCH MG: 10 INJECTION, SOLUTION INTRAMUSCULAR; INTRAVENOUS at 09:21

## 2017-12-31 RX ADMIN — POTASSIUM CHLORIDE SCH MEQ: 750 CAPSULE, EXTENDED RELEASE ORAL at 09:00

## 2017-12-31 RX ADMIN — FUROSEMIDE SCH MG: 10 INJECTION, SOLUTION INTRAMUSCULAR; INTRAVENOUS at 09:00

## 2017-12-31 RX ADMIN — CLOPIDOGREL BISULFATE SCH MG: 75 TABLET, FILM COATED ORAL at 09:19

## 2017-12-31 RX ADMIN — SODIUM CHLORIDE SCH MLS/HR: 900 INJECTION INTRAVENOUS at 11:00

## 2017-12-31 RX ADMIN — IPRATROPIUM BROMIDE AND ALBUTEROL SULFATE SCH AMPULE: .5; 3 SOLUTION RESPIRATORY (INHALATION) at 07:25

## 2017-12-31 RX ADMIN — IPRATROPIUM BROMIDE AND ALBUTEROL SULFATE SCH AMPULE: .5; 3 SOLUTION RESPIRATORY (INHALATION) at 11:04

## 2017-12-31 RX ADMIN — FLUCONAZOLE SCH MG: 200 TABLET ORAL at 09:19

## 2017-12-31 RX ADMIN — DIGOXIN SCH MG: 125 TABLET ORAL at 09:20

## 2017-12-31 RX ADMIN — IPRATROPIUM BROMIDE AND ALBUTEROL SULFATE SCH AMPULE: .5; 3 SOLUTION RESPIRATORY (INHALATION) at 21:15

## 2017-12-31 NOTE — EKG
Date Performed: 12/29/2017       Time Performed: 12:26:58

 

PTAGE:      77 years

 

EKG:      Sinus rhythm 

 

 WITH FREQUENT SUPRAVENTRICULAR PREMATURE COMPLEXES BORDERLINE RIGHT AXIS DEVIATION NONSPECIFIC ST & 
T-WAVE ABNORMALITY Compared to previous tracing, patient is no longer in sinus tachycardia. ST-T wave
 abnormalities improved ABNORMAL RHYTHM ECG

 

PREVIOUS TRACING       : 12/24/2017 15.09

 

DOCTOR:   Dennis Harrison  Interpretating Date/Time  12/31/2017 13:56:01

## 2017-12-31 NOTE — HHI.PR
Subjective


Remarks


Patient is lying in bed.


No complaints overnight.  Reports she has not walked much because she had 

recent surgery pointing to her right lower extremity.


Chart review discussed with her nurse.  Currently patient does not have any 

family members that the team was able to reach out to.  patient reports that 

she has no medical issues prior to hospitalization here.  She denies having any 

primary care doctor.


when Discussed about her medical conditions including essential thrombocytosis, 

atrial fibrillation, she is not aware of it at all.


Her nurse reported that she is at high risk for falls.  She fell while in 

hospital on December 29, 2017.





Objective


Vitals





Vital Signs








  Date Time  Temp Pulse Resp B/P (MAP) Pulse Ox O2 Delivery O2 Flow Rate FiO2


 


12/31/17 07:27     94   21


 


12/31/17 06:00 98.0 90 16 129/60 (83) 92   


 


12/30/17 20:00      Nasal Cannula 2.00 


 


12/30/17 19:33     96 Nasal Cannula 2.00 


 


12/30/17 18:00 97.9 79 17 111/55 (73) 96   


 


12/30/17 16:33      Nasal Cannula 2.00 


 


12/30/17 15:19      Nasal Cannula 2.00 














I/O      


 


 12/30/17 12/30/17 12/30/17 12/31/17 12/31/17 12/31/17





 07:00 15:00 23:00 07:00 15:00 23:00


 


Intake Total  960 ml 360 ml 0 ml 120 ml 


 


Balance  960 ml 360 ml 0 ml 120 ml 


 


      


 


Intake Oral  960 ml 360 ml 0 ml 120 ml 


 


# Voids 2 3 1 2  


 


# Bowel Movements 1     








Result Diagram:  


12/30/17 1542                                                                  

              12/30/17 1542





Objective Remarks


GENERAL: This is an elderly lady, looks quite angry when talking about family 

support.  Flat affect otherwise


SKIN: Warm and dry.  Small superficial ecchymoses.  Right lower extremity 

distal femoral area with surgical sutures


HEENT: Normocephalic. Pupils equal round and reactive. Nose without bleeding. 

Airway patent.


NECK: Trachea midline. No JVD. Supple.


CARDIOVASCULAR: Regular rate and rhythm without murmurs, gallops, or rubs. 


RESPIRATORY: No wheezes, rales, or rhonchi.  Diminished bases.  Poor 

inspiratory effort


GASTROINTESTINAL: Abdomen soft, non-tender, nondistended. Bowel Sounds 

normoactive x4.


MUSCULOSKELETAL: Extremities without clubbing, cyanosis.  RLE trace edema


NEUROLOGICAL: Awake and alert. Oriented to  place, person. Moves all 

extremities. Normal speech.





A/P


Problem List:  


(1) History of endarterectomy


ICD Code:  Z98.890 - Other specified postprocedural states


(2) Dyspnea


ICD Code:  R06.00 - Dyspnea, unspecified


(3) Pain


ICD Code:  R52 - Pain, unspecified


(4) Thrombocytosis


ICD Code:  D47.3 - Essential (hemorrhagic) thrombocythemia


Status:  Chronic


(5) UTI (urinary tract infection)


ICD Code:  N39.0 - Urinary tract infection, site not specified


(6) Peripheral vascular disease


ICD Code:  I73.9 - Peripheral vascular disease, unspecified


Status:  Acute


Assessment and Plan


77 years old female who was admitted to medical service from December 17, 2017 

to December 28, 2017.


She was found to have severe PAD requiring right femoropopliteal bypass and 

femoral endarterectomy during that hospitalization.


Course was complicated by UTI and pneumonia.


Patient reports no primary care doctor in no follow-up with no medications 

prior to this hospitalization.  Nursing staff reported patient has no family 

members that they were able to reach out to as well.





Impression/plan:








Dementia.  His mental per psychiatry.


UTI.  Candida growing in cultures.  On Diflucan.  Was previously on Rocephin.


Pneumonia.  Resolved.  Asymptomatic at present.  Continue current antibiotics 

regimen with Rocephin.





Leukocytosis/thrombocytosis/erythrocytosis


Patient denies any prior history of this.  Previous admission in early December 

reveals was erythrocytosis in a smoker.


Patient has history of CVA, severe PAD, A. fib.  High risk for clot formations.








S/P Fem/Pop Bypass


   - 12/22/17 exploration of the left groin, left common femoral and external 

iliac artery. Endarterectomy and patch angioplasty. External and common artery 

thromboembolectomy, superficial femoral popliteal artery thromboembolectomy, 

arteriogram.


   - 12/26/17 Right external iliac-common femoral endarterectomy and patch 

angioplasty and right femoral-popliteal bypass, PTFE graft





Atrial Fibrillation.  Rate controlled.  Anticoagulation needs to be discussed.  

However per nursing staff, patient is a very high risk for falls and she did in 

fact fell while in hospital on December 29, 2017.  Currently patient is on 

Plavix for severe PAD.


Based on her discharge planning and risk of falls, we will reconsider 

anticoagulation.


At this point, anticoagulation with further stronger anticoagulants is 

contraindicated due to high risk of falls.


Was seen by cardiology during this hospitalization.  Due to abnormal EKG prior 

to the OR by vascular surgeon for femoropopliteal, cardiology clearance was 

obtained.








HTN


Hyperlipidemia


   - Abnormal EKG in inpatient with abnormal stress test and subsequent cardia 

cath was done?


   - Continue with Plavix 75 mg daily, metoprolol 25 mg by mouth every 12 hours

, atorvastatin 80 mg daily at bedtime, digoxin 0.125 mg daily








DVT prop and Plavix for now, early ambulation will consult physical therapy. SCD




















ecoli uti


fem pop


Discharge Planning


per case management and psychiatry team.











Edson Bhat MD Dec 31, 2017 13:39

## 2017-12-31 NOTE — HHI.PYPN
Subjective


Chief Complaint:  patient confused unable to care for self found lying in her 

own waste produ


Remarks


Pt seen and discussed with staff. She has not been agitated today. She is more 

oriented today and more lucid. She reports good mood. No SI/HI She is compliant 

with care.





Mental Status Examination


Appearance:  Appropriate


Consciousness:  Alert


Orientation:  Person, Place, Situation (knows why she is in the hospital)


Motor Activity:  Other (patient bedridden at this time)


Speech:  Hesitant


Language:  Adequate


Fund of Knowledge:  Inadequate


Attention and Concentration:  Other (poor)


Memory:  Impaired


Mood:  Other (euthymic to somewhat restricted)


Affect:  Other (decreased range and intensity)


Thought Process & Associations:  Linear


Thought Content:  Other (disorganized)


Hallucination Type:  None (denies)


Delusion Type:  None


Suicidal Ideation:  No


Suicidal Plan:  No


Suicidal Intention:  No


Homicidal Ideation:  No


Homicidal Plan:  No


Homicidal Intention:  No


Insight:  Poor


Judgment:  Poor





Results


Labs











Test


  12/30/17


15:42


 


White Blood Count 36.6 TH/MM3 


 


Red Blood Count 6.64 MIL/MM3 


 


Hemoglobin 15.1 GM/DL 


 


Hematocrit 45.9 % 


 


Mean Corpuscular Volume 69.1 FL 


 


Mean Corpuscular Hemoglobin 22.8 PG 


 


Mean Corpuscular Hemoglobin


Concent 32.9 % 


 


 


Red Cell Distribution Width 19.2 % 


 


Platelet Count 1416 TH/MM3 


 


Mean Platelet Volume 8.6 FL 


 


Blood Urea Nitrogen 16 MG/DL 


 


Creatinine 0.65 MG/DL 


 


Random Glucose 130 MG/DL 


 


Calcium Level 8.4 MG/DL 


 


Sodium Level 137 MEQ/L 


 


Potassium Level 3.5 MEQ/L 


 


Chloride Level 99 MEQ/L 


 


Carbon Dioxide Level 24.9 MEQ/L 


 


Anion Gap 13 MEQ/L 


 


Estimat Glomerular Filtration


Rate 89 ML/MIN 


 














 Date/Time


Source Procedure


Growth Status


 


 


 12/29/17 11:50


Urine Other Urine Culture - Final


Candida Albicans Complete








Vitals/IOs





Vital Signs








  Date Time  Temp Pulse Resp B/P (MAP) Pulse Ox O2 Delivery O2 Flow Rate FiO2


 


12/31/17 07:27     94   21


 


12/31/17 06:00 98.0 90 16 129/60 (83)    


 


12/30/17 20:00      Nasal Cannula 2.00 














Intake and Output   


 


 12/31/17 12/31/17 1/1/18





 08:00 16:00 00:00


 


Intake Total 0 ml 120 ml 


 


Balance 0 ml 120 ml 











Assessment & Plan


Problem List:  


(1) OTHER ALZHEIMER'S DISEASE


ICD Codes:  G30.8 - OTHER ALZHEIMER'S DISEASE


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 

disturbance


ICD Codes:  F02.80 - Dementia in other diseases classified elsewhere without 

behavioral disturbance


Assessment & Plan


Continue current tx plan. Estimated LOS:  days


Justification for Cont. Inpt.


complicating medical conditions


Request HC Surrog/Guard Advoc?:  Yes











Erika Salomon MD Dec 31, 2017 12:33

## 2018-01-01 VITALS
DIASTOLIC BLOOD PRESSURE: 98 MMHG | HEART RATE: 79 BPM | TEMPERATURE: 97.5 F | SYSTOLIC BLOOD PRESSURE: 148 MMHG | RESPIRATION RATE: 17 BRPM | OXYGEN SATURATION: 96 %

## 2018-01-01 VITALS
SYSTOLIC BLOOD PRESSURE: 148 MMHG | OXYGEN SATURATION: 95 % | RESPIRATION RATE: 16 BRPM | TEMPERATURE: 97.3 F | DIASTOLIC BLOOD PRESSURE: 62 MMHG | HEART RATE: 82 BPM

## 2018-01-01 LAB
% SATURATION IRON PROFILE: 17.6 % (ref 20–50)
BUN SERPL-MCNC: 10 MG/DL (ref 7–18)
CALCIUM SERPL-MCNC: 8.4 MG/DL (ref 8.5–10.1)
CHLORIDE SERPL-SCNC: 100 MEQ/L (ref 98–107)
CREAT SERPL-MCNC: 0.52 MG/DL (ref 0.5–1)
ERYTHROCYTE [DISTWIDTH] IN BLOOD BY AUTOMATED COUNT: 19.5 % (ref 11.6–17.2)
FERRITIN SERPL-MCNC: 90 NG/ML (ref 8–252)
FOLATE SERPL-MCNC: 3.8 NG/ML (ref 3.1–17.5)
GFR SERPLBLD BASED ON 1.73 SQ M-ARVRAT: 115 ML/MIN (ref 89–?)
GLUCOSE SERPL-MCNC: 109 MG/DL (ref 74–106)
HCO3 BLD-SCNC: 27.8 MEQ/L (ref 21–32)
HCT VFR BLD CALC: 46.3 % (ref 35–46)
HGB BLD-MCNC: 15.2 GM/DL (ref 11.6–15.3)
IRON (FE): 34 MCG/DL (ref 50–170)
MCH RBC QN AUTO: 22.7 PG (ref 27–34)
MCHC RBC AUTO-ENTMCNC: 32.8 % (ref 32–36)
MCV RBC AUTO: 69.4 FL (ref 80–100)
PLATELET # BLD: 1291 TH/MM3 (ref 150–450)
PMV BLD AUTO: 8.4 FL (ref 7–11)
RBC # BLD AUTO: 6.67 MIL/MM3 (ref 4–5.3)
SODIUM SERPL-SCNC: 136 MEQ/L (ref 136–145)
TIBC SERPL-MCNC: 193 MCG/DL (ref 250–450)
VIT B12 SERPL-MCNC: 1242 PG/ML (ref 193–986)
WBC # BLD AUTO: 27.6 TH/MM3 (ref 4–11)

## 2018-01-01 RX ADMIN — DIGOXIN SCH MG: 125 TABLET ORAL at 09:07

## 2018-01-01 RX ADMIN — CLOPIDOGREL BISULFATE SCH MG: 75 TABLET, FILM COATED ORAL at 09:07

## 2018-01-01 RX ADMIN — FUROSEMIDE SCH MG: 10 INJECTION, SOLUTION INTRAMUSCULAR; INTRAVENOUS at 09:08

## 2018-01-01 RX ADMIN — IPRATROPIUM BROMIDE AND ALBUTEROL SULFATE SCH AMPULE: .5; 3 SOLUTION RESPIRATORY (INHALATION) at 08:00

## 2018-01-01 RX ADMIN — SODIUM CHLORIDE SCH MLS/HR: 900 INJECTION INTRAVENOUS at 11:00

## 2018-01-01 RX ADMIN — POTASSIUM CHLORIDE SCH MEQ: 750 CAPSULE, EXTENDED RELEASE ORAL at 09:00

## 2018-01-01 RX ADMIN — METOPROLOL TARTRATE SCH MG: 25 TABLET, FILM COATED ORAL at 09:07

## 2018-01-01 RX ADMIN — IPRATROPIUM BROMIDE AND ALBUTEROL SULFATE SCH AMPULE: .5; 3 SOLUTION RESPIRATORY (INHALATION) at 19:07

## 2018-01-01 RX ADMIN — METOPROLOL TARTRATE SCH MG: 25 TABLET, FILM COATED ORAL at 21:35

## 2018-01-01 RX ADMIN — FLUCONAZOLE SCH MG: 200 TABLET ORAL at 09:07

## 2018-01-01 RX ADMIN — ATORVASTATIN CALCIUM SCH MG: 80 TABLET, FILM COATED ORAL at 21:35

## 2018-01-01 RX ADMIN — IPRATROPIUM BROMIDE AND ALBUTEROL SULFATE SCH AMPULE: .5; 3 SOLUTION RESPIRATORY (INHALATION) at 13:59

## 2018-01-01 NOTE — HHI.PYPN
Subjective


Chief Complaint:  patient confused unable to care for self found lying in her 

own waste produ


Remarks


Patient was seen today for psychiatric reevaluation.  Patient is agreeable, 

poorly cooperative, loud, yelling at me, yelling me to leave her alone.  She 

says that she wants to go back home today.  Whenever, patient seems to be 

confused, disoriented, she doesn't remember the reason she is in the hospital.  

As per week and report, the patient has been quite oppositional and resistant, 

but compliant with her medications,no significant side effects.





Review of Systems


Except as stated in HPI:  all other systems reviewed are Neg





Mental Status Examination


Appearance:  Appropriate


Consciousness:  Alert


Orientation:  Person, Place, Situation (knows why she is in the hospital)


Motor Activity:  Other (patient bedridden at this time)


Speech:  Hesitant


Language:  Adequate


Fund of Knowledge:  Inadequate


Attention and Concentration:  Other (poor)


Memory:  Impaired


Mood:  Other (euthymic to somewhat restricted)


Affect:  Other (decreased range and intensity)


Thought Process & Associations:  Linear


Thought Content:  Other (disorganized)


Hallucination Type:  None (denies)


Delusion Type:  None


Suicidal Ideation:  No


Suicidal Plan:  No


Suicidal Intention:  No


Homicidal Ideation:  No


Homicidal Plan:  No


Homicidal Intention:  No


Insight:  Poor


Judgment:  Poor





Results


Labs











Test


  1/1/18


05:26


 


White Blood Count 27.6 TH/MM3 


 


Red Blood Count 6.67 MIL/MM3 


 


Hemoglobin 15.2 GM/DL 


 


Hematocrit 46.3 % 


 


Mean Corpuscular Volume 69.4 FL 


 


Mean Corpuscular Hemoglobin 22.7 PG 


 


Mean Corpuscular Hemoglobin


Concent 32.8 % 


 


 


Red Cell Distribution Width 19.5 % 


 


Platelet Count 1291 TH/MM3 


 


Mean Platelet Volume 8.4 FL 


 


Blood Urea Nitrogen 10 MG/DL 


 


Creatinine 0.52 MG/DL 


 


Random Glucose 109 MG/DL 


 


Calcium Level 8.4 MG/DL 


 


Sodium Level 136 MEQ/L 


 


Potassium Level 3.3 MEQ/L 


 


Chloride Level 100 MEQ/L 


 


Carbon Dioxide Level 27.8 MEQ/L 


 


Anion Gap 8 MEQ/L 


 


Estimat Glomerular Filtration


Rate 115 ML/MIN 


 














 Date/Time


Source Procedure


Growth Status


 


 


 12/29/17 11:50


Urine Other Urine Culture - Final


Candida Albicans Complete








Vitals/IOs





Vital Signs








  Date Time  Temp Pulse Resp B/P (MAP) Pulse Ox O2 Delivery O2 Flow Rate FiO2


 


1/1/18 10:18      Nasal Cannula 2.00 21


 


1/1/18 06:25 97.5 79 17 148/98 (115) 96   














Intake and Output   


 


 1/1/18 1/1/18 1/2/18





 08:00 16:00 00:00


 


Intake Total 0 ml 240 ml 


 


Output Total 400 ml  


 


Balance -400 ml 240 ml 











Assessment & Plan


Problem List:  


(1) OTHER ALZHEIMER'S DISEASE


ICD Codes:  G30.8 - OTHER ALZHEIMER'S DISEASE


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 

disturbance


ICD Codes:  F02.80 - Dementia in other diseases classified elsewhere without 

behavioral disturbance


Assessment & Plan:  We'll increase Seroquel to 25 mg twice a day to help with 

irritability and behavioral dysregulation.





Assessment & Plan


Estimated LOS:  days


Justification for Cont. Inpt.


Patient continues to be episodically agitated, very irritable, verbally hostile

, she needs to continue psychiatric hospitalization for stabilization.


Request HC Surrog/Guard Advoc?:  Yes











Stoney Tee MD Jan 1, 2018 10:26

## 2018-01-01 NOTE — HHI.PR
Subjective


Remarks


no complaints 


has not gotten up from bed much 


looks depressed 


but also stated she has this catheter for urine- which was why she did not get 

up


has external cath in labia for urine collection 


afebrile





Objective


Vitals





Vital Signs








  Date Time  Temp Pulse Resp B/P (MAP) Pulse Ox O2 Delivery O2 Flow Rate FiO2


 


1/1/18 10:18      Nasal Cannula 2.00 21


 


1/1/18 06:25 97.5 79 17 148/98 (115) 96   


 


12/31/17 20:00      Nasal Cannula 2.00 


 


12/31/17 18:24 98.0 105 16 125/59 (81) 92   














I/O      


 


 12/31/17 12/31/17 12/31/17 1/1/18 1/1/18 1/1/18





 07:00 15:00 23:00 07:00 15:00 23:00


 


Intake Total 0 ml 120 ml 480 ml 0 ml 240 ml 


 


Output Total   800 ml 400 ml  


 


Balance 0 ml 120 ml -320 ml -400 ml 240 ml 


 


      


 


Intake Oral 0 ml 120 ml 480 ml 0 ml 240 ml 


 


Output Urine Total   800 ml 400 ml  


 


# Voids 2     








Result Diagram:  


1/1/18 0526                                                                    

            1/1/18 0526





Objective Remarks


GENERAL: This is an elderly lady, looks quite angry when talking about family 

support.  Flat affect otherwise


SKIN: Warm and dry.  Small superficial ecchymoses.  Right lower extremity 

distal femoral area with surgical sutures


HEENT: Normocephalic. Pupils equal round and reactive. Nose without bleeding. 

Airway patent.


NECK: Trachea midline. No JVD. Supple.


CARDIOVASCULAR: Regular rate and rhythm without murmurs, gallops, or rubs. 


RESPIRATORY: No wheezes, rales, or rhonchi.  Diminished bases.  Poor 

inspiratory effort


GASTROINTESTINAL: Abdomen soft, non-tender, nondistended.  significant 

suprapubic distension and pain on exam. Has external cath by labia area to 

catch urine and drained about 300cc in canister


MUSCULOSKELETAL: Extremities without clubbing, cyanosis.  RLE trace edema


NEUROLOGICAL: Awake and alert. Oriented to  place, person. Moves all 

extremities. Normal speech.





A/P


Problem List:  


(1) History of endarterectomy


ICD Code:  Z98.890 - Other specified postprocedural states


(2) Dyspnea


ICD Code:  R06.00 - Dyspnea, unspecified


(3) Pain


ICD Code:  R52 - Pain, unspecified


(4) Thrombocytosis


ICD Code:  D47.3 - Essential (hemorrhagic) thrombocythemia


Status:  Chronic


(5) UTI (urinary tract infection)


ICD Code:  N39.0 - Urinary tract infection, site not specified


(6) Peripheral vascular disease


ICD Code:  I73.9 - Peripheral vascular disease, unspecified


Status:  Acute


Assessment and Plan


77 years old female who was admitted to medical service from December 17, 2017 

to December 28, 2017.


She was found to have severe PAD requiring right femoropopliteal bypass and 

femoral endarterectomy during that hospitalization.


Course was complicated by UTI and pneumonia.


Patient reports no primary care doctor in no follow-up with no medications 

prior to this hospitalization.  Nursing staff reported patient has no family 

members that they were able to reach out to as well.





Impression/plan:








Suprapubic tenderness on exam today


has external cath for urine collection 


will do bladder scan to r/o retention


if retention, then to insert munoz








Dementia.  His mental per psychiatry.


UTI.  Candida growing in cultures.  On Diflucan.  Was previously on Rocephin.  

On pick wick cath as external cath for urine collection


Pneumonia.  Resolved.  Asymptomatic at present.  Continue current antibiotics 

regimen with Rocephin.





Leukocytosis/thrombocytosis/erythrocytosis


Patient denies any prior history of this.  Previous admission in early December 

reveals was erythrocytosis in a smoker.


Patient has history of CVA, severe PAD, A. fib.  High risk for clot formations.








S/P Fem/Pop Bypass


   - 12/22/17 exploration of the left groin, left common femoral and external 

iliac artery. Endarterectomy and patch angioplasty. External and common artery 

thromboembolectomy, superficial femoral popliteal artery thromboembolectomy, 

arteriogram.


   - 12/26/17 Right external iliac-common femoral endarterectomy and patch 

angioplasty and right femoral-popliteal bypass, PTFE graft





Atrial Fibrillation.  Rate controlled.  Anticoagulation needs to be discussed.  

However per nursing staff, patient is a very high risk for falls and she did in 

fact fell while in hospital on December 29, 2017.  Currently patient is on 

Plavix for severe PAD.


Based on her discharge planning and risk of falls, we will reconsider 

anticoagulation.


At this point, anticoagulation with further stronger anticoagulants is 

contraindicated due to high risk of falls.


Was seen by cardiology during this hospitalization.  Due to abnormal EKG prior 

to the OR by vascular surgeon for femoropopliteal, cardiology clearance was 

obtained.








HTN


Hyperlipidemia


   - Abnormal EKG in inpatient with abnormal stress test and subsequent cardia 

cath was done?


   - Continue with Plavix 75 mg daily, metoprolol 25 mg by mouth every 12 hours

, atorvastatin 80 mg daily at bedtime, digoxin 0.125 mg daily








DVT prop and Plavix for now, early ambulation will consult physical therapy. SCD


Discharge Planning


per case management and psychiatry team.











Edson Bhat MD Jan 1, 2018 12:16

## 2018-01-01 NOTE — RADRPT
EXAM DATE/TIME:  01/01/2018 03:17 

 

[facility] COMPARISON:     

CHEST PA & LAT, December 29, 2017, 14:45.  CHEST SINGLE AP, December 24, 2017, 15:11.

 

                     

INDICATIONS :     

Shortness of breath, possible pulmonary disease.

                     

 

MEDICAL HISTORY :     

Stroke.  Hypertension        

 

SURGICAL HISTORY :     

Tonsillectomy. Tubal ligation.  

 

ENCOUNTER:     

Subsequent                                        

 

ACUITY:     

1 week      

 

PAIN SCORE:     

Non-responsive.

 

LOCATION:     

Bilateral chest 

 

FINDINGS:     

A single portable frontal view of the chest shows persistent chronic interstitial changes. Consolidat
ion within the right lung base is new. No effusions. Heart normal size. Aorta is calcified. Bony stru
ctures are unremarkable.

 

CONCLUSION:     

New right lower lobe infiltrate. Stable interstitial changes.

 

 

 

 Mike Dixon Jr., MD on January 01, 2018 at 4:58           

Board Certified Radiologist.

 This report was verified electronically.

## 2018-01-01 NOTE — HHI.HCPN
Reason for visit





   a.  To assist with evaluation and management of symptoms including:  pain; 

confusion; generalized weakness


   b.  To assist medical decision maker(s) with: better understanding of 

current medical conditions; weighing benefits/burdens of medical treatment 

options; making medical treatment decisions.


.





Subjective/Interval History


.


Palliative care is familiar with this patient who was admitted to medical 

services from 12/17/2017 through 12/28/2017. The patient has severe PAD 

necessitating a right femoral-popliteal bypass and femoral endarterectomy. Her 

hospital course has been complicated by UTI and pneumonia. Psychiatry was 

following this patient who was Meyer acted the patient on 12/28/2017; she was 

then transferred to the [facility] floor.





Of note, patient reportedly lives with her daughter but she does not know where 

her daughter (Mariam) is and the daughter has not been located during this 

hospitalization. An accurints report was done for daughter (Mariam Jameson) 

on          12/19/17 and [employer] did a wellness check at the home address but 

neither were productive. Case management had been requested to rerun an 

accurints for the patient's daughter under the name of Mariam Cormier before the 

patient was transferred to [facility]; it is unclear if  any information was 

obtained.





Patient is pleasant but does not want to converse, hesitant toward physical 

examination. Patient remains intermittently confused at which time she does not 

remember why she is hospitalized. Per psychiatry notes, patient continues to be 

episodically agitated, very irritable, verbally hostile and requires continued 

psychiatric hospitalization for stabilization.  Plan to increase Seroquel to 25 

mg PO 2 times daily. 





Urine culture 12/29/2017 with Candida. Chest x-ray this morning showing new 

right lower lobe infiltrate, stable interstitial changes. On Rocephin and 

DiflucanWBC: 27.6, hemoglobin 15.2, hematocrit 46.3, platelets 1291.  

Hematology consulted for evaluation of leukocytosis and thrombocytosis.





Patient presenting with weakness in the right lower extremity including foot 

drop. She is currently maximum assist for all transfers and ADLs. Plan for 

placement at SNF for rehabilitation upon discharge.


.





Objective





Vital Signs








  Date Time  Temp Pulse Resp B/P (MAP) Pulse Ox O2 Delivery O2 Flow Rate FiO2


 


1/1/18 10:18      Nasal Cannula 2.00 21


 


1/1/18 06:25 97.5 79 17 148/98 (115) 96   


 


12/31/17 20:00      Nasal Cannula 2.00 


 


12/31/17 18:24 98.0 105 16 125/59 (81) 92   














Intake & Output  


 


 1/1/18 1/1/18





 07:00 19:00


 


Intake Total 360 ml 580 ml


 


Output Total 1200 ml 750 ml


 


Balance -840 ml -170 ml


 


  


 


Intake Oral 360 ml 480 ml


 


IV Total  100 ml


 


Output Urine Total 1200 ml 750 ml


 


Bladder Scan Volume Amount  999 ml


 


# Voids  1





.


Physical Exam


CONSTITUTIONAL/GENERAL: This is a thin, elderly female who is confused but in 

no apparent distress


TUBES/LINES/DRAINS: PIV, Ext catheter.


SKIN: No jaundice, rashes, or lesions. Ecchymosis to bilateral upper 

extremities. Skin temperature appropriate. Not diaphoretic. 


HEAD: Atraumatic. Normocephalic.


EYES: Pupils equal and round and reactive. Extraocular motions intact. No 

scleral icterus. No injection or drainage. Fundi not examined.


ENT: Hearing grossly normal. Nose without bleeding or purulent drainage. 


NECK: Trachea midline. 


CARDIOVASCULAR: Regular rate and rhythm.  No murmurs, gallops, or rubs. No JVD.

  


RESPIRATORY/CHEST: Oxygenation stable on 2L vis nc. Breath sounds diminished 

bilaterally.


GASTROINTESTINAL: Abdomen soft, non-tender, nondistended. No guarding. Bowel 

sounds present.


GENITOURINARY: Without palpable bladder distension. 


MUSCULOSKELETAL: No obvious deformities.  No clubbing, cyanosis or edema


NEUROLOGICAL: Awake. Confused, intermittently agitated. Moves all extremities.


PSYCHIATRIC: Flat affect, disorganized


.





Diagnostic Tests


Laboratory





Laboratory Tests








Test


  12/30/17


15:42 1/1/18


05:26


 


White Blood Count


  36.6 TH/MM3


(4.0-11.0) 27.6 TH/MM3


(4.0-11.0)


 


Red Blood Count


  6.64 MIL/MM3


(4.00-5.30) 6.67 MIL/MM3


(4.00-5.30)


 


Hemoglobin


  15.1 GM/DL


(11.6-15.3) 15.2 GM/DL


(11.6-15.3)


 


Hematocrit


  45.9 %


(35.0-46.0) 46.3 %


(35.0-46.0)


 


Mean Corpuscular Volume


  69.1 FL


(80.0-100.0) 69.4 FL


(80.0-100.0)


 


Mean Corpuscular Hemoglobin


  22.8 PG


(27.0-34.0) 22.7 PG


(27.0-34.0)


 


Mean Corpuscular Hemoglobin


Concent 32.9 %


(32.0-36.0) 32.8 %


(32.0-36.0)


 


Red Cell Distribution Width


  19.2 %


(11.6-17.2) 19.5 %


(11.6-17.2)


 


Platelet Count


  1416 TH/MM3


(150-450) 1291 TH/MM3


(150-450)


 


Mean Platelet Volume


  8.6 FL


(7.0-11.0) 8.4 FL


(7.0-11.0)


 


Blood Urea Nitrogen


  16 MG/DL


(7-18) 10 MG/DL


(7-18)


 


Creatinine


  0.65 MG/DL


(0.50-1.00) 0.52 MG/DL


(0.50-1.00)


 


Random Glucose


  130 MG/DL


() 109 MG/DL


()


 


Calcium Level


  8.4 MG/DL


(8.5-10.1) 8.4 MG/DL


(8.5-10.1)


 


Sodium Level


  137 MEQ/L


(136-145) 136 MEQ/L


(136-145)


 


Potassium Level


  3.5 MEQ/L


(3.5-5.1) 3.3 MEQ/L


(3.5-5.1)


 


Chloride Level


  99 MEQ/L


() 100 MEQ/L


()


 


Carbon Dioxide Level


  24.9 MEQ/L


(21.0-32.0) 27.8 MEQ/L


(21.0-32.0)


 


Anion Gap


  13 MEQ/L


(5-15) 8 MEQ/L (5-15) 


 


 


Estimat Glomerular Filtration


Rate 89 ML/MIN


(>89) 115 ML/MIN


(>89)


 


Blood Smear Pathologist Review   


 


Iron Level


  


  34 MCG/DL


()


 


Total Iron Binding Capacity


  


  193 MCG/DL


(250-450)


 


Percent Iron Saturation  17.6 % (20-50) 


 


Ferritin


  


  90 NG/ML


(8-252)


 


Vitamin B12 Level


  


  1242 PG/ML


(193-986)


 


Folate


  


  3.8 NG/ML


(3.1-17.5)








Result Diagram:  


1/1/18 0526                                                                    

            1/1/18 0526








Assessment and Plan


Disease Oriented Problem List:  


(1) UTI (urinary tract infection)


(2) History of endarterectomy


(3) Peripheral vascular disease


(4) Leukocytosis


(5) Polycythemia


(6) Atrial fibrillation


Symptom Scale:  


(1) Pain


0-10 Scale:  Unable to quantify





(2) Confusion


0-10 Scale:  Unable to quantify





(3) Generalized weakness


0-10 Scale:  Unable to quantify





Pertinent Non-Medical Issues


Psychosocial:  Reportedly lives with daughter in crowded, cluttered apartment. 

6 children altogether but patient can't tell me what state they are in.


Spiritual:  Anabaptist. Not particularly interested in chaplaincy visits.


Legal: No known advance directives. 


Ethical issues impacting care: Patient's capacity is questionable. Recommend at 

least shared decision making until she is cognitively clearer or we see that 

goals/preferences are consistent. 


.


Important Contacts


Mariam Jameson (daughter): [phone] 


.


Prognosis


It is unclear to what extent patient's current status is due to her peripheral 

vascular disease and to what extent there are other factors. Some of her 

confusion be due to vascular dementia (she is post stroke).  Patient has been 

accepted to [facility] for rehab s/p vascular surgery. She risk for 

continued complications/setbacks.


.


Code Status:  Full Code


Plan





==  Decision making:  Patient still does not have capacity to make medical 

decisions.


==  FULL CODE 


==  AGGRESSIVE goals


==  Discussed patient's case with bedside nurse (Jacqueline) and Radha


==  Symptoms:


* Pain: Patient reporting ongoing mild pain in her right lower extremity. She 

was unable to describe pain but reported the pain was exacerbated by movement 

and touch.  Possible contributing factors include recent procedures, invasive 

lines, immobility, bedbound status, dyspnea.


* Generalized weakness:  Both weakness and right lower extremity pain seemed to 

make her bedbound in the days leading up to hospital admission.  She still has 

some residual weakness from her stroke in 2012. Physical therapy following. 

Patient presenting with weakness in the right lower extremity including foot 

drop,impaired mobility.  Plan for placement at SNF for rehabilitation upon 

discharge.


* Confusion:  CT head on 12/18/2017 showed suspected areas of encephalomalacia 

bilaterally; suspected small vessel ischemic changes throughout the white 

matter and atrophy.Patient was Meyer acted on 12/28/17 and transferred to the 

[facility] floor. She remains confused, unable to clarify the reason for her 

hospitalization. Patient had marked leukocytosis on admission and Yeast 

infection with possible UTI on 12/25/17. Afebrile. Follow up urine culture on 12 /29/17 Candida. Patient started on Fluconazole and Rocephin. 


== Patient reportedly lives with her daughter but she does not know where her 

daughter (Mariam) is and the daughter has not been located during this 

hospitalization. An accurints report was done for daughter (Mariam Jameson) 

on          12/19/17 and [employer] did a wellness check at the home address but 

neither were productive. Case management had been requested to rerun an 

accurints for the patient's daughter under the name of Mariam Cormier before the 

patient was transferred to [facility]; it is unclear if  any information was 

obtained.





==  If patient becomes cognitively clear, we will try and get her to complete 

health care surrogate forms and we will re-visit resuscitation wishes.  If she 

doesn't clear, we need to identify the appropriate proxy or proxies and speak 

to them about resuscitation status and goals.





==  Palliative care will continue to follow to assist with symptom management 

and to further clarify goals of medical treatment as the clinical course 

evolves. 


.











Ruchi Tucker Jan 1, 2018 15:21

## 2018-01-02 VITALS
TEMPERATURE: 97.6 F | SYSTOLIC BLOOD PRESSURE: 101 MMHG | HEART RATE: 74 BPM | DIASTOLIC BLOOD PRESSURE: 59 MMHG | RESPIRATION RATE: 18 BRPM | OXYGEN SATURATION: 95 %

## 2018-01-02 VITALS
DIASTOLIC BLOOD PRESSURE: 68 MMHG | HEART RATE: 72 BPM | RESPIRATION RATE: 15 BRPM | SYSTOLIC BLOOD PRESSURE: 111 MMHG | TEMPERATURE: 98.1 F | OXYGEN SATURATION: 96 %

## 2018-01-02 VITALS — OXYGEN SATURATION: 92 %

## 2018-01-02 LAB
BASOPHILS # BLD AUTO: 0.3 TH/MM3 (ref 0–0.2)
BASOPHILS NFR BLD: 1.1 % (ref 0–2)
EOSINOPHIL # BLD: 0.5 TH/MM3 (ref 0–0.4)
EOSINOPHIL NFR BLD: 2.2 % (ref 0–4)
ERYTHROCYTE [DISTWIDTH] IN BLOOD BY AUTOMATED COUNT: 19.4 % (ref 11.6–17.2)
HCT VFR BLD CALC: 46.9 % (ref 35–46)
HGB BLD-MCNC: 14.9 GM/DL (ref 11.6–15.3)
LYMPHOCYTES # BLD AUTO: 0.9 TH/MM3 (ref 1–4.8)
LYMPHOCYTES NFR BLD AUTO: 3.9 % (ref 9–44)
LYMPHOCYTES: 6 % (ref 9–44)
MCH RBC QN AUTO: 21.9 PG (ref 27–34)
MCHC RBC AUTO-ENTMCNC: 31.8 % (ref 32–36)
MCV RBC AUTO: 68.9 FL (ref 80–100)
MONOCYTE #: 0.9 TH/MM3 (ref 0–0.9)
MONOCYTES NFR BLD: 4 % (ref 0–8)
MONOCYTES: 2 % (ref 0–8)
MYELOCYTES NFR BLD: 1 % (ref 0–0)
NEUTROPHILS # BLD AUTO: 20.9 TH/MM3 (ref 1.8–7.7)
NEUTROPHILS NFR BLD AUTO: 88.8 % (ref 16–70)
NEUTS BAND # BLD MANUAL: 21.6 TH/MM3 (ref 1.8–7.7)
NEUTS BAND NFR BLD: 14 % (ref 0–6)
NEUTS SEG NFR BLD MANUAL: 77 % (ref 16–70)
OVALOCYTES BLD QL SMEAR: (no result)
PLATELET # BLD: 1080 TH/MM3 (ref 150–450)
PMV BLD AUTO: 8.1 FL (ref 7–11)
RBC # BLD AUTO: 6.82 MIL/MM3 (ref 4–5.3)
WBC # BLD AUTO: 23.5 TH/MM3 (ref 4–11)

## 2018-01-02 RX ADMIN — METOPROLOL TARTRATE SCH MG: 25 TABLET, FILM COATED ORAL at 11:58

## 2018-01-02 RX ADMIN — METOPROLOL TARTRATE SCH MG: 25 TABLET, FILM COATED ORAL at 21:19

## 2018-01-02 RX ADMIN — IPRATROPIUM BROMIDE AND ALBUTEROL SULFATE SCH AMPULE: .5; 3 SOLUTION RESPIRATORY (INHALATION) at 07:15

## 2018-01-02 RX ADMIN — HYDROXYZINE HYDROCHLORIDE PRN MG: 50 TABLET, FILM COATED ORAL at 21:17

## 2018-01-02 RX ADMIN — FUROSEMIDE SCH MG: 10 INJECTION, SOLUTION INTRAMUSCULAR; INTRAVENOUS at 09:00

## 2018-01-02 RX ADMIN — IPRATROPIUM BROMIDE AND ALBUTEROL SULFATE SCH AMPULE: .5; 3 SOLUTION RESPIRATORY (INHALATION) at 14:00

## 2018-01-02 RX ADMIN — ATORVASTATIN CALCIUM SCH MG: 80 TABLET, FILM COATED ORAL at 21:17

## 2018-01-02 RX ADMIN — FLUCONAZOLE SCH MG: 200 TABLET ORAL at 11:56

## 2018-01-02 RX ADMIN — IPRATROPIUM BROMIDE AND ALBUTEROL SULFATE SCH AMPULE: .5; 3 SOLUTION RESPIRATORY (INHALATION) at 19:37

## 2018-01-02 RX ADMIN — DIGOXIN SCH MG: 125 TABLET ORAL at 11:55

## 2018-01-02 RX ADMIN — IPRATROPIUM BROMIDE AND ALBUTEROL SULFATE PRN AMPULE: .5; 3 SOLUTION RESPIRATORY (INHALATION) at 22:41

## 2018-01-02 RX ADMIN — POTASSIUM CHLORIDE SCH MEQ: 750 CAPSULE, EXTENDED RELEASE ORAL at 11:56

## 2018-01-02 RX ADMIN — SODIUM CHLORIDE SCH MLS/HR: 900 INJECTION INTRAVENOUS at 11:00

## 2018-01-02 RX ADMIN — CLOPIDOGREL BISULFATE SCH MG: 75 TABLET, FILM COATED ORAL at 11:54

## 2018-01-02 NOTE — RADRPT
EXAM DATE/TIME:  01/02/2018 20:54 

 

[facility] COMPARISON:     

No previous studies available for comparison.

        

 

 

INDICATIONS :     

Increased lab values. 

                     

 

MEDICAL HISTORY :     

Hypercholesterolemia. Hypertension.   A-fib. Cerebrovascular accident. Peripheral vascular disease.

 

SURGICAL HISTORY :     

Tonsillectomy. Tubal ligation.   Cardiac catheterization. Femoral bypass.

 

ENCOUNTER:     

Initial

 

ACUITY:     

1 day

 

PAIN SCORE:     

0/10

 

LOCATION:     

Bilateral upper quadrant 

                     

MEASUREMENTS:     

 

LIVER:     

13.4 cm length 

 

COMMON DUCT:     

3 mm

 

RIGHT KIDNEY:     

9.8 x 4.0 x 3.9 cm

 

SPLEEN:     

12.5 cm length

 

FINDINGS:     

 

LIVER:     

Normal echotexture without focal lesion or ductal dilatation.  Hepatopedal flow.

 

COMMON DUCT:     

No intraluminal mass or stone visualized.  

 

GALLBLADDER:     

Contains no stones, demonstrates no wall thickening or pericholecystic fluid.  

 

PANCREAS:     

The visualized portions are within normal limits.  

 

RIGHT KIDNEY:     

No hydronephrosis, stone or mass.  Minimal perinephric fluid. Diffusely echogenic.

 

SPLEEN:     

No focal lesion.  

 

CONCLUSION:     

1. Echogenic right kidney which can be seen with medical renal disease. 

2. Minimal nonspecific perinephric fluid.

3. Spleen is at the upper limits of normal in size.

4. No evidence for cholelithiasis.

 

 

 

 Johnson Salinas MD on January 02, 2018 at 21:29           

Board Certified Radiologist.

 This report was verified electronically.

## 2018-01-02 NOTE — HHI.PYPN
Subjective


Chief Complaint:  patient confused unable to care for self found lying in her 

own waste produ


Remarks


Patient seen in her room the floor staff, chart reviewed, patient compliant 

medication.  Patient laying quietly in bed though somewhat irritable with me 

saying she wants to go home, while to send me home, patient showed some 

increased irritability lability towards later afternoon and evening.  The was 

slight increase in cervical yesterday to 25 mg twice a day.  Will increase 

Seroquel to 25 mg 8 AM 2 PM and 8 PM





Review of Systems


Except as stated in HPI:  all other systems reviewed are Neg





Mental Status Examination


Appearance:  Appropriate


Consciousness:  Alert


Orientation:  Person, Place, Situation (knows why she is in the hospital)


Motor Activity:  Other (patient bedridden at this time)


Speech:  Hesitant


Language:  Adequate


Fund of Knowledge:  Inadequate


Attention and Concentration:  Other (poor)


Memory:  Impaired


Mood:  Other (euthymic to somewhat restricted)


Affect:  Other (decreased range and intensity)


Thought Process & Associations:  Linear


Thought Content:  Other (disorganized)


Hallucination Type:  None (denies)


Delusion Type:  None


Suicidal Ideation:  No


Suicidal Plan:  No


Suicidal Intention:  No


Homicidal Ideation:  No


Homicidal Plan:  No


Homicidal Intention:  No


Insight:  Poor


Judgment:  Poor





Results


Labs











 Date/Time


Source Procedure


Growth Status


 


 


 12/29/17 11:50


Urine Other Urine Culture - Final


Candida Albicans Complete








Vitals/IOs





Vital Signs








  Date Time  Temp Pulse Resp B/P (MAP) Pulse Ox O2 Delivery O2 Flow Rate FiO2


 


1/2/18 06:11 98.1 72 15 111/68 (82) 96   


 


1/1/18 21:59        21


 


1/1/18 19:11      Nasal Cannula 2.00 














Intake and Output   


 


 1/2/18 1/2/18 1/3/18





 08:00 16:00 00:00


 


Intake Total 120 ml  


 


Output Total 700 ml  


 


Balance -580 ml  











Assessment & Plan


Problem List:  


(1) OTHER ALZHEIMER'S DISEASE


ICD Codes:  G30.8 - OTHER ALZHEIMER'S DISEASE


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 

disturbance


ICD Codes:  F02.80 - Dementia in other diseases classified elsewhere without 

behavioral disturbance


Assessment & Plan


Estimated LOS:  days patient continues confused disoriented labile and somewhat 

irritable more towards laying afternoon and evening see medication adjustment 

above


Justification for Cont. Inpt.


At this time patient will decompensate placed in a lower level of care


Discharge Planning


Placement may become somewhat problematic


Request HC Surrog/Guard Advoc?:  Yes











Emir Shultz MD Jan 2, 2018 10:19

## 2018-01-02 NOTE — HHI.PR
Subjective


Remarks


pt seen about an hour ago


lying in bed 


sleeping


not answering questions much 


but during the morning, as I was rounding on other patients in the units, she 

was much more awake, alert, and talking to nurse 


still has munoz


afebrile


falt affect,not ambulating much





Objective


Vitals





Vital Signs








  Date Time  Temp Pulse Resp B/P (MAP) Pulse Ox O2 Delivery O2 Flow Rate FiO2


 


1/2/18 06:11 98.1 72 15 111/68 (82) 96   


 


1/1/18 21:59        21


 


1/1/18 19:11      Nasal Cannula 2.00 21


 


1/1/18 18:00 97.3 82 16 148/62 (90) 95   














I/O      


 


 1/1/18 1/1/18 1/1/18 1/2/18 1/2/18 1/2/18





 07:00 15:00 23:00 07:00 15:00 23:00


 


Intake Total 0 ml 580 ml 960 ml  120 ml 


 


Output Total 400 ml 750 ml  700 ml  


 


Balance -400 ml -170 ml 960 ml -700 ml 120 ml 


 


      


 


Intake Oral 0 ml 480 ml 960 ml  120 ml 


 


IV Total  100 ml    


 


Output Urine Total 400 ml 750 ml  700 ml  


 


Bladder Scan Volume Amount  999 ml    


 


# Voids  1 3   








Result Diagram:  


1/2/18 1128                                                                    

            1/1/18 0526





Objective Remarks


GENERAL: This is an elderly lady, sleeping during my interview and exam.  Would 

not wake up.  However she was noted to be much more awake talking to the nurse 

during the morning before her lunch time.


SKIN: Warm and dry.  Small superficial ecchymoses.  Right lower extremity 

distal femoral area with surgical sutures


HEENT: Normocephalic. . Airway patent.


NECK: Trachea midline. No JVD. Supple.


CARDIOVASCULAR: Regular rate and rhythm without murmurs, gallops, or rubs. 


RESPIRATORY: No wheezes, rales, or rhonchi.  Diminished bases.  Poor 

inspiratory effort


GASTROINTESTINAL: Abdomen soft, non-tender, nondistended.  now with munoz, no 

suprapubic tenderness


MUSCULOSKELETAL: Extremities without clubbing, cyanosis.  RLE trace edema


NEUROLOGICAL: Mostly sleeping through my exam.. Oriented to  place, person. 

Moves all extremities. Normal speech.





A/P


Problem List:  


(1) History of endarterectomy


ICD Code:  Z98.890 - Other specified postprocedural states


(2) Dyspnea


ICD Code:  R06.00 - Dyspnea, unspecified


(3) Pain


ICD Code:  R52 - Pain, unspecified


(4) Thrombocytosis


ICD Code:  D47.3 - Essential (hemorrhagic) thrombocythemia


Status:  Chronic


(5) UTI (urinary tract infection)


ICD Code:  N39.0 - Urinary tract infection, site not specified


(6) Peripheral vascular disease


ICD Code:  I73.9 - Peripheral vascular disease, unspecified


Status:  Acute


Assessment and Plan


77 years old female who was admitted to medical service from December 17, 2017 

to December 28, 2017.


She was found to have severe PAD requiring right femoropopliteal bypass and 

femoral endarterectomy during that hospitalization.


Course was complicated by UTI and pneumonia.


Patient reports no primary care doctor in no follow-up with no medications 

prior to this hospitalization.  Nursing staff reported patient has no family 

members that they were able to reach out to as well.





Impression/plan:








Suprapubic tenderness on 1/1/18


bladder scan showing >900cc


munoz inserted 


likely from not ambulating








Dementia.   management per psychiatry.


UTI.  Candida growing in cultures.  On Diflucan.


Pneumonia.  Resolved.  Asymptomatic at present.  Continue current antibiotics 

regimen with Rocephin.





Leukocytosis/thrombocytosis/erythrocytosis


Patient denies any prior history of this.  Previous admission in early December 

reveals was erythrocytosis in a smoker.


Patient has history of CVA, severe PAD, A. fib.  High risk for clot formations.


hematology was consulted, recommendations reviewed- will fu workup








S/P Fem/Pop Bypass


   - 12/22/17 exploration of the left groin, left common femoral and external 

iliac artery. Endarterectomy and patch angioplasty. External and common artery 

thromboembolectomy, superficial femoral popliteal artery thromboembolectomy, 

arteriogram.


   - 12/26/17 Right external iliac-common femoral endarterectomy and patch 

angioplasty and right femoral-popliteal bypass, PTFE graft





Atrial Fibrillation.  Rate controlled.  Anticoagulation needs to be discussed.  

However per nursing staff, patient is a very high risk for falls and she did in 

fact fell while in hospital on December 29, 2017.  Currently patient is on 

Plavix for severe PAD.


Based on her discharge planning and risk of falls, we will reconsider 

anticoagulation.


At this point, anticoagulation with further stronger anticoagulants is 

contraindicated due to high risk of falls.


Was seen by cardiology during this hospitalization.  Due to abnormal EKG prior 

to the OR by vascular surgeon for femoropopliteal, cardiology clearance was 

obtained.








HTN


Hyperlipidemia


   - Abnormal EKG in inpatient with abnormal stress test and subsequent cardia 

cath was done?


   - Continue with Plavix 75 mg daily, metoprolol 25 mg by mouth every 12 hours

, atorvastatin 80 mg daily at bedtime, digoxin 0.125 mg daily








DVT prop - start lovenox, early ambulation PT


Discharge Planning


per case management and psychiatry team.











Edson Bhat MD Jan 2, 2018 14:42

## 2018-01-02 NOTE — PD.ONC.PN
Subjective


Subjective


Remarks


Afebrile overnight. 


Patient resting in room. 


Does not remember why she is in the hospital. "I get asked that every day!. I 

dont' know!"


Denies pain. 


No bleeding.





Objective


Data











  Date Time  Temp Pulse Resp B/P (MAP) Pulse Ox O2 Delivery O2 Flow Rate FiO2


 


1/2/18 06:11 98.1 72 15 111/68 (82) 96   


 


1/1/18 21:59        21


 


1/1/18 19:11      Nasal Cannula 2.00 21


 


1/1/18 18:00 97.3 82 16 148/62 (90) 95   


 


1/1/18 10:18      Nasal Cannula 2.00 21














 1/2/18 1/2/18 1/2/18





 07:00 15:00 23:00


 


Intake Total  120 ml 


 


Output Total 700 ml  


 


Balance -700 ml 120 ml 








Result Diagram:  


1/1/18 0526 1/1/18 0526








Administered Medications








 Medications


  (Trade)  Dose


 Ordered  Sig/Carlitos


 Route


 PRN Reason  Start Time


 Stop Time Status Last Admin


Dose Admin


 


 Atorvastatin


 Calcium


  (Lipitor)  80 mg  HS


 PO


   12/28/17 21:00


    1/1/18 21:35


 


 


 Clopidogrel


 Bisulfate


  (Plavix)  75 mg  DAILY


 PO


   12/28/17 17:30


    1/1/18 09:07


 


 


 Digoxin


  (Lanoxin)  0.125 mg  DAILY


 PO


   12/28/17 17:30


    1/1/18 09:07


 


 


 Metoprolol


 Tartrate


  (Lopressor)  25 mg  Q12HR


 PO


   12/28/17 21:00


    1/1/18 21:35


 


 


 Acetaminophen


  (Tylenol)  650 mg  Q4H  PRN


 PO


 Pain 1-5 or Temp >101F  12/28/17 17:30


    1/1/18 09:17


 


 


 Ceftriaxone


 Sodium 1000 mg/


 Sodium Chloride  100 ml @ 


 200 mls/hr  Q24H


 IV


   12/29/17 11:00


    1/1/18 11:00


 


 


 Albuterol/


 Ipratropium


  (Duoneb Neb)  1 ampule  Q4HR NEB  PRN


 NEB


 wheezing  12/29/17 13:45


    12/30/17 01:04


 


 


 Clonidine


  (Catapres)  0.1 mg  Q6H  PRN


 PO


 SBP > 160  12/29/17 17:30


    12/29/17 18:14


 


 


 Albuterol/


 Ipratropium


  (Duoneb Neb)  1 ampule  Q6HR WHILE AWAKE  NEB


 NEB


   12/30/17 20:00


    12/31/17 11:04


 


 


 Furosemide


  (Lasix Inj)  20 mg  DAILY


 IV PUSH


   12/30/17 15:15


 1/2/18 15:14  1/1/18 09:08


 


 


 Potassium Chloride


  (KCl)  10 meq  DAILY


 PO


   12/30/17 15:15


 1/2/18 15:14  1/1/18 09:00


 


 


 Fluconazole


  (Diflucan)  200 mg  DAILY


 PO


   12/31/17 09:00


 1/3/18 08:59  1/1/18 09:07


 








Objective Remarks


GENERAL: Frail elderly female, lying in bed. she is sleeping in approach, but 

awakens when I speak to her.


SKIN: Warm and dry. healing incisions along bilateral inguinal creases. no 

petechiae.


HEAD: Normocephalic.


EYES: No injection or drainage. 


NECK: Supple, trachea midline. 


CARDIOVASCULAR: +S1/S2


RESPIRATORY: diminished at bases, anterior fields clear. 


GASTROINTESTINAL: Abdomen soft, non-tender, nondistended. 


EXTREMITIES: No cyanosis, or edema. no calf tenderness. distal extremities warm 

and well perfused. 


MUSCULOSKELETAL: Adequate muscle tone.


NEUROLOGICAL: awake. oriented to self. does not know date. does not know name 

of where she is.





Assessment/Plan


Problem List:  


(1) Leukocytosis


ICD Codes:  D72.829 - Elevated white blood cell count, unspecified


Plan:  --likely reactive d/t recent major surgical procedures including 

endarterectomy and bypass surgery as well as urinary tract infection and 

pneumonia. 


--b12/folate ENL


--iron studies show low TIBC, serum iron % saturation and ferritin, usually 

with iron deficiency anemia TIBC is normal or elevated--may indicate a mixed 

picture. 


--will obtain U/S spleen and Jak2, BCR/ABL





(2) Thrombocytosis


ICD Codes:  D47.3 - Essential (hemorrhagic) thrombocythemia


Status:  Chronic


Plan:  --likely reactive d/t recent major surgical procedures including 

endarterectomy and bypass surgery as well as urinary tract infection and 

pneumonia. 


--b12/folate ENL


--iron studies show low TIBC, serum iron % saturation and ferritin, usually 

with iron deficiency anemia TIBC is normal or elevated--may indicate a mixed 

picture. 


--will obtain U/S spleen and Jak2, BCR/ABL





Assessment


77-year-old lady admitted in December 2017 with severe peripheral arterial 

disease requiring right femoropopliteal bypass and femoral endarterectomy.  

Postoperative course was complicated by urinary tract infection and pneumonia. 

  Hematology consulted for leukocytosis and thrombocytosis.


h/o Hyperlipidemia, Hypertension


Plan


1. check BCR/ABL + JAK2


2. obtain u/s liver/spleen


3. obtain ESR/CRP


4. await peripheral smear review.


Attending Statement


The exam, history, and the medical decision-making described in the above note 

were completed with the assistance of the mid-level provider. I reviewed and 

agree with the findings presented.  I attest that I had a face-to-face 

encounter with the patient on the same day, and personally performed and 

documented my assessment and findings in the medical record. 77 yoF with PAD s/

p surgical repair with post operative course complicated by infection currently 

hospitalized [facility].  Iron profile, B12, folate unrevealing.  No 

evidence of acute blood loss, hemolysis, vasculitis, rheumatologic condition.  

Will evaluate for underlying myeloproliferative neoplasm with JAK2, BCRABL, 

abdominal u/s. Lovneox for VTE ppx.











Laila Belcher Jan 2, 2018 10:22


Esthela Smith MD John 3, 2018 00:21

## 2018-01-03 VITALS
RESPIRATION RATE: 17 BRPM | OXYGEN SATURATION: 95 % | SYSTOLIC BLOOD PRESSURE: 134 MMHG | DIASTOLIC BLOOD PRESSURE: 59 MMHG | TEMPERATURE: 97.7 F | HEART RATE: 86 BPM

## 2018-01-03 VITALS
DIASTOLIC BLOOD PRESSURE: 65 MMHG | SYSTOLIC BLOOD PRESSURE: 120 MMHG | TEMPERATURE: 97.3 F | RESPIRATION RATE: 16 BRPM | HEART RATE: 81 BPM | OXYGEN SATURATION: 97 %

## 2018-01-03 VITALS — OXYGEN SATURATION: 93 %

## 2018-01-03 VITALS — OXYGEN SATURATION: 92 %

## 2018-01-03 LAB
BASOPHILS # BLD AUTO: 0 TH/MM3 (ref 0–0.2)
BASOPHILS NFR BLD AUTO: 1 % (ref 0–2)
BASOPHILS NFR BLD: 0.2 % (ref 0–2)
EOSINOPHIL # BLD: 0.5 TH/MM3 (ref 0–0.4)
EOSINOPHIL NFR BLD: 2.5 % (ref 0–4)
ERYTHROCYTE [DISTWIDTH] IN BLOOD BY AUTOMATED COUNT: 19.2 % (ref 11.6–17.2)
HCT VFR BLD CALC: 44.8 % (ref 35–46)
HGB BLD-MCNC: 14.4 GM/DL (ref 11.6–15.3)
LYMPHOCYTES # BLD AUTO: 1.1 TH/MM3 (ref 1–4.8)
LYMPHOCYTES NFR BLD AUTO: 5 % (ref 9–44)
LYMPHOCYTES: 4 % (ref 9–44)
MCH RBC QN AUTO: 22.3 PG (ref 27–34)
MCHC RBC AUTO-ENTMCNC: 32.1 % (ref 32–36)
MCV RBC AUTO: 69.4 FL (ref 80–100)
MONOCYTE #: 0.9 TH/MM3 (ref 0–0.9)
MONOCYTES NFR BLD: 4.3 % (ref 0–8)
MONOCYTES: 6 % (ref 0–8)
NEUTROPHILS # BLD AUTO: 19 TH/MM3 (ref 1.8–7.7)
NEUTROPHILS NFR BLD AUTO: 88 % (ref 16–70)
NEUTS BAND # BLD MANUAL: 19.2 TH/MM3 (ref 1.8–7.7)
NEUTS BAND NFR BLD: 3 % (ref 0–6)
NEUTS SEG NFR BLD MANUAL: 86 % (ref 16–70)
OVALOCYTES BLD QL SMEAR: (no result)
PLATELET # BLD: 1038 TH/MM3 (ref 150–450)
PMV BLD AUTO: 8.5 FL (ref 7–11)
RBC # BLD AUTO: 6.46 MIL/MM3 (ref 4–5.3)
WBC # BLD AUTO: 21.6 TH/MM3 (ref 4–11)

## 2018-01-03 RX ADMIN — IPRATROPIUM BROMIDE AND ALBUTEROL SULFATE PRN AMPULE: .5; 3 SOLUTION RESPIRATORY (INHALATION) at 20:03

## 2018-01-03 RX ADMIN — CLOPIDOGREL BISULFATE SCH MG: 75 TABLET, FILM COATED ORAL at 09:00

## 2018-01-03 RX ADMIN — DIGOXIN SCH MG: 125 TABLET ORAL at 09:00

## 2018-01-03 RX ADMIN — METOPROLOL TARTRATE SCH MG: 25 TABLET, FILM COATED ORAL at 09:00

## 2018-01-03 RX ADMIN — ATORVASTATIN CALCIUM SCH MG: 80 TABLET, FILM COATED ORAL at 20:46

## 2018-01-03 RX ADMIN — METOPROLOL TARTRATE SCH MG: 25 TABLET, FILM COATED ORAL at 20:47

## 2018-01-03 RX ADMIN — APIXABAN SCH MG: 5 TABLET, FILM COATED ORAL at 20:46

## 2018-01-03 RX ADMIN — IPRATROPIUM BROMIDE AND ALBUTEROL SULFATE SCH AMPULE: .5; 3 SOLUTION RESPIRATORY (INHALATION) at 13:55

## 2018-01-03 RX ADMIN — IPRATROPIUM BROMIDE AND ALBUTEROL SULFATE SCH AMPULE: .5; 3 SOLUTION RESPIRATORY (INHALATION) at 10:09

## 2018-01-03 RX ADMIN — SODIUM CHLORIDE SCH MLS/HR: 900 INJECTION INTRAVENOUS at 11:00

## 2018-01-03 NOTE — PD.CAR.PN
CVT Progress Note


Subjective/Hospital Course:


This 77-year-old lady was admitted through the emergency room yesterday for


generalized weakness.  The patient is a very poor historian, barely says


anything. Apparently, she was at home for the last week or so barely doing


anything, too weak to get up or eat or drink.  The patient came to the hospital


dirty, smelling of feces and urine.  She was diagnosed with sepsis based in the


urinary tract infection. At the time of arrival she was noted to have cyanotic


discoloration of the right foot and hence, the consultation.


 


It should be noted that the patient is not following up with doctors. She 

apparently smoked about one


pack a day since age of 20 and stopped somewhere in the 70s.





Course of recent admission


As noted above patient was admitted with critical acute, superimposed on 

chronic ischemia of the right leg and in the process of workup for surgery 

underwent cardiac catheterization through her left groin.


This resulted then the thromboembolic occlusion of the left side circulation 

and patient underwent emergent reconstruction of the left external iliac, 

common femoral and superficial femoral arteries first.


This was followed few days later with a right sided reconstruction of the 

external iliac common femoral artery and femoropopliteal bypass


Incisions are clean and dry


Excellent flow to the both feet


Patient remains on Plavix by mouth but from my point can be anticoagulated if 

so desired by medicine in face of chronic A. fib


All the closures are subcuticular and no sutures are to be removed.


Nothing to add to care at this time


Patient can ambulate as tolerated can take daily showers and leave incisions 

open to air


1/2/18


Called by the RN taking care of the patient re. discharge.


Patient had successful reconstruction of the flow to both legs.  


CT ordered by hospitalist today shows expected post-op changes.


No abnormal findings. 


I have not seen patient today, but she can be DC to SNF from my point.


Objective:





Vital Signs








  Date Time  Temp Pulse Resp B/P (MAP) Pulse Ox O2 Delivery O2 Flow Rate FiO2


 


1/3/18 10:10     93 Nasal Cannula 2.00 


 


1/3/18 09:05      Room Air  


 


1/3/18 06:00 97.3 81 16 120/65 (83) 97   


 


1/3/18 02:24     90 Nasal Cannula 2.00 


 


1/2/18 22:43     92 Nasal Cannula 2.00 


 


1/2/18 18:00 97.6 74 18 101/59 (73) 95   








Result Diagram:  


1/3/18 0445                                                                    

            1/1/18 0526








(1) CAD (coronary artery disease)


(2) Cardiomyopathy


(3) Hypertension


(4) Atrial fibrillation


Plan:  Will plan to start anticoagulants as she is going to a supervised 

facility. FU with Dr Gerard as o/p.





(5) History of endarterectomy


(6) Thrombocytosis


(7) Peripheral vascular disease











Amber Acosta MD John 3, 2018 17:36

## 2018-01-03 NOTE — PD.CARD.PN
Subjective


Subjective Remarks


Discussed with the RN. No acute cardiac events. Hemodynamically stable. We were 

reconsulted for anticoagulation recommendations. 


 (Jessa Calvo)





Objective


Medications





Current Medications








 Medications


  (Trade)  Dose


 Ordered  Sig/Carlitos


 Route  Start Time


 Stop Time Status Last Admin


 


  (Lipitor)  80 mg  HS


 PO  12/28/17 21:00


    1/2/18 21:17


 


 


  (Plavix)  75 mg  DAILY


 PO  12/28/17 17:30


    1/3/18 09:00


 


 


  (Lanoxin)  0.125 mg  DAILY


 PO  12/28/17 17:30


    1/3/18 09:00


 


 


  (Lopressor)  25 mg  Q12HR


 PO  12/28/17 21:00


    1/3/18 09:00


 


 


  (Tylenol)  650 mg  Q4H  PRN


 PO  12/28/17 17:30


    1/1/18 09:17


 


 


  (Milk Of


 Magnesia Liq)  30 ml  DAILY  PRN


 PO  12/28/17 17:30


     


 


 


  (Mag-Al Plus


 Susp Liq)  30 ml  Q6H  PRN


 PO  12/28/17 17:30


     


 


 


  (Atarax)  50 mg  Q6H  PRN


 PO  12/29/17 09:15


    1/2/18 21:17


 


 


 Ceftriaxone


 Sodium 1000 mg/


 Sodium Chloride  100 ml @ 


 200 mls/hr  Q24H


 IV  12/29/17 11:00


    1/3/18 11:00


 


 


  (Pill Splitter)  1 ea  UNSCH  PRN


 OTHER  12/29/17 10:30


     


 


 


  (Reglan)  10 mg  ACHS  PRN


 PO  12/29/17 13:30


   Future Hold  


 


 


  (Zofran  Odt)  4 mg  Q6H  PRN


 PO  12/29/17 13:30


   Future Hold  


 


 


  (Duoneb Neb)  1 ampule  Q4HR NEB  PRN


 NEB  12/29/17 13:45


    1/2/18 22:41


 


 


  (Catapres)  0.1 mg  Q6H  PRN


 PO  12/29/17 17:30


    12/29/17 18:14


 


 


  (Duoneb Neb)  1 ampule  Q6HR WHILE AWAKE  NEB


 NEB  12/30/17 20:00


    1/3/18 13:55


 


 


  (SEROquel)  25 mg  TID@0800,1400,2000


 PO  1/2/18 14:00


    1/2/18 21:17


 


 


  (Lovenox Inj)  30 mg  Q24H


 SQ  1/3/18 09:00


    1/3/18 09:00


 








Vital Signs / I&O





Vital Signs








  Date Time  Temp Pulse Resp B/P (MAP) Pulse Ox O2 Delivery O2 Flow Rate FiO2


 


1/3/18 10:10     93 Nasal Cannula 2.00 


 


1/3/18 09:05      Room Air  


 


1/3/18 06:00 97.3 81 16 120/65 (83) 97   


 


1/3/18 02:24     90 Nasal Cannula 2.00 


 


1/2/18 22:43     92 Nasal Cannula 2.00 


 


1/2/18 18:00 97.6 74 18 101/59 (73) 95   














I/O      


 


 1/2/18 1/2/18 1/2/18 1/3/18 1/3/18 1/3/18





 07:00 15:00 23:00 07:00 15:00 23:00


 


Intake Total  480 ml  0 ml 480 ml 


 


Output Total 700 ml 1000 ml  100 ml  


 


Balance -700 ml -520 ml  -100 ml 480 ml 


 


      


 


Intake Oral  480 ml  0 ml 480 ml 


 


Output Urine Total 700 ml 1000 ml  100 ml  








Physical Exam


GENERAL: Nonverbal female in rm 402


SKIN: Warm and dry.


HEAD: Normocephalic.


EYES: No scleral icterus. No injection or drainage. 


NECK: Supple, trachea midline. 


CARDIOVASCULAR: Irreg irreg, reg rate


RESPIRATORY: Breath sounds equal bilaterally. No accessory muscle use.


GASTROINTESTINAL: Abdomen soft, non-tender, nondistended. thin


MUSCULOSKELETAL: No cyanosis, or edema. 


BACK: Nontender without obvious deformity.





Laboratory





Laboratory Tests








Test


  1/3/18


04:45


 


White Blood Count 21.6 TH/MM3 


 


Red Blood Count 6.46 MIL/MM3 


 


Hemoglobin 14.4 GM/DL 


 


Hematocrit 44.8 % 


 


Mean Corpuscular Volume 69.4 FL 


 


Mean Corpuscular Hemoglobin 22.3 PG 


 


Mean Corpuscular Hemoglobin


Concent 32.1 % 


 


 


Red Cell Distribution Width 19.2 % 


 


Platelet Count 1038 TH/MM3 


 


Mean Platelet Volume 8.5 FL 


 


Neutrophils (%) (Auto) 88.0 % 


 


Lymphocytes (%) (Auto) 5.0 % 


 


Monocytes (%) (Auto) 4.3 % 


 


Eosinophils (%) (Auto) 2.5 % 


 


Basophils (%) (Auto) 0.2 % 


 


Neutrophils # (Auto) 19.0 TH/MM3 


 


Lymphocytes # (Auto) 1.1 TH/MM3 


 


Monocytes # (Auto) 0.9 TH/MM3 


 


Eosinophils # (Auto) 0.5 TH/MM3 


 


Basophils # (Auto) 0.0 TH/MM3 


 


CBC Comment AUTO DIFF 


 


Differential Total Cells


Counted 100 


 


 


Neutrophils % (Manual) 86 % 


 


Band Neutrophils % 3 % 


 


Lymphocytes % 4 % 


 


Monocytes % 6 % 


 


Basophils % 1 % 


 


Neutrophils # (Manual) 19.2 TH/MM3 


 


Differential Comment


  FINAL DIFF


MANUAL


 


Platelet Estimate HIGH 


 


Platelet Morphology Comment ENLARGED 


 


Ovalocytes 1+ 








Imaging





Last 72 hours Impressions








Liver Ultrasound 1/2/18 0000 Signed





Impressions: 





 Service Date/Time:  Tuesday, January 2, 2018 20:54 - CONCLUSION:  1. Echogenic 





 right kidney which can be seen with medical renal disease.  2. Minimal 





 nonspecific perinephric fluid. 3. Spleen is at the upper limits of normal in 





 size. 4. No evidence for cholelithiasis.     Johnson Salinas MD 


 


Chest X-Ray 1/1/18 0600 Signed





Impressions: 





 Service Date/Time:  Monday, January 1, 2018 03:17 - CONCLUSION:  New right 

lower 





 lobe infiltrate. Stable interstitial changes.     Mike Dixon Jr., MD 








 (Jessa Calvo)





Assessment and Plan


Problem List:  


(1) CAD (coronary artery disease)


ICD Codes:  I25.10 - Atherosclerotic heart disease of native coronary artery 

without angina pectoris


(2) Cardiomyopathy


ICD Codes:  I42.9 - Cardiomyopathy, unspecified


(3) Hypertension


ICD Codes:  I10 - Essential (primary) hypertension


(4) Atrial fibrillation


ICD Codes:  I48.91 - Unspecified atrial fibrillation


(5) History of endarterectomy


ICD Codes:  Z98.890 - Other specified postprocedural states


(6) Thrombocytosis


ICD Codes:  D47.3 - Essential (hemorrhagic) thrombocythemia


Status:  Chronic


(7) Peripheral vascular disease


ICD Codes:  I73.9 - Peripheral vascular disease, unspecified


Status:  Acute


Assessment and Plan


Based on high CHADSVAC score (age, vascular disease, gender, CVA history, HTN) 

we will start Eliquis 5 mg BID and ASA 81 mg. We will stop Lovenox and Plavix. 

The patient will be receiving her medications in a controlled setting. 


The patient was seen and evaluated by Dr Montanez who completed face to face 

encounter and physical exam and participated in evaluation and management. 


 (Jessa Calvo)


Problem List:  


(1) CAD (coronary artery disease)


ICD Codes:  I25.10 - Atherosclerotic heart disease of native coronary artery 

without angina pectoris


(2) Cardiomyopathy


ICD Codes:  I42.9 - Cardiomyopathy, unspecified


(3) Hypertension


ICD Codes:  I10 - Essential (primary) hypertension


(4) Atrial fibrillation


ICD Codes:  I48.91 - Unspecified atrial fibrillation


Plan:  Will plan to start anticoagulants as she is going to a supervised 

facility. FU with Dr Gerard as o/p.





(5) History of endarterectomy


ICD Codes:  Z98.890 - Other specified postprocedural states


(6) Thrombocytosis


ICD Codes:  D47.3 - Essential (hemorrhagic) thrombocythemia


Status:  Chronic


(7) Peripheral vascular disease


ICD Codes:  I73.9 - Peripheral vascular disease, unspecified


Status:  Acute (Germaine Montanez MD)











Jessa Calvo John 3, 2018 14:20


Germaine Montanez MD John 3, 2018 17:19

## 2018-01-03 NOTE — HHI.PYPN
Subjective


Chief Complaint:  patient confused unable to care for self found lying in her 

own waste produ


Remarks


Patient seen in her room with nurse nargis peña and counselor Radha.  Patient still 

medically fragile getting breathing treatments and her pulmonary issues 

addressed, her urinary tract issue addressed recovering from the the peripheral 

bypass surgery.  Patient though overall is alert fairly well oriented denies 

suicidality denies voices or visions.  Patient is aware of the fact that she is 

unable to live independently at this time is willing to go into a 

rehabilitation placement type tissue.  Patient still here under the [employer] act.  

She is scheduled for [facility] tomorrow.  I do question whether the patient 

really meets criteria to be retained under the [place] act.  I'll attempt to call 

the medical service.  (Patient may be better treated under a medical admission.

  At that time I would consider perhaps lift the [place] act allow him slated 

routine care of medically.





Review of Systems


Except as stated in HPI:  all other systems reviewed are Neg





Mental Status Examination


Appearance:  Appropriate


Consciousness:  Alert


Orientation:  Person, Place, Situation (knows why she is in the hospital)


Motor Activity:  Other (patient bedridden at this time)


Speech:  Hesitant


Language:  Adequate


Fund of Knowledge:  Inadequate


Attention and Concentration:  Other (poor)


Memory:  Impaired


Mood:  Other (euthymic to somewhat restricted)


Affect:  Other (decreased range and intensity)


Thought Process & Associations:  Linear


Thought Content:  Other (disorganized)


Hallucination Type:  None (denies)


Delusion Type:  None


Suicidal Ideation:  No


Suicidal Plan:  No


Suicidal Intention:  No


Homicidal Ideation:  No


Homicidal Plan:  No


Homicidal Intention:  No


Insight:  Poor


Judgment:  Poor





Results


Labs











Test


  1/2/18


11:28 1/3/18


04:45


 


White Blood Count 23.5 TH/MM3  21.6 TH/MM3 


 


Red Blood Count 6.82 MIL/MM3  6.46 MIL/MM3 


 


Hemoglobin 14.9 GM/DL  14.4 GM/DL 


 


Hematocrit 46.9 %  44.8 % 


 


Mean Corpuscular Volume 68.9 FL  69.4 FL 


 


Mean Corpuscular Hemoglobin 21.9 PG  22.3 PG 


 


Mean Corpuscular Hemoglobin


Concent 31.8 % 


  32.1 % 


 


 


Red Cell Distribution Width 19.4 %  19.2 % 


 


Platelet Count 1080 TH/MM3  1038 TH/MM3 


 


Mean Platelet Volume 8.1 FL  8.5 FL 


 


Neutrophils (%) (Auto) 88.8 %  88.0 % 


 


Lymphocytes (%) (Auto) 3.9 %  5.0 % 


 


Monocytes (%) (Auto) 4.0 %  4.3 % 


 


Eosinophils (%) (Auto) 2.2 %  2.5 % 


 


Basophils (%) (Auto) 1.1 %  0.2 % 


 


Neutrophils # (Auto) 20.9 TH/MM3  19.0 TH/MM3 


 


Lymphocytes # (Auto) 0.9 TH/MM3  1.1 TH/MM3 


 


Monocytes # (Auto) 0.9 TH/MM3  0.9 TH/MM3 


 


Eosinophils # (Auto) 0.5 TH/MM3  0.5 TH/MM3 


 


Basophils # (Auto) 0.3 TH/MM3  0.0 TH/MM3 


 


CBC Comment AUTO DIFF  AUTO DIFF 


 


Differential Total Cells


Counted 100 


  100 


 


 


Neutrophils % (Manual) 77 %  86 % 


 


Band Neutrophils % 14 %  3 % 


 


Lymphocytes % 6 %  4 % 


 


Monocytes % 2 %  6 % 


 


Neutrophils # (Manual) 21.6 TH/MM3  19.2 TH/MM3 


 


Myelocytes 1 %  


 


Differential Comment


  FINAL DIFF


MANUAL FINAL DIFF


MANUAL


 


Platelet Estimate HIGH  HIGH 


 


Platelet Morphology Comment ENLARGED  ENLARGED 


 


Ovalocytes 1+  1+ 


 


Erythrocyte Sedimentation Rate 1 mm/hr  


 


C-Reactive Protein 8.93 MG/DL  


 


Basophils %  1 % 














 Date/Time


Source Procedure


Growth Status


 


 


 12/29/17 11:50


Urine Other Urine Culture - Final


Candida Albicans Complete








Vitals/IOs





Vital Signs








  Date Time  Temp Pulse Resp B/P (MAP) Pulse Ox O2 Delivery O2 Flow Rate FiO2


 


1/3/18 10:10     93 Nasal Cannula 2.00 


 


1/3/18 06:00 97.3 81 16 120/65 (83)    


 


1/1/18 21:59        21














Intake and Output   


 


 1/3/18 1/3/18 1/4/18





 08:00 16:00 00:00


 


Intake Total 0 ml  


 


Output Total 100 ml  


 


Balance -100 ml  











Assessment & Plan


Problem List:  


(1) OTHER ALZHEIMER'S DISEASE


ICD Codes:  G30.8 - OTHER ALZHEIMER'S DISEASE


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 

disturbance


ICD Codes:  F02.80 - Dementia in other diseases classified elsewhere without 

behavioral disturbance


Assessment & Plan


Estimated LOS:  days patient showing some fair orientation, she is coping with 

the multiple medical issues which is being treated for also.  She denies 

suicidality voices or visions.  An attempt to his reach medical service to 

discuss possibility of her transfer to the medicine service with multiple call 

of perhaps a skilled nursing facility versus rehabilitation type facility


Justification for Cont. Inpt.


This time patient will decompensate and placed a lower level of care more 

prepped with her medical issues the mental health issues


Discharge Planning


To be determined and need for the consultation with the medical service


Request HC Surrog/Guard Advoc?:  Yes











Emir Shultz MD John 3, 2018 10:45

## 2018-01-03 NOTE — RADRPT
EXAM DATE/TIME:  01/03/2018 15:52 

 

[facility] COMPARISON:     

No previous studies available for comparison.

 

 

INDICATIONS :     

Post-op hematoma right femur vascular surgery.

                      

 

RADIATION DOSE:     

10.47 CTDIvol (mGy) 

 

 

MEDICAL HISTORY :     

Cardiovascular disease.  Cerebrovascular disease.  Hypertension.

 

SURGICAL HISTORY :         

vascular surgery right femur

 

ENCOUNTER:      

Initial

 

ACUITY:      

1 day

 

PAIN SCALE:      

0/10

 

LOCATION:       

Right  femur

 

TECHNIQUE:     

Volumetric scanning of the femur was performed.  Using automated exposure control and adjustment of t
he mA and/or kV according to patient size, radiation dose was kept as low as reasonably achievable to
 obtain optimal diagnostic quality images.   DICOM format image data is available electronically for 
review and comparison.  

 

FINDINGS:     

Femoropopliteal bypass graft is evident without significant seroma or inflammatory changes.

In spite of clinical findings there is only minimal deep hematoma just above the distal anastomosis a
nd tibia small amount of air.

Bony skeleton is intact.

There is no joint effusion.

 

CONCLUSION:     

Minimal deep hematoma distal anastomosis measuring 3.2 cm x 1.7 cm.

 

 

 

 Froilan Salazar MD FACR on January 03, 2018 at 16:09           

Board Certified Radiologist.

 This report was verified electronically.

## 2018-01-04 VITALS
HEART RATE: 82 BPM | SYSTOLIC BLOOD PRESSURE: 144 MMHG | TEMPERATURE: 98.4 F | RESPIRATION RATE: 18 BRPM | OXYGEN SATURATION: 95 % | DIASTOLIC BLOOD PRESSURE: 70 MMHG

## 2018-01-04 VITALS
OXYGEN SATURATION: 94 % | RESPIRATION RATE: 15 BRPM | SYSTOLIC BLOOD PRESSURE: 129 MMHG | DIASTOLIC BLOOD PRESSURE: 61 MMHG | HEART RATE: 77 BPM | TEMPERATURE: 98.9 F

## 2018-01-04 VITALS — OXYGEN SATURATION: 95 %

## 2018-01-04 LAB
BASOPHILS # BLD AUTO: 0.2 TH/MM3 (ref 0–0.2)
BASOPHILS NFR BLD: 0.9 % (ref 0–2)
EOSINOPHIL # BLD: 0.6 TH/MM3 (ref 0–0.4)
EOSINOPHIL NFR BLD: 2.5 % (ref 0–4)
ERYTHROCYTE [DISTWIDTH] IN BLOOD BY AUTOMATED COUNT: 19.9 % (ref 11.6–17.2)
HCT VFR BLD CALC: 43.3 % (ref 35–46)
HGB BLD-MCNC: 14.3 GM/DL (ref 11.6–15.3)
LYMPHOCYTES # BLD AUTO: 1.3 TH/MM3 (ref 1–4.8)
LYMPHOCYTES NFR BLD AUTO: 6 % (ref 9–44)
LYMPHOCYTES: 5 % (ref 9–44)
MCH RBC QN AUTO: 22.9 PG (ref 27–34)
MCHC RBC AUTO-ENTMCNC: 33.1 % (ref 32–36)
MCV RBC AUTO: 69.2 FL (ref 80–100)
MONOCYTE #: 0.9 TH/MM3 (ref 0–0.9)
MONOCYTES NFR BLD: 4 % (ref 0–8)
MONOCYTES: 2 % (ref 0–8)
MYELOCYTES NFR BLD: 1 % (ref 0–0)
NEUTROPHILS # BLD AUTO: 19.4 TH/MM3 (ref 1.8–7.7)
NEUTROPHILS NFR BLD AUTO: 86.6 % (ref 16–70)
NEUTS BAND # BLD MANUAL: 20.7 TH/MM3 (ref 1.8–7.7)
NEUTS BAND NFR BLD: 16 % (ref 0–6)
NEUTS SEG NFR BLD MANUAL: 75 % (ref 16–70)
PLATELET # BLD: 1146 TH/MM3 (ref 150–450)
PMV BLD AUTO: 8.9 FL (ref 7–11)
RBC # BLD AUTO: 6.26 MIL/MM3 (ref 4–5.3)
WBC # BLD AUTO: 22.5 TH/MM3 (ref 4–11)
WBC TOXIC VACUOLES BLD QL SMEAR: PRESENT

## 2018-01-04 RX ADMIN — HYDROXYZINE HYDROCHLORIDE PRN MG: 50 TABLET, FILM COATED ORAL at 21:10

## 2018-01-04 RX ADMIN — ATORVASTATIN CALCIUM SCH MG: 80 TABLET, FILM COATED ORAL at 21:11

## 2018-01-04 RX ADMIN — METOPROLOL TARTRATE SCH MG: 25 TABLET, FILM COATED ORAL at 09:12

## 2018-01-04 RX ADMIN — APIXABAN SCH MG: 5 TABLET, FILM COATED ORAL at 21:00

## 2018-01-04 RX ADMIN — ASPIRIN 81 MG SCH MG: 81 TABLET ORAL at 09:12

## 2018-01-04 RX ADMIN — APIXABAN SCH MG: 5 TABLET, FILM COATED ORAL at 09:12

## 2018-01-04 RX ADMIN — DIGOXIN SCH MG: 125 TABLET ORAL at 09:12

## 2018-01-04 RX ADMIN — APIXABAN SCH MG: 5 TABLET, FILM COATED ORAL at 21:12

## 2018-01-04 RX ADMIN — SODIUM CHLORIDE SCH MLS/HR: 900 INJECTION INTRAVENOUS at 11:00

## 2018-01-04 RX ADMIN — ESCITALOPRAM OXALATE SCH MG: 10 TABLET, FILM COATED ORAL at 15:45

## 2018-01-04 RX ADMIN — METOPROLOL TARTRATE SCH MG: 25 TABLET, FILM COATED ORAL at 21:11

## 2018-01-04 NOTE — HHI.PR
Subjective


Remarks


CARDIOLOGY AND VASCULAR BOTH AGREE THAT PATIENT NEEDS TO BE ON ANTICOAGULATION 

WITH ELIQUIS 5MG PO BID


DW RN AND PT


NO NEW COMPLAINTS AT THIS TIME





Objective


Vitals





Vital Signs








  Date Time  Temp Pulse Resp B/P (MAP) Pulse Ox O2 Delivery O2 Flow Rate FiO2


 


1/4/18 08:33     95   21


 


1/4/18 06:13 98.4 82 18 144/70 (94) 95   


 


1/3/18 20:03     92   21


 


1/3/18 18:00 97.7 86 17 134/59 (84) 95   


 


1/3/18 10:10     93 Nasal Cannula 2.00 














I/O      


 


 1/3/18 1/3/18 1/3/18 1/4/18 1/4/18 1/4/18





 07:00 15:00 23:00 07:00 15:00 23:00


 


Intake Total 0 ml 720 ml 960 ml 0 ml  


 


Output Total 100 ml  4 ml   


 


Balance -100 ml 720 ml 956 ml 0 ml  


 


      


 


Intake Oral 0 ml 720 ml 960 ml 0 ml  


 


Output Urine Total 100 ml  4 ml   


 


# Voids    1  








Result Diagram:  


1/4/18 0505                                                                    

            1/1/18 0526





Other Results





 Laboratory Tests








Test


  1/2/18


11:28 1/3/18


04:45 1/4/18


05:05


 


White Blood Count 23.5 TH/MM3  21.6 TH/MM3  22.5 TH/MM3 


 


Red Blood Count 6.82 MIL/MM3  6.46 MIL/MM3  6.26 MIL/MM3 


 


Hemoglobin 14.9 GM/DL  14.4 GM/DL  14.3 GM/DL 


 


Hematocrit 46.9 %  44.8 %  43.3 % 


 


Mean Corpuscular Volume 68.9 FL  69.4 FL  69.2 FL 


 


Mean Corpuscular Hemoglobin 21.9 PG  22.3 PG  22.9 PG 


 


Mean Corpuscular Hemoglobin


Concent 31.8 % 


  32.1 % 


  33.1 % 


 


 


Red Cell Distribution Width 19.4 %  19.2 %  19.9 % 


 


Platelet Count 1080 TH/MM3  1038 TH/MM3  1146 TH/MM3 


 


Mean Platelet Volume 8.1 FL  8.5 FL  8.9 FL 


 


Neutrophils (%) (Auto) 88.8 %  88.0 %  86.6 % 


 


Lymphocytes (%) (Auto) 3.9 %  5.0 %  6.0 % 


 


Monocytes (%) (Auto) 4.0 %  4.3 %  4.0 % 


 


Eosinophils (%) (Auto) 2.2 %  2.5 %  2.5 % 


 


Basophils (%) (Auto) 1.1 %  0.2 %  0.9 % 


 


Neutrophils # (Auto) 20.9 TH/MM3  19.0 TH/MM3  19.4 TH/MM3 


 


Lymphocytes # (Auto) 0.9 TH/MM3  1.1 TH/MM3  1.3 TH/MM3 


 


Monocytes # (Auto) 0.9 TH/MM3  0.9 TH/MM3  0.9 TH/MM3 


 


Eosinophils # (Auto) 0.5 TH/MM3  0.5 TH/MM3  0.6 TH/MM3 


 


Basophils # (Auto) 0.3 TH/MM3  0.0 TH/MM3  0.2 TH/MM3 


 


CBC Comment AUTO DIFF  AUTO DIFF  AUTO DIFF 


 


Differential Total Cells


Counted 100 


  100 


  100 


 


 


Neutrophils % (Manual) 77 %  86 %  75 % 


 


Band Neutrophils % 14 %  3 %  16 % 


 


Lymphocytes % 6 %  4 %  5 % 


 


Monocytes % 2 %  6 %  2 % 


 


Neutrophils # (Manual) 21.6 TH/MM3  19.2 TH/MM3  20.7 TH/MM3 


 


Myelocytes 1 %   1 % 


 


Differential Comment


  FINAL DIFF


MANUAL FINAL DIFF


MANUAL FINAL DIFF


MANUAL


 


Platelet Estimate HIGH  HIGH  HIGH 


 


Platelet Morphology Comment ENLARGED  ENLARGED  NORMAL 


 


Ovalocytes 1+  1+  


 


Erythrocyte Sedimentation Rate 1 mm/hr   


 


C-Reactive Protein 8.93 MG/DL   


 


Basophils %  1 %  


 


Eosinophils %   1 % 


 


Toxic Vacuolation   PRESENT 








Imaging





Last Impressions








Lower Extremity CT 1/3/18 1226 Signed





Impressions: 





 Service Date/Time:  Wednesday, January 3, 2018 15:52 - CONCLUSION:  Minimal 

deep 





 hematoma distal anastomosis measuring 3.2 cm x 1.7 cm.     Froilan Salazar MD

  





 FACR


 


Liver Ultrasound 1/2/18 0000 Signed





Impressions: 





 Service Date/Time:  Tuesday, January 2, 2018 20:54 - CONCLUSION:  1. Echogenic 





 right kidney which can be seen with medical renal disease.  2. Minimal 





 nonspecific perinephric fluid. 3. Spleen is at the upper limits of normal in 





 size. 4. No evidence for cholelithiasis.     Johnson Salinas MD 


 


Chest X-Ray 1/1/18 0600 Signed





Impressions: 





 Service Date/Time:  Monday, January 1, 2018 03:17 - CONCLUSION:  New right 

lower 





 lobe infiltrate. Stable interstitial changes.     Mike Dixon Jr., MD 








Objective Remarks


GENERAL: AWAKE ALERT AND SOMEWHAT ORIENTED TO PERSON AND PLACE- FOLLOWS 

COMMANDS FOR ME


SKIN: Warm and dry.


HEAD: Atraumatic. Normocephalic. 


EYES: Pupils equal and round. No scleral icterus. No injection or drainage. EOMI


ENT: No nasal bleeding or discharge.  Mucous membranes pink and moist. TONGUE 

MIDLINE


NECK: Trachea midline. No JVD. SUPPLE 


CARDIOVASCULAR: IRRegular rate and rhythm.  S1, S2 NO S3 OR S4


RESPIRATORY: No accessory muscle use. Clear to auscultation. Breath sounds 

equal bilaterally. 


GASTROINTESTINAL: Abdomen soft, non-tender, nondistended. Hepatic and splenic 

margins not palpable. 


MUSCULOSKELETAL: Extremities without clubbing, cyanosis, or edema. No obvious 

deformities. 


NEUROLOGICAL: Awake and alert. No obvious cranial nerve deficits.  Motor 

grossly within normal limits. 4 out of 5 muscle strength in the arms and legs.  

Normal speech.


PSYCHIATRIC: INAppropriate mood and affect; insight and judgment ABnormal.


Procedures


NONE


Medications and IVs





Current Medications


Amoxicillin/ Clavulanate Potassium (Augmentin) 500 mg BID PO ;  Start 12/28/17 

at 21:00;  Stop 12/29/17 at 10:31;  Status DC


Atorvastatin Calcium (Lipitor) 80 mg HS PO  Last administered on 1/3/18at 20:46

;  Start 12/28/17 at 21:00


Clopidogrel Bisulfate (Plavix) 75 mg DAILY PO  Last administered on 1/3/18at 09:

00;  Start 12/28/17 at 17:30;  Stop 1/3/18 at 14:12;  Status DC


Digoxin (Lanoxin) 0.125 mg DAILY PO  Last administered on 1/3/18at 09:00;  

Start 12/28/17 at 17:30


Metoprolol Tartrate (Lopressor) 25 mg Q12HR PO  Last administered on 1/3/18at 20

:47;  Start 12/28/17 at 21:00


Quetiapine Fumarate (SEROquel) 12.5 mg BID@09,12 PO ;  Start 12/29/17 at 09:00;

  Stop 1/1/18 at 10:24;  Status DC


Acetaminophen (Tylenol) 650 mg Q4H  PRN PO Pain 1-5 or Temp >101F Last 

administered on 1/1/18at 09:17;  Start 12/28/17 at 17:30


Magnesium Hydroxide (Milk Of Magnesia Liq) 30 ml DAILY  PRN PO CONSTIPATION;  

Start 12/28/17 at 17:30


Al Hydrox/Mg Hydrox/Simethicone (Mag-Al Plus Susp Liq) 30 ml Q6H  PRN PO 

DYSPEPSIA;  Start 12/28/17 at 17:30


Hydroxyzine HCl (Atarax) 50 mg Q6H  PRN PO ANXIETY Last administered on 1/2/ 18at 21:17;  Start 12/29/17 at 09:15


Fluconazole (Diflucan) 150 mg ONCE  ONCE PO  Last administered on 12/29/17at 11:

01;  Start 12/29/17 at 11:00;  Stop 12/29/17 at 11:01;  Status DC


Ceftriaxone Sodium 1000 mg/ Sodium Chloride 100 ml @  200 mls/hr Q24H IV  Last 

administered on 1/3/18at 11:00;  Start 12/29/17 at 11:00


Miscellaneous (Pill Splitter) 1 ea UNSCH  PRN OTHER SEE LABEL COMMENTS;  Start 

12/29/17 at 10:30


Metoclopramide HCl (Reglan) 10 mg ACHS  PRN PO Digestive Aid;  Start 12/29/17 

at 13:30;  Status Future Hold


Ondansetron HCl (Zofran  Odt) 4 mg Q6H  PRN PO nausea;  Start 12/29/17 at 13:30

;  Status Future Hold


Albuterol/ Ipratropium (Duoneb Neb) 1 ampule Q4HR NEB  PRN NEB wheezing Last 

administered on 1/3/18at 20:03;  Start 12/29/17 at 13:45


Methylprednisolone Sodium Succinate (SoluMEDROL INJ) 125 mg ONCE  ONCE IV PUSH  

Last administered on 12/29/17at 13:57;  Start 12/29/17 at 14:00;  Stop 12/29/17 

at 14:01;  Status DC


Furosemide (Lasix Inj) 40 mg ONCE  ONCE IV PUSH  Last administered on 12/29/ 17at 14:33;  Start 12/29/17 at 14:15;  Stop 12/29/17 at 14:16;  Status DC


Clonidine (Catapres) 0.1 mg Q6H  PRN PO SBP > 160 Last administered on 12/29/ 17at 18:14;  Start 12/29/17 at 17:30


Furosemide (Lasix Inj) 20 mg DAILY IV PUSH  Last administered on 12/30/17at 09:

00;  Start 12/30/17 at 09:00;  Stop 12/31/17 at 09:43;  Status DC


Potassium Chloride (KCl) 10 meq DAILY PO  Last administered on 12/30/17at 09:00

;  Start 12/30/17 at 09:00;  Stop 12/31/17 at 09:43;  Status DC


Albuterol/ Ipratropium (Duoneb Neb) 1 ampule Q6HR WHILE AWAKE  NEB NEB  Last 

administered on 1/3/18at 13:55;  Start 12/30/17 at 20:00;  Stop 1/3/18 at 19:59

;  Status DC


Azithromycin (Zithromax) 250 mg DAILY PO ;  Start 12/31/17 at 09:00;  Stop 12/31 /17 at 09:00;  Status DC


Azithromycin (Zithromax) 500 mg ONCE  ONCE PO ;  Start 12/30/17 at 15:15;  Stop 

12/30/17 at 15:15;  Status DC


Furosemide (Lasix Inj) 20 mg DAILY IV PUSH  Last administered on 1/2/18at 09:00

;  Start 12/30/17 at 15:15;  Stop 1/2/18 at 15:14;  Status DC


Potassium Chloride (KCl) 10 meq DAILY PO  Last administered on 1/2/18at 11:56;  

Start 12/30/17 at 15:15;  Stop 1/2/18 at 15:14;  Status DC


Amoxicillin/ Clavulanate Potassium (Augmentin) 500 mg Q12HR PO  Last 

administered on 12/30/17at 21:43;  Start 12/30/17 at 21:00;  Stop 12/31/17 at 08

:06;  Status DC


Fluconazole (Diflucan) 200 mg DAILY PO  Last administered on 1/2/18at 11:56;  

Start 12/31/17 at 09:00;  Stop 1/3/18 at 08:59;  Status DC


Quetiapine Fumarate (SEROquel) 25 mg BID@09,12 PO ;  Start 1/1/18 at 12:00;  

Stop 1/2/18 at 10:16;  Status DC


Quetiapine Fumarate (SEROquel) 25 mg TID@0800,1400,2000 PO  Last administered 

on 1/2/18at 21:17;  Start 1/2/18 at 14:00


Enoxaparin Sodium (Lovenox Inj) 30 mg Q24H SQ  Last administered on 1/3/18at 09:

00;  Start 1/3/18 at 09:00;  Stop 1/3/18 at 14:12;  Status DC


Aspirin (Aspirin Chew) 81 mg DAILY CHEW ;  Start 1/4/18 at 09:00


Apixaban (Eliquis) 5 mg BID PO  Last administered on 1/3/18at 20:46;  Start 1/3/

18 at 21:00





A/P


Problem List:  


(1) History of endarterectomy


ICD Code:  Z98.890 - Other specified postprocedural states


(2) Dyspnea


ICD Code:  R06.00 - Dyspnea, unspecified


(3) Pain


ICD Code:  R52 - Pain, unspecified


(4) Thrombocytosis


ICD Code:  D47.3 - Essential (hemorrhagic) thrombocythemia


Status:  Chronic


(5) UTI (urinary tract infection)


ICD Code:  N39.0 - Urinary tract infection, site not specified


(6) Peripheral vascular disease


ICD Code:  I73.9 - Peripheral vascular disease, unspecified


Status:  Acute


Assessment and Plan


Assessment and Plan


77 years old female who was admitted to medical service from December 17, 2017 

to December 28, 2017.


She was found to have severe PAD requiring right femoropopliteal bypass and 

femoral endarterectomy during that hospitalization.


Course was complicated by UTI and pneumonia.


Patient reports no primary care doctor in no follow-up with no medications 

prior to this hospitalization.  Nursing staff reported patient has no family 

members that they were able to reach out to as well.





Impression/plan:








Suprapubic tenderness on 1/1/18


bladder scan showing >900cc


munoz inserted 


likely from not ambulating


will dc munoz today 1/3/18 to give her trial of void- if retain again, then 

will dc to Rehab/ NH with munoz - fu with urology as outpatient








Dementia.   management per psychiatry.


UTI.  Candida growing in cultures.  On Diflucan.


Pneumonia.   Asymptomatic at present.  On Rocephin started 12/29/17. Will dc 

tomorrow 1/4/18 to complete 7 days course. 





Leukocytosis/thrombocytosis/erythrocytosis


Patient denies any prior history of this.  Previous admission in early December 

reveals was erythrocytosis in a smoker.


Patient has history of CVA, severe PAD, A. fib.  High risk for clot formations.


hematology was consulted, recommendations reviewed- will fu workup- need 

outpatient hematology follow up 


CONTINUE ON ELIQUIS 5MG PO BID








S/P Fem/Pop Bypass


   - 12/22/17 exploration of the left groin, left common femoral and external 

iliac artery. Endarterectomy and patch angioplasty. External and common artery 

thromboembolectomy, superficial femoral popliteal artery thromboembolectomy, 

arteriogram.


   - 12/26/17 Right external iliac-common femoral endarterectomy and patch 

angioplasty and right femoral-popliteal bypass, PTFE graft


- pt seems to have more pain on exam today- nursing order written to f/u with 

vascular sx on discharge planning care and to report of pt's symptoms- will 

also obtain CT RLE to make sure no hematoma/ post op complication


CONTINUE ON ELIQUIS 5MG PO BID





Atrial Fibrillation.  Rate controlled.  Anticoagulation needs to be discussed.  

However per nursing staff, patient is a very high risk for falls and she did in 

fact fell while in hospital on December 29, 2017.  Currently patient is on 

Plavix for severe PAD.


Based on her discharge planning and risk of falls, we will reconsider 

anticoagulation.


At present high risk of falls due to pt's prolonged hospitalization/ recent 

surgery and not ambulating. However, discussed with psychiatry team- pt is 

planned for dc to Rehab or NH- thus would be able to anticoagulate under 

monitored setting- I do think that pt is a candidate for anticoagulation. 


Was seen by cardiology during this hospitalization.  Due to abnormal EKG prior 

to the OR by vascular surgeon for femoropopliteal, cardiology clearance was 

obtained.


CARDIOLOGY WANTS ON ANTICOAGULATION


CONTINUE ON ELIQUIS 5MG PO BID








HTN - controlled


Hyperlipidemia


   - Abnormal EKG in inpatient with abnormal stress test and subsequent cardia 

cath was done?


   - Continue with ELIQUIS 5MG BID, metoprolol 25 mg by mouth every 12 hours, 

atorvastatin 80 mg daily at bedtime, digoxin 0.125 mg daily ASA 81MG PO DAILY








DVT prop - on ELIQUIS 5MG BID


Discharge Planning


PENDING PSYCHIATRY PLACEMENT











Froilan Marrufo DO Jan 4, 2018 09:16

## 2018-01-04 NOTE — HHI.PYPN
Subjective


Chief Complaint:  patient confused unable to care for self found lying in her 

own waste produ


Remarks


Patient seen in her room with nurse raymond chart reviewed, patient compliant 

medication.  Patient laying flat on her back in bed covers to her chin markedly 

psychomotor retarded while appearing melancholic patient does respond well 

verbally if somewhat slowly.  Though it is goal oriented.  She denies 

suicidality denies voices or visions.  However she is very passive complacent 

with no behavioral issues.  I think there is a significant underlying 

depression.  Will add Lexapro 10 mg daily to her regimen.  I think we need to 

be more aggressive looking at placement other with palliative care perhaps home 

though we've had little success reaching any family members.





Review of Systems


Except as stated in HPI:  all other systems reviewed are Neg





Mental Status Examination


Appearance:  Appropriate


Consciousness:  Alert


Orientation:  Person, Place, Situation (knows why she is in the hospital)


Motor Activity:  Other (patient bedridden at this time)


Speech:  Hesitant


Language:  Adequate


Fund of Knowledge:  Inadequate


Attention and Concentration:  Other (poor)


Memory:  Impaired


Mood:  Other (euthymic to somewhat restricted)


Affect:  Other (decreased range and intensity)


Thought Process & Associations:  Linear


Thought Content:  Other (disorganized)


Hallucination Type:  None (denies)


Delusion Type:  None


Suicidal Ideation:  No


Suicidal Plan:  No


Suicidal Intention:  No


Homicidal Ideation:  No


Homicidal Plan:  No


Homicidal Intention:  No


Insight:  Poor


Judgment:  Poor





Results


Labs











Test


  1/4/18


05:05


 


White Blood Count 22.5 TH/MM3 


 


Red Blood Count 6.26 MIL/MM3 


 


Hemoglobin 14.3 GM/DL 


 


Hematocrit 43.3 % 


 


Mean Corpuscular Volume 69.2 FL 


 


Mean Corpuscular Hemoglobin 22.9 PG 


 


Mean Corpuscular Hemoglobin


Concent 33.1 % 


 


 


Red Cell Distribution Width 19.9 % 


 


Platelet Count 1146 TH/MM3 


 


Mean Platelet Volume 8.9 FL 


 


Neutrophils (%) (Auto) 86.6 % 


 


Lymphocytes (%) (Auto) 6.0 % 


 


Monocytes (%) (Auto) 4.0 % 


 


Eosinophils (%) (Auto) 2.5 % 


 


Basophils (%) (Auto) 0.9 % 


 


Neutrophils # (Auto) 19.4 TH/MM3 


 


Lymphocytes # (Auto) 1.3 TH/MM3 


 


Monocytes # (Auto) 0.9 TH/MM3 


 


Eosinophils # (Auto) 0.6 TH/MM3 


 


Basophils # (Auto) 0.2 TH/MM3 


 


CBC Comment AUTO DIFF 


 


Differential Total Cells


Counted 100 


 


 


Neutrophils % (Manual) 75 % 


 


Band Neutrophils % 16 % 


 


Lymphocytes % 5 % 


 


Monocytes % 2 % 


 


Eosinophils % 1 % 


 


Neutrophils # (Manual) 20.7 TH/MM3 


 


Myelocytes 1 % 


 


Differential Comment


  FINAL DIFF


MANUAL


 


Toxic Vacuolation PRESENT 


 


Platelet Estimate HIGH 


 


Platelet Morphology Comment NORMAL 














 Date/Time


Source Procedure


Growth Status


 


 


 12/29/17 11:50


Urine Other Urine Culture - Final


Candida Albicans Complete








Vitals/IOs





Vital Signs








  Date Time  Temp Pulse Resp B/P (MAP) Pulse Ox O2 Delivery O2 Flow Rate FiO2


 


1/4/18 08:33     95   21


 


1/4/18 06:13 98.4 82 18 144/70 (94)    


 


1/3/18 10:10      Nasal Cannula 2.00 














Intake and Output   


 


 1/4/18 1/4/18 1/5/18





 08:00 16:00 00:00


 


Intake Total 0 ml 300 ml 


 


Balance 0 ml 300 ml 











Assessment & Plan


Problem List:  


(1) OTHER ALZHEIMER'S DISEASE


ICD Codes:  G30.8 - OTHER ALZHEIMER'S DISEASE


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 

disturbance


ICD Codes:  F02.80 - Dementia in other diseases classified elsewhere without 

behavioral disturbance


Assessment & Plan


Estimated LOS:  days patient remains psychomotor retarded docile with a 

somewhat sad face.  Will add Lexapro 10 mg daily to the regimen.  Needlework 

perhaps with palliative care counselors to find an appropriate placement for 

this lady


Justification for Cont. Inpt.


At this time patient decompensate if placed a lower level of care


Discharge Planning


Need to work with palliative care per counselors and if possible family members 

for come to an appropriate discharge plan


Request HC Surrog/Guard Advoc?:  Yes











Emir Shultz MD Jan 4, 2018 15:44

## 2018-01-05 VITALS
RESPIRATION RATE: 18 BRPM | OXYGEN SATURATION: 94 % | SYSTOLIC BLOOD PRESSURE: 145 MMHG | DIASTOLIC BLOOD PRESSURE: 70 MMHG | TEMPERATURE: 98.1 F | HEART RATE: 80 BPM

## 2018-01-05 VITALS
DIASTOLIC BLOOD PRESSURE: 75 MMHG | HEART RATE: 82 BPM | OXYGEN SATURATION: 94 % | TEMPERATURE: 97.8 F | SYSTOLIC BLOOD PRESSURE: 137 MMHG | RESPIRATION RATE: 17 BRPM

## 2018-01-05 LAB
ALBUMIN SERPL-MCNC: 2.1 GM/DL (ref 3.4–5)
ALP SERPL-CCNC: 89 U/L (ref 45–117)
ALT SERPL-CCNC: 13 U/L (ref 10–53)
AST SERPL-CCNC: 26 U/L (ref 15–37)
BASOPHILS # BLD AUTO: 0.1 TH/MM3 (ref 0–0.2)
BASOPHILS NFR BLD: 0.5 % (ref 0–2)
BILIRUB SERPL-MCNC: 0.4 MG/DL (ref 0.2–1)
BUN SERPL-MCNC: 8 MG/DL (ref 7–18)
CALCIUM SERPL-MCNC: 8.3 MG/DL (ref 8.5–10.1)
CHLORIDE SERPL-SCNC: 101 MEQ/L (ref 98–107)
CREAT SERPL-MCNC: 0.45 MG/DL (ref 0.5–1)
EOSINOPHIL # BLD: 0.5 TH/MM3 (ref 0–0.4)
EOSINOPHIL NFR BLD: 2.4 % (ref 0–4)
ERYTHROCYTE [DISTWIDTH] IN BLOOD BY AUTOMATED COUNT: 19.5 % (ref 11.6–17.2)
GFR SERPLBLD BASED ON 1.73 SQ M-ARVRAT: 135 ML/MIN (ref 89–?)
GLUCOSE SERPL-MCNC: 87 MG/DL (ref 74–106)
HCO3 BLD-SCNC: 25.3 MEQ/L (ref 21–32)
HCT VFR BLD CALC: 46.6 % (ref 35–46)
HGB BLD-MCNC: 14.7 GM/DL (ref 11.6–15.3)
LYMPHOCYTES # BLD AUTO: 0.9 TH/MM3 (ref 1–4.8)
LYMPHOCYTES NFR BLD AUTO: 4.2 % (ref 9–44)
LYMPHOCYTES: 6 % (ref 9–44)
MAGNESIUM SERPL-MCNC: 2.1 MG/DL (ref 1.5–2.5)
MCH RBC QN AUTO: 21.9 PG (ref 27–34)
MCHC RBC AUTO-ENTMCNC: 31.6 % (ref 32–36)
MCV RBC AUTO: 69.2 FL (ref 80–100)
METAMYELOCYTES NFR BLD: 1 % (ref 0–1)
MONOCYTE #: 0.9 TH/MM3 (ref 0–0.9)
MONOCYTES NFR BLD: 4 % (ref 0–8)
MONOCYTES: 8 % (ref 0–8)
MYELOCYTES NFR BLD: 2 % (ref 0–0)
NEUTROPHILS # BLD AUTO: 19 TH/MM3 (ref 1.8–7.7)
NEUTROPHILS NFR BLD AUTO: 88.9 % (ref 16–70)
NEUTS BAND # BLD MANUAL: 18.4 TH/MM3 (ref 1.8–7.7)
NEUTS BAND NFR BLD: 11 % (ref 0–6)
NEUTS SEG NFR BLD MANUAL: 72 % (ref 16–70)
OVALOCYTES BLD QL SMEAR: (no result)
PHOSPHATE SERPL-MCNC: 2.6 MG/DL (ref 2.5–4.9)
PLATELET # BLD: 1035 TH/MM3 (ref 150–450)
PMV BLD AUTO: 8.5 FL (ref 7–11)
PROT SERPL-MCNC: 5.7 GM/DL (ref 6.4–8.2)
RBC # BLD AUTO: 6.74 MIL/MM3 (ref 4–5.3)
SODIUM SERPL-SCNC: 138 MEQ/L (ref 136–145)
WBC # BLD AUTO: 21.4 TH/MM3 (ref 4–11)

## 2018-01-05 RX ADMIN — METOPROLOL TARTRATE SCH MG: 25 TABLET, FILM COATED ORAL at 09:39

## 2018-01-05 RX ADMIN — ATORVASTATIN CALCIUM SCH MG: 80 TABLET, FILM COATED ORAL at 21:25

## 2018-01-05 RX ADMIN — DIGOXIN SCH MG: 125 TABLET ORAL at 09:40

## 2018-01-05 RX ADMIN — APIXABAN SCH MG: 5 TABLET, FILM COATED ORAL at 09:40

## 2018-01-05 RX ADMIN — SODIUM CHLORIDE SCH MLS/HR: 900 INJECTION INTRAVENOUS at 11:00

## 2018-01-05 RX ADMIN — ESCITALOPRAM OXALATE SCH MG: 10 TABLET, FILM COATED ORAL at 09:00

## 2018-01-05 RX ADMIN — HYDROXYZINE HYDROCHLORIDE PRN MG: 50 TABLET, FILM COATED ORAL at 21:25

## 2018-01-05 RX ADMIN — ASPIRIN 81 MG SCH MG: 81 TABLET ORAL at 09:40

## 2018-01-05 RX ADMIN — APIXABAN SCH MG: 5 TABLET, FILM COATED ORAL at 21:25

## 2018-01-05 RX ADMIN — FLUCONAZOLE SCH MG: 200 TABLET ORAL at 10:15

## 2018-01-05 RX ADMIN — METOPROLOL TARTRATE SCH MG: 25 TABLET, FILM COATED ORAL at 21:25

## 2018-01-05 NOTE — HHI.PYPN
Subjective


Chief Complaint:  patient confused unable to care for self found lying in her 

own waste produ


Remarks


Patient seen in her room with counselor Radha.  Chart reviewed.  Patient 

compliant medication.  Patient continues no behavioral problem she is calm 

pleasant with me with fair eye contact occasional small smile today.  She is 

more verbal today.  She is well oriented.  Attempted to discuss with patient 

also discharge plans.  There is a note from 1/1/18 from palliative care 

referral to a SNF.  I agree with that.  The liver counselor start working on 

that issue.  The patient is involuntary at this time I feel that the Meyer act 

maybe lifted if appropriate placement is found





Review of Systems


Except as stated in HPI:  all other systems reviewed are Neg





Mental Status Examination


Appearance:  Appropriate


Consciousness:  Alert


Orientation:  Person, Place, Situation (knows why she is in the hospital)


Motor Activity:  Other (patient bedridden at this time)


Speech:  Hesitant


Language:  Adequate


Fund of Knowledge:  Inadequate


Attention and Concentration:  Other (poor)


Memory:  Impaired


Mood:  Other (euthymic to somewhat restricted)


Affect:  Other (decreased range and intensity)


Thought Process & Associations:  Linear


Thought Content:  Other (disorganized)


Hallucination Type:  None (denies)


Delusion Type:  None


Suicidal Ideation:  No


Suicidal Plan:  No


Suicidal Intention:  No


Homicidal Ideation:  No


Homicidal Plan:  No


Homicidal Intention:  No


Insight:  Poor


Judgment:  Poor





Results


Labs











Test


  1/5/18


07:35


 


White Blood Count 21.4 TH/MM3 


 


Red Blood Count 6.74 MIL/MM3 


 


Hemoglobin 14.7 GM/DL 


 


Hematocrit 46.6 % 


 


Mean Corpuscular Volume 69.2 FL 


 


Mean Corpuscular Hemoglobin 21.9 PG 


 


Mean Corpuscular Hemoglobin


Concent 31.6 % 


 


 


Red Cell Distribution Width 19.5 % 


 


Platelet Count 1035 TH/MM3 


 


Mean Platelet Volume 8.5 FL 


 


Neutrophils (%) (Auto) 88.9 % 


 


Lymphocytes (%) (Auto) 4.2 % 


 


Monocytes (%) (Auto) 4.0 % 


 


Eosinophils (%) (Auto) 2.4 % 


 


Basophils (%) (Auto) 0.5 % 


 


Neutrophils # (Auto) 19.0 TH/MM3 


 


Lymphocytes # (Auto) 0.9 TH/MM3 


 


Monocytes # (Auto) 0.9 TH/MM3 


 


Eosinophils # (Auto) 0.5 TH/MM3 


 


Basophils # (Auto) 0.1 TH/MM3 


 


CBC Comment AUTO DIFF 


 


Blood Urea Nitrogen 8 MG/DL 


 


Creatinine 0.45 MG/DL 


 


Random Glucose 87 MG/DL 


 


Total Protein 5.7 GM/DL 


 


Albumin 2.1 GM/DL 


 


Calcium Level 8.3 MG/DL 


 


Phosphorus Level 2.6 MG/DL 


 


Magnesium Level 2.1 MG/DL 


 


Alkaline Phosphatase 89 U/L 


 


Aspartate Amino Transf


(AST/SGOT) 26 U/L 


 


 


Alanine Aminotransferase


(ALT/SGPT) 13 U/L 


 


 


Total Bilirubin 0.4 MG/DL 


 


Sodium Level 138 MEQ/L 


 


Potassium Level 3.6 MEQ/L 


 


Chloride Level 101 MEQ/L 


 


Carbon Dioxide Level 25.3 MEQ/L 


 


Anion Gap 12 MEQ/L 


 


Estimat Glomerular Filtration


Rate 135 ML/MIN 


 














 Date/Time


Source Procedure


Growth Status


 


 


 12/29/17 11:50


Urine Other Urine Culture - Final


Candida Albicans Complete








Vitals/IOs





Vital Signs








  Date Time  Temp Pulse Resp B/P (MAP) Pulse Ox O2 Delivery O2 Flow Rate FiO2


 


1/5/18 06:08 98.1 80 18 145/70 (95) 94   


 


1/4/18 08:33        21


 


1/3/18 10:10      Nasal Cannula 2.00 














Intake and Output   


 


 1/5/18 1/5/18 1/6/18





 08:00 16:00 00:00


 


Intake Total 240 ml  


 


Balance 240 ml  











Assessment & Plan


Problem List:  


(1) OTHER ALZHEIMER'S DISEASE


ICD Codes:  G30.8 - OTHER ALZHEIMER'S DISEASE


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 

disturbance


ICD Codes:  F02.80 - Dementia in other diseases classified elsewhere without 

behavioral disturbance


Assessment & Plan


Estimated LOS:  days patient showing some increased focus and processing.  She 

is pleasant denying suicidality voices or visions.  There have the counselor 

continue to work on finding an appropriate SNF for this lady


Justification for Cont. Inpt.


If this time patient decompensate if not placed at an appropriate level of care


Discharge Planning


We need to work with medicine and palliative care to finding appropriate SNF


Request HC Surrog/Guard Advoc?:  Yes











Emir Shultz MD Jan 5, 2018 09:28

## 2018-01-05 NOTE — PD.ONC.PN
Subjective


Subjective


Remarks


Afebrile overnight. 


Patient resting in bed in nad. 


No complaints. 


Watching TV.





Objective


Data











  Date Time  Temp Pulse Resp B/P (MAP) Pulse Ox O2 Delivery O2 Flow Rate FiO2


 


1/5/18 06:08 98.1 80 18 145/70 (95) 94   


 


1/4/18 17:39 98.9 77 15 129/61 (83) 94   














 1/5/18 1/5/18 1/5/18





 07:00 15:00 23:00


 


Intake Total 0 ml 240 ml 


 


Balance 0 ml 240 ml 








Result Diagram:  


1/5/18 0735                                                                    

            1/5/18 0735





Laboratory Results





Laboratory Tests








Test


  1/5/18


07:35


 


White Blood Count 21.4 TH/MM3 


 


Red Blood Count 6.74 MIL/MM3 


 


Hemoglobin 14.7 GM/DL 


 


Hematocrit 46.6 % 


 


Mean Corpuscular Volume 69.2 FL 


 


Mean Corpuscular Hemoglobin 21.9 PG 


 


Mean Corpuscular Hemoglobin


Concent 31.6 % 


 


 


Red Cell Distribution Width 19.5 % 


 


Platelet Count 1035 TH/MM3 


 


Mean Platelet Volume 8.5 FL 


 


Neutrophils (%) (Auto) 88.9 % 


 


Lymphocytes (%) (Auto) 4.2 % 


 


Monocytes (%) (Auto) 4.0 % 


 


Eosinophils (%) (Auto) 2.4 % 


 


Basophils (%) (Auto) 0.5 % 


 


Neutrophils # (Auto) 19.0 TH/MM3 


 


Lymphocytes # (Auto) 0.9 TH/MM3 


 


Monocytes # (Auto) 0.9 TH/MM3 


 


Eosinophils # (Auto) 0.5 TH/MM3 


 


Basophils # (Auto) 0.1 TH/MM3 


 


CBC Comment AUTO DIFF 


 


Differential Total Cells


Counted 100 


 


 


Neutrophils % (Manual) 72 % 


 


Band Neutrophils % 11 % 


 


Lymphocytes % 6 % 


 


Monocytes % 8 % 


 


Neutrophils # (Manual) 18.4 TH/MM3 


 


Metamyelocytes 1 % 


 


Myelocytes 2 % 


 


Differential Comment


  FINAL DIFF


MANUAL


 


Platelet Estimate HIGH 


 


Platelet Morphology Comment NORMAL 


 


Ovalocytes 1+ 


 


Blood Urea Nitrogen 8 MG/DL 


 


Creatinine 0.45 MG/DL 


 


Random Glucose 87 MG/DL 


 


Total Protein 5.7 GM/DL 


 


Albumin 2.1 GM/DL 


 


Calcium Level 8.3 MG/DL 


 


Phosphorus Level 2.6 MG/DL 


 


Magnesium Level 2.1 MG/DL 


 


Alkaline Phosphatase 89 U/L 


 


Aspartate Amino Transf


(AST/SGOT) 26 U/L 


 


 


Alanine Aminotransferase


(ALT/SGPT) 13 U/L 


 


 


Total Bilirubin 0.4 MG/DL 


 


Sodium Level 138 MEQ/L 


 


Potassium Level 3.6 MEQ/L 


 


Chloride Level 101 MEQ/L 


 


Carbon Dioxide Level 25.3 MEQ/L 


 


Anion Gap 12 MEQ/L 


 


Estimat Glomerular Filtration


Rate 135 ML/MIN 


 











Administered Medications








 Medications


  (Trade)  Dose


 Ordered  Sig/Carlitos


 Route


 PRN Reason  Start Time


 Stop Time Status Last Admin


Dose Admin


 


 Atorvastatin


 Calcium


  (Lipitor)  80 mg  HS


 PO


   12/28/17 21:00


    1/4/18 21:11


 


 


 Digoxin


  (Lanoxin)  0.125 mg  DAILY


 PO


   12/28/17 17:30


    1/5/18 09:40


 


 


 Metoprolol


 Tartrate


  (Lopressor)  25 mg  Q12HR


 PO


   12/28/17 21:00


    1/5/18 09:39


 


 


 Acetaminophen


  (Tylenol)  650 mg  Q4H  PRN


 PO


 Pain 1-5 or Temp >101F  12/28/17 17:30


    1/1/18 09:17


 


 


 Hydroxyzine HCl


  (Atarax)  50 mg  Q6H  PRN


 PO


 ANXIETY  12/29/17 09:15


    1/4/18 21:10


 


 


 Ceftriaxone


 Sodium 1000 mg/


 Sodium Chloride  100 ml @ 


 200 mls/hr  Q24H


 IV


   12/29/17 11:00


    1/5/18 11:00


 


 


 Albuterol/


 Ipratropium


  (Duoneb Neb)  1 ampule  Q4HR NEB  PRN


 NEB


 wheezing  12/29/17 13:45


    1/3/18 20:03


 


 


 Clonidine


  (Catapres)  0.1 mg  Q6H  PRN


 PO


 SBP > 160  12/29/17 17:30


    12/29/17 18:14


 


 


 Quetiapine


 Fumarate


  (SEROquel)  25 mg  TID@0800,1400,2000


 PO


   1/2/18 14:00


    1/5/18 14:00


 


 


 Aspirin


  (Aspirin Chew)  81 mg  DAILY


 CHEW


   1/4/18 09:00


    1/5/18 09:40


 


 


 Apixaban


  (Eliquis)  5 mg  BID


 PO


   1/3/18 21:00


    1/5/18 09:40


 


 


 Escitalopram


 Oxalate


  (Lexapro)  10 mg  DAILY


 PO


   1/4/18 15:45


    1/5/18 09:00


 








Objective Remarks


GENERAL: Frail elderly female, supine in bed watching TV


SKIN: Warm and dry. no rash.


HEAD: Normocephalic.


EYES: No injection or drainage. 


NECK: Supple, trachea midline. 


CARDIOVASCULAR: +S1/S2


RESPIRATORY: anterior fields clear. 


GASTROINTESTINAL: Abdomen soft, non-tender, nondistended. 


EXTREMITIES: No cyanosis, or edema. 


MUSCULOSKELETAL: Adequate muscle tone.


NEUROLOGICAL: awake. following commands. answering questions appropriately.





Assessment/Plan


Problem List:  


(1) Leukocytosis


ICD Codes:  D72.829 - Elevated white blood cell count, unspecified


Plan:  --likely reactive d/t recent major surgical procedures including 

endarterectomy and bypass surgery as well as urinary tract infection and 

pneumonia. 


--b12/folate WNL


--iron studies show low TIBC, serum iron % saturation and ferritin, usually 

with iron deficiency anemia TIBC is normal or elevated--may indicate a mixed 

picture. 


--Jak2, BCR/ABL pending





(2) Thrombocytosis


ICD Codes:  D47.3 - Essential (hemorrhagic) thrombocythemia


Status:  Chronic


Plan:  --likely reactive d/t recent major surgical procedures including 

endarterectomy and bypass surgery as well as urinary tract infection and 

pneumonia. 


--b12/folate WNL


--iron studies show low TIBC, serum iron % saturation and ferritin, usually 

with iron deficiency anemia TIBC is normal or elevated--may indicate a mixed 

picture. 


--Jak2, BCR/ABL pending. 





Assessment


77-year-old lady admitted in December 2017 with severe peripheral arterial 

disease requiring right femoropopliteal bypass and femoral endarterectomy.  

Postoperative course was complicated by urinary tract infection and pneumonia. 

  Hematology consulted for leukocytosis and thrombocytosis.


h/o Hyperlipidemia, Hypertension


Plan


1. monitor CBC


2. await JAK2, BCRABL


3. ok to d/c and follow up in oncology clinic for results of above.


Attending Statement


The exam, history, and the medical decision-making described in the above note 

were completed with the assistance of the mid-level provider. I reviewed and 

agree with the findings presented.  I attest that I had a face-to-face 

encounter with the patient on the same day, and personally performed and 

documented my assessment and findings in the medical record. 77 yoF with severe 

PAD s/p vascular surgery and recent UTI/pneumonia.  Following for leukocytosis 

and thrombocytosis.  Likely reactive due to recent surgical procedure and 

infection.  Abdominal ultrasound WNL.  Previously elevated during past hospital 

stay. Will follow up BCRABL and JAK2.











Laila Belcher Jan 5, 2018 16:22


Esthela Smith MD Jan 5, 2018 19:19

## 2018-01-05 NOTE — HHI.PR
Subjective


Remarks


1-4 CARDIOLOGY AND VASCULAR BOTH AGREE THAT PATIENT NEEDS TO BE ON 

ANTICOAGULATION WITH ELIQUIS 5MG PO BID


DW RN AND PT


NO NEW COMPLAINTS AT THIS TIME





1-5 no new complaints today started on Eliquis yesterday


We'll monitor for any other issues


Discussed with patient and RN





Objective


Vitals





Vital Signs








  Date Time  Temp Pulse Resp B/P (MAP) Pulse Ox O2 Delivery O2 Flow Rate FiO2


 


1/5/18 06:08 98.1 80 18 145/70 (95) 94   


 


1/4/18 17:39 98.9 77 15 129/61 (83) 94   














I/O      


 


 1/4/18 1/4/18 1/4/18 1/5/18 1/5/18 1/5/18





 07:00 15:00 23:00 07:00 15:00 23:00


 


Intake Total 0 ml 300 ml 300 ml 0 ml 240 ml 


 


Output Total   700 ml   


 


Balance 0 ml 300 ml -400 ml 0 ml 240 ml 


 


      


 


Intake Oral 0 ml 300 ml 300 ml 0 ml 240 ml 


 


Output Urine Total   700 ml   


 


Bladder Scan Volume Amount   800 ml   


 


# Voids 1  1 0  








Result Diagram:  


1/5/18 0735                                                                    

            1/5/18 0735





Other Results





 Laboratory Tests








Test


  1/2/18


11:28 1/3/18


04:45 1/4/18


05:05 1/5/18


07:35


 


White Blood Count 23.5 TH/MM3  21.6 TH/MM3  22.5 TH/MM3  21.4 TH/MM3 


 


Red Blood Count 6.82 MIL/MM3  6.46 MIL/MM3  6.26 MIL/MM3  6.74 MIL/MM3 


 


Hemoglobin 14.9 GM/DL  14.4 GM/DL  14.3 GM/DL  14.7 GM/DL 


 


Hematocrit 46.9 %  44.8 %  43.3 %  46.6 % 


 


Mean Corpuscular Volume 68.9 FL  69.4 FL  69.2 FL  69.2 FL 


 


Mean Corpuscular Hemoglobin 21.9 PG  22.3 PG  22.9 PG  21.9 PG 


 


Mean Corpuscular Hemoglobin


Concent 31.8 % 


  32.1 % 


  33.1 % 


  31.6 % 


 


 


Red Cell Distribution Width 19.4 %  19.2 %  19.9 %  19.5 % 


 


Platelet Count 1080 TH/MM3  1038 TH/MM3  1146 TH/MM3  1035 TH/MM3 


 


Mean Platelet Volume 8.1 FL  8.5 FL  8.9 FL  8.5 FL 


 


Neutrophils (%) (Auto) 88.8 %  88.0 %  86.6 %  88.9 % 


 


Lymphocytes (%) (Auto) 3.9 %  5.0 %  6.0 %  4.2 % 


 


Monocytes (%) (Auto) 4.0 %  4.3 %  4.0 %  4.0 % 


 


Eosinophils (%) (Auto) 2.2 %  2.5 %  2.5 %  2.4 % 


 


Basophils (%) (Auto) 1.1 %  0.2 %  0.9 %  0.5 % 


 


Neutrophils # (Auto) 20.9 TH/MM3  19.0 TH/MM3  19.4 TH/MM3  19.0 TH/MM3 


 


Lymphocytes # (Auto) 0.9 TH/MM3  1.1 TH/MM3  1.3 TH/MM3  0.9 TH/MM3 


 


Monocytes # (Auto) 0.9 TH/MM3  0.9 TH/MM3  0.9 TH/MM3  0.9 TH/MM3 


 


Eosinophils # (Auto) 0.5 TH/MM3  0.5 TH/MM3  0.6 TH/MM3  0.5 TH/MM3 


 


Basophils # (Auto) 0.3 TH/MM3  0.0 TH/MM3  0.2 TH/MM3  0.1 TH/MM3 


 


CBC Comment AUTO DIFF  AUTO DIFF  AUTO DIFF  AUTO DIFF 


 


Differential Total Cells


Counted 100 


  100 


  100 


  


 


 


Neutrophils % (Manual) 77 %  86 %  75 %  


 


Band Neutrophils % 14 %  3 %  16 %  


 


Lymphocytes % 6 %  4 %  5 %  


 


Monocytes % 2 %  6 %  2 %  


 


Neutrophils # (Manual) 21.6 TH/MM3  19.2 TH/MM3  20.7 TH/MM3  


 


Myelocytes 1 %   1 %  


 


Differential Comment


  FINAL DIFF


MANUAL FINAL DIFF


MANUAL FINAL DIFF


MANUAL 


 


 


Platelet Estimate HIGH  HIGH  HIGH  


 


Platelet Morphology Comment ENLARGED  ENLARGED  NORMAL  


 


Ovalocytes 1+  1+   


 


Erythrocyte Sedimentation Rate 1 mm/hr    


 


C-Reactive Protein 8.93 MG/DL    


 


Basophils %  1 %   


 


Eosinophils %   1 %  


 


Toxic Vacuolation   PRESENT  


 


Blood Urea Nitrogen    8 MG/DL 


 


Creatinine    0.45 MG/DL 


 


Random Glucose    87 MG/DL 


 


Total Protein    5.7 GM/DL 


 


Albumin    2.1 GM/DL 


 


Calcium Level    8.3 MG/DL 


 


Phosphorus Level    2.6 MG/DL 


 


Magnesium Level    2.1 MG/DL 


 


Alkaline Phosphatase    89 U/L 


 


Aspartate Amino Transf


(AST/SGOT) 


  


  


  26 U/L 


 


 


Alanine Aminotransferase


(ALT/SGPT) 


  


  


  13 U/L 


 


 


Total Bilirubin    0.4 MG/DL 


 


Sodium Level    138 MEQ/L 


 


Potassium Level    3.6 MEQ/L 


 


Chloride Level    101 MEQ/L 


 


Carbon Dioxide Level    25.3 MEQ/L 


 


Anion Gap    12 MEQ/L 


 


Estimat Glomerular Filtration


Rate 


  


  


  135 ML/MIN 


 








Imaging





Last Impressions








Lower Extremity CT 1/3/18 1226 Signed





Impressions: 





 Service Date/Time:  Wednesday, January 3, 2018 15:52 - CONCLUSION:  Minimal 

deep 





 hematoma distal anastomosis measuring 3.2 cm x 1.7 cm.     Froilan Salazar MD

  





 FACR


 


Liver Ultrasound 1/2/18 0000 Signed





Impressions: 





 Service Date/Time:  Tuesday, January 2, 2018 20:54 - CONCLUSION:  1. Echogenic 





 right kidney which can be seen with medical renal disease.  2. Minimal 





 nonspecific perinephric fluid. 3. Spleen is at the upper limits of normal in 





 size. 4. No evidence for cholelithiasis.     Johnson Salinas MD 


 


Chest X-Ray 1/1/18 0600 Signed





Impressions: 





 Service Date/Time:  Monday, January 1, 2018 03:17 - CONCLUSION:  New right 

lower 





 lobe infiltrate. Stable interstitial changes.     Mike Dixon Jr., MD 








Objective Remarks


GENERAL: AWAKE ALERT AND SOMEWHAT ORIENTED TO PERSON AND PLACE- FOLLOWS 

COMMANDS FOR ME


SKIN: Warm and dry.


HEAD: Atraumatic. Normocephalic. 


EYES: Pupils equal and round. No scleral icterus. No injection or drainage. EOMI


ENT: No nasal bleeding or discharge.  Mucous membranes pink and moist. TONGUE 

MIDLINE


NECK: Trachea midline. No JVD. SUPPLE 


CARDIOVASCULAR: IRRegular rate and rhythm.  S1, S2 NO S3 OR S4


RESPIRATORY: No accessory muscle use. Clear to auscultation. Breath sounds 

equal bilaterally. 


GASTROINTESTINAL: Abdomen soft, non-tender, nondistended. Hepatic and splenic 

margins not palpable. 


MUSCULOSKELETAL: Extremities without clubbing, cyanosis, or edema. No obvious 

deformities. 


NEUROLOGICAL: Awake and alert. No obvious cranial nerve deficits.  Motor 

grossly within normal limits. 4 out of 5 muscle strength in the arms and legs.  

Normal speech.


PSYCHIATRIC: INAppropriate mood and affect; insight and judgment ABnormal.


Procedures


NONE


Medications and IVs





Current Medications


Amoxicillin/ Clavulanate Potassium (Augmentin) 500 mg BID PO ;  Start 12/28/17 

at 21:00;  Stop 12/29/17 at 10:31;  Status DC


Atorvastatin Calcium (Lipitor) 80 mg HS PO  Last administered on 1/4/18at 21:11

;  Start 12/28/17 at 21:00


Clopidogrel Bisulfate (Plavix) 75 mg DAILY PO  Last administered on 1/3/18at 09:

00;  Start 12/28/17 at 17:30;  Stop 1/3/18 at 14:12;  Status DC


Digoxin (Lanoxin) 0.125 mg DAILY PO  Last administered on 1/4/18at 09:12;  

Start 12/28/17 at 17:30


Metoprolol Tartrate (Lopressor) 25 mg Q12HR PO  Last administered on 1/4/18at 21

:11;  Start 12/28/17 at 21:00


Quetiapine Fumarate (SEROquel) 12.5 mg BID@09,12 PO ;  Start 12/29/17 at 09:00;

  Stop 1/1/18 at 10:24;  Status DC


Acetaminophen (Tylenol) 650 mg Q4H  PRN PO Pain 1-5 or Temp >101F Last 

administered on 1/1/18at 09:17;  Start 12/28/17 at 17:30


Magnesium Hydroxide (Milk Of Magnesia Liq) 30 ml DAILY  PRN PO CONSTIPATION;  

Start 12/28/17 at 17:30


Al Hydrox/Mg Hydrox/Simethicone (Mag-Al Plus Susp Liq) 30 ml Q6H  PRN PO 

DYSPEPSIA;  Start 12/28/17 at 17:30


Hydroxyzine HCl (Atarax) 50 mg Q6H  PRN PO ANXIETY Last administered on 1/4/ 18at 21:10;  Start 12/29/17 at 09:15


Fluconazole (Diflucan) 150 mg ONCE  ONCE PO  Last administered on 12/29/17at 11:

01;  Start 12/29/17 at 11:00;  Stop 12/29/17 at 11:01;  Status DC


Ceftriaxone Sodium 1000 mg/ Sodium Chloride 100 ml @  200 mls/hr Q24H IV  Last 

administered on 1/4/18at 11:00;  Start 12/29/17 at 11:00


Miscellaneous (Pill Splitter) 1 ea UNSCH  PRN OTHER SEE LABEL COMMENTS;  Start 

12/29/17 at 10:30


Metoclopramide HCl (Reglan) 10 mg ACHS  PRN PO Digestive Aid;  Start 12/29/17 

at 13:30;  Status Future Hold


Ondansetron HCl (Zofran  Odt) 4 mg Q6H  PRN PO nausea;  Start 12/29/17 at 13:30

;  Status Future Hold


Albuterol/ Ipratropium (Duoneb Neb) 1 ampule Q4HR NEB  PRN NEB wheezing Last 

administered on 1/3/18at 20:03;  Start 12/29/17 at 13:45


Methylprednisolone Sodium Succinate (SoluMEDROL INJ) 125 mg ONCE  ONCE IV PUSH  

Last administered on 12/29/17at 13:57;  Start 12/29/17 at 14:00;  Stop 12/29/17 

at 14:01;  Status DC


Furosemide (Lasix Inj) 40 mg ONCE  ONCE IV PUSH  Last administered on 12/29/ 17at 14:33;  Start 12/29/17 at 14:15;  Stop 12/29/17 at 14:16;  Status DC


Clonidine (Catapres) 0.1 mg Q6H  PRN PO SBP > 160 Last administered on 12/29/ 17at 18:14;  Start 12/29/17 at 17:30


Furosemide (Lasix Inj) 20 mg DAILY IV PUSH  Last administered on 12/30/17at 09:

00;  Start 12/30/17 at 09:00;  Stop 12/31/17 at 09:43;  Status DC


Potassium Chloride (KCl) 10 meq DAILY PO  Last administered on 12/30/17at 09:00

;  Start 12/30/17 at 09:00;  Stop 12/31/17 at 09:43;  Status DC


Albuterol/ Ipratropium (Duoneb Neb) 1 ampule Q6HR WHILE AWAKE  NEB NEB  Last 

administered on 1/3/18at 13:55;  Start 12/30/17 at 20:00;  Stop 1/3/18 at 19:59

;  Status DC


Azithromycin (Zithromax) 250 mg DAILY PO ;  Start 12/31/17 at 09:00;  Stop 12/31 /17 at 09:00;  Status DC


Azithromycin (Zithromax) 500 mg ONCE  ONCE PO ;  Start 12/30/17 at 15:15;  Stop 

12/30/17 at 15:15;  Status DC


Furosemide (Lasix Inj) 20 mg DAILY IV PUSH  Last administered on 1/2/18at 09:00

;  Start 12/30/17 at 15:15;  Stop 1/2/18 at 15:14;  Status DC


Potassium Chloride (KCl) 10 meq DAILY PO  Last administered on 1/2/18at 11:56;  

Start 12/30/17 at 15:15;  Stop 1/2/18 at 15:14;  Status DC


Amoxicillin/ Clavulanate Potassium (Augmentin) 500 mg Q12HR PO  Last 

administered on 12/30/17at 21:43;  Start 12/30/17 at 21:00;  Stop 12/31/17 at 08

:06;  Status DC


Fluconazole (Diflucan) 200 mg DAILY PO  Last administered on 1/2/18at 11:56;  

Start 12/31/17 at 09:00;  Stop 1/3/18 at 08:59;  Status DC


Quetiapine Fumarate (SEROquel) 25 mg BID@09,12 PO ;  Start 1/1/18 at 12:00;  

Stop 1/2/18 at 10:16;  Status DC


Quetiapine Fumarate (SEROquel) 25 mg TID@0800,1400,2000 PO  Last administered 

on 1/4/18at 14:00;  Start 1/2/18 at 14:00


Enoxaparin Sodium (Lovenox Inj) 30 mg Q24H SQ  Last administered on 1/3/18at 09:

00;  Start 1/3/18 at 09:00;  Stop 1/3/18 at 14:12;  Status DC


Aspirin (Aspirin Chew) 81 mg DAILY CHEW  Last administered on 1/4/18at 09:12;  

Start 1/4/18 at 09:00


Apixaban (Eliquis) 5 mg BID PO  Last administered on 1/4/18at 09:12;  Start 1/3/

18 at 21:00


Escitalopram Oxalate (Lexapro) 10 mg DAILY PO ;  Start 1/4/18 at 15:45





A/P


Problem List:  


(1) History of endarterectomy


ICD Code:  Z98.890 - Other specified postprocedural states


(2) Dyspnea


ICD Code:  R06.00 - Dyspnea, unspecified


(3) Pain


ICD Code:  R52 - Pain, unspecified


(4) Thrombocytosis


ICD Code:  D47.3 - Essential (hemorrhagic) thrombocythemia


Status:  Chronic


(5) UTI (urinary tract infection)


ICD Code:  N39.0 - Urinary tract infection, site not specified


(6) Peripheral vascular disease


ICD Code:  I73.9 - Peripheral vascular disease, unspecified


Status:  Acute


Assessment and Plan


Assessment and Plan


77 years old female who was admitted to medical service from December 17, 2017 

to December 28, 2017.


She was found to have severe PAD requiring right femoropopliteal bypass and 

femoral endarterectomy during that hospitalization.


Course was complicated by UTI and pneumonia.


Patient reports no primary care doctor in no follow-up with no medications 

prior to this hospitalization.  Nursing staff reported patient has no family 

members that they were able to reach out to as well.





Impression/plan:








Suprapubic tenderness on 1/1/18


bladder scan showing >900cc


munoz inserted 


likely from not ambulating


will dc munoz today 1/3/18 to give her trial of void- if retain again, then 

will dc to Rehab/ NH with munoz - fu with urology as outpatient


CONTINUE DIFLUCAN 200MG DAILY








Dementia.   management per psychiatry.


UTI.  Candida growing in cultures.  On Diflucan CONTINUE


Pneumonia.   Asymptomatic at present.  On Rocephin started 12/29/17. Will dc 

tomorrow 1/4/18 to complete 7 days course. 





Leukocytosis/thrombocytosis/erythrocytosis


Patient denies any prior history of this.  Previous admission in early December 

reveals was erythrocytosis in a smoker.


Patient has history of CVA, severe PAD, A. fib.  High risk for clot formations.


hematology was consulted, recommendations reviewed- will fu workup- need 

outpatient hematology follow up 


CONTINUE ON ELIQUIS 5MG PO BID








S/P Fem/Pop Bypass


   - 12/22/17 exploration of the left groin, left common femoral and external 

iliac artery. Endarterectomy and patch angioplasty. External and common artery 

thromboembolectomy, superficial femoral popliteal artery thromboembolectomy, 

arteriogram.


   - 12/26/17 Right external iliac-common femoral endarterectomy and patch 

angioplasty and right femoral-popliteal bypass, PTFE graft


- pt seems to have more pain on exam today- nursing order written to f/u with 

vascular sx on discharge planning care and to report of pt's symptoms- will 

also obtain CT RLE to make sure no hematoma/ post op complication


CONTINUE ON ELIQUIS 5MG PO BID





Atrial Fibrillation.  Rate controlled.  Anticoagulation needs to be discussed.  

However per nursing staff, patient is a very high risk for falls and she did in 

fact fell while in hospital on December 29, 2017.  Currently patient is on 

Plavix for severe PAD.


Based on her discharge planning and risk of falls, we will reconsider 

anticoagulation.


At present high risk of falls due to pt's prolonged hospitalization/ recent 

surgery and not ambulating. However, discussed with psychiatry team- pt is 

planned for dc to Rehab or NH- thus would be able to anticoagulate under 

monitored setting- I do think that pt is a candidate for anticoagulation. 


Was seen by cardiology during this hospitalization.  Due to abnormal EKG prior 

to the OR by vascular surgeon for femoropopliteal, cardiology clearance was 

obtained.


CARDIOLOGY WANTS ON ANTICOAGULATION


CONTINUE ON ELIQUIS 5MG PO BID








HTN - controlled


Hyperlipidemia


   - Abnormal EKG in inpatient with abnormal stress test and subsequent cardia 

cath was done?


   - Continue with ELIQUIS 5MG BID, metoprolol 25 mg by mouth every 12 hours, 

atorvastatin 80 mg daily at bedtime, digoxin 0.125 mg daily ASA 81MG PO DAILY








DVT prop - on ELIQUIS 5MG BID


Discharge Planning


PENDING PSYCHIATRY PLACEMENT











Froilan Marrufo DO Jan 5, 2018 09:35

## 2018-01-06 VITALS
RESPIRATION RATE: 16 BRPM | SYSTOLIC BLOOD PRESSURE: 131 MMHG | DIASTOLIC BLOOD PRESSURE: 63 MMHG | OXYGEN SATURATION: 94 % | TEMPERATURE: 97.9 F | HEART RATE: 73 BPM

## 2018-01-06 VITALS
OXYGEN SATURATION: 95 % | RESPIRATION RATE: 14 BRPM | DIASTOLIC BLOOD PRESSURE: 67 MMHG | TEMPERATURE: 98.5 F | HEART RATE: 76 BPM | SYSTOLIC BLOOD PRESSURE: 133 MMHG

## 2018-01-06 LAB
ACANTHOCYTES BLD QL SMEAR: (no result)
BASOPHILS # BLD AUTO: 0.2 TH/MM3 (ref 0–0.2)
BASOPHILS NFR BLD AUTO: 1 % (ref 0–2)
BASOPHILS NFR BLD: 1 % (ref 0–2)
EOSINOPHIL # BLD: 0.5 TH/MM3 (ref 0–0.4)
EOSINOPHIL NFR BLD: 2.2 % (ref 0–4)
ERYTHROCYTE [DISTWIDTH] IN BLOOD BY AUTOMATED COUNT: 19.8 % (ref 11.6–17.2)
HCT VFR BLD CALC: 45.4 % (ref 35–46)
HGB BLD-MCNC: 14.6 GM/DL (ref 11.6–15.3)
LYMPHOCYTES # BLD AUTO: 0.8 TH/MM3 (ref 1–4.8)
LYMPHOCYTES NFR BLD AUTO: 3.8 % (ref 9–44)
LYMPHOCYTES: 1 % (ref 9–44)
MCH RBC QN AUTO: 22.2 PG (ref 27–34)
MCHC RBC AUTO-ENTMCNC: 32.1 % (ref 32–36)
MCV RBC AUTO: 69.2 FL (ref 80–100)
MONOCYTE #: 0.9 TH/MM3 (ref 0–0.9)
MONOCYTES NFR BLD: 4.3 % (ref 0–8)
MONOCYTES: 2 % (ref 0–8)
MYELOCYTES NFR BLD: 1 % (ref 0–0)
NEUTROPHILS # BLD AUTO: 19.6 TH/MM3 (ref 1.8–7.7)
NEUTROPHILS NFR BLD AUTO: 88.7 % (ref 16–70)
NEUTS BAND # BLD MANUAL: 20.8 TH/MM3 (ref 1.8–7.7)
NEUTS BAND NFR BLD: 9 % (ref 0–6)
NEUTS SEG NFR BLD MANUAL: 84 % (ref 16–70)
OVALOCYTES BLD QL SMEAR: (no result)
PLATELET # BLD: 1075 TH/MM3 (ref 150–450)
PMV BLD AUTO: 8.6 FL (ref 7–11)
RBC # BLD AUTO: 6.55 MIL/MM3 (ref 4–5.3)
WBC # BLD AUTO: 22.1 TH/MM3 (ref 4–11)

## 2018-01-06 RX ADMIN — ATORVASTATIN CALCIUM SCH MG: 80 TABLET, FILM COATED ORAL at 21:32

## 2018-01-06 RX ADMIN — SODIUM CHLORIDE SCH MLS/HR: 900 INJECTION INTRAVENOUS at 11:00

## 2018-01-06 RX ADMIN — APIXABAN SCH MG: 5 TABLET, FILM COATED ORAL at 09:00

## 2018-01-06 RX ADMIN — APIXABAN SCH MG: 5 TABLET, FILM COATED ORAL at 21:32

## 2018-01-06 RX ADMIN — METOPROLOL TARTRATE SCH MG: 25 TABLET, FILM COATED ORAL at 21:32

## 2018-01-06 RX ADMIN — ESCITALOPRAM OXALATE SCH MG: 10 TABLET, FILM COATED ORAL at 09:00

## 2018-01-06 RX ADMIN — DIGOXIN SCH MG: 125 TABLET ORAL at 09:00

## 2018-01-06 RX ADMIN — ASPIRIN 81 MG SCH MG: 81 TABLET ORAL at 09:00

## 2018-01-06 RX ADMIN — FLUCONAZOLE SCH MG: 200 TABLET ORAL at 09:00

## 2018-01-06 RX ADMIN — METOPROLOL TARTRATE SCH MG: 25 TABLET, FILM COATED ORAL at 09:00

## 2018-01-06 NOTE — HHI.PR
Subjective


Remarks


Patient in bed appears in nad. Says she is not eating much. No chest pain or 

sob. No suprapubic pain.No fever or chills. No n/v/d/c. Has a munoz now .





Objective


Vitals





Vital Signs








  Date Time  Temp Pulse Resp B/P (MAP) Pulse Ox O2 Delivery O2 Flow Rate FiO2


 


1/6/18 05:42 98.5 76 14 133/67 (89) 95   


 


1/5/18 18:00 97.8 82 17 137/75 (95) 94   














I/O      


 


 1/5/18 1/5/18 1/5/18 1/6/18 1/6/18 1/6/18





 07:00 15:00 23:00 07:00 15:00 23:00


 


Intake Total 0 ml 240 ml 240 ml   


 


Balance 0 ml 240 ml 240 ml   


 


      


 


Intake Oral 0 ml 240 ml 240 ml   


 


Bladder Scan Volume Amount    801 ml  


 


# Voids 0     








Result Diagram:  


1/5/18 0735                                                                    

            1/5/18 0735





Imaging





Last Impressions








Lower Extremity CT 1/3/18 1226 Signed





Impressions: 





 Service Date/Time:  Wednesday, January 3, 2018 15:52 - CONCLUSION:  Minimal 

deep 





 hematoma distal anastomosis measuring 3.2 cm x 1.7 cm.     Froilan Salazar MD

  





 FACR


 


Liver Ultrasound 1/2/18 0000 Signed





Impressions: 





 Service Date/Time:  Tuesday, January 2, 2018 20:54 - CONCLUSION:  1. Echogenic 





 right kidney which can be seen with medical renal disease.  2. Minimal 





 nonspecific perinephric fluid. 3. Spleen is at the upper limits of normal in 





 size. 4. No evidence for cholelithiasis.     Johnson Salinas MD 


 


Chest X-Ray 1/1/18 0600 Signed





Impressions: 





 Service Date/Time:  Monday, January 1, 2018 03:17 - CONCLUSION:  New right 

lower 





 lobe infiltrate. Stable interstitial changes.     Mike Dixon Jr., MD 








Objective Remarks


GENERAL: Awake and alert. 


CARDIOVASCULAR: Irregular rate and rhythm.  S1, S2 NO S3 OR S4


RESPIRATORY: No accessory muscle use. Clear to auscultation. Breath sounds 

equal bilaterally. 


GASTROINTESTINAL: Abdomen soft, non-tender, nondistended. Hepatic and splenic 

margins not palpable. 


MUSCULOSKELETAL: Extremities without clubbing, cyanosis, or edema. No obvious 

deformities. 


NEUROLOGICAL: Awake and alert. No obvious cranial nerve deficits.  Motor 

grossly within normal limits. 4 out of 5 muscle strength in the arms and legs.  

Normal speech.


PSYCHIATRIC: Inappropriate mood and affect; insight and judgment abnormal.





Procedures


NONE





A/P


Problem List:  


(1) History of endarterectomy


ICD Code:  Z98.890 - Other specified postprocedural states


(2) Dyspnea


ICD Code:  R06.00 - Dyspnea, unspecified


(3) Pain


ICD Code:  R52 - Pain, unspecified


(4) Thrombocytosis


ICD Code:  D47.3 - Essential (hemorrhagic) thrombocythemia


Status:  Chronic


(5) UTI (urinary tract infection)


ICD Code:  N39.0 - Urinary tract infection, site not specified


(6) Peripheral vascular disease


ICD Code:  I73.9 - Peripheral vascular disease, unspecified


Status:  Acute


Assessment and Plan


77 years old female who was admitted to medical service from December 17, 2017 

to December 28, 2017.


She was found to have severe PAD requiring right femoropopliteal bypass and 

femoral endarterectomy during that hospitalization.


Course was complicated by UTI and pneumonia.


Patient reports no primary care doctor in no follow-up with no medications 

prior to this hospitalization.  Nursing staff reported patient has no family 

members that they were able to reach out to as well.











Suprapubic tenderness on 1/1/18


bladder scan showing >900cc


munoz inserted , monitor UOP


likely from not ambulating


will dc munoz today 1/3/18 to give her trial of void- if retain again, then 

will dc to Rehab/ NH with munoz - fu with urology as outpatient


CONTINUE DIFLUCAN 200MG DAILY








Dementia.   management per psychiatry.


UTI.  Candida growing in cultures.  On Diflucan CONTINUE


Pneumonia.   Asymptomatic at present.  On Rocephin started 12/29/17. Will dc 

tomorrow 1/4/18 to complete 7 days course. 





Leukocytosis/thrombocytosis/erythrocytosis


Patient denies any prior history of this.  Previous admission in early December 

reveals was erythrocytosis in a smoker.


Patient has history of CVA, severe PAD, A. fib.  High risk for clot formations.


hematology was consulted, recommendations reviewed- will fu workup- need 

outpatient hematology follow up 


CONTINUE ON ELIQUIS 5MG PO BID








S/P Fem/Pop Bypass


   - 12/22/17 exploration of the left groin, left common femoral and external 

iliac artery. Endarterectomy and patch angioplasty. External and common artery 

thromboembolectomy, superficial femoral popliteal artery thromboembolectomy, 

arteriogram.


   - 12/26/17 Right external iliac-common femoral endarterectomy and patch 

angioplasty and right femoral-popliteal bypass, PTFE graft


- pt seems to have more pain on exam today- nursing order written to f/u with 

vascular sx on discharge planning care and to report of pt's symptoms- will 

also obtain CT RLE to make sure no hematoma/ post op complication


CONTINUE ON ELIQUIS 5MG PO BID





Atrial Fibrillation.  Rate controlled.  Anticoagulation needs to be discussed.  

However per nursing staff, patient is a very high risk for falls and she did in 

fact fell while in hospital on December 29, 2017.  Currently patient is on 

Plavix for severe PAD.


Based on her discharge planning and risk of falls, we will reconsider 

anticoagulation.


At present high risk of falls due to pt's prolonged hospitalization/ recent 

surgery and not ambulating. However, discussed with psychiatry team- pt is 

planned for dc to Rehab or NH- thus would be able to anticoagulate under 

monitored setting- I do think that pt is a candidate for anticoagulation. 


Was seen by cardiology during this hospitalization.  Due to abnormal EKG prior 

to the OR by vascular surgeon for femoropopliteal, cardiology clearance was 

obtained.


CARDIOLOGY WANTS ON ANTICOAGULATION


CONTINUE ON ELIQUIS 5MG PO BID








HTN - controlled


Hyperlipidemia


   - Abnormal EKG in inpatient with abnormal stress test and subsequent cardia 

cath was done?


   - Continue with ELIQUIS 5MG BID, metoprolol 25 mg by mouth every 12 hours, 

atorvastatin 80 mg daily at bedtime, digoxin 0.125 mg daily ASA 81MG PO DAILY








DVT prop - on ELIQUIS 5MG BID


Discharge Planning


per psych, pending placement











Camelia Boykin MD Jan 6, 2018 08:08

## 2018-01-06 NOTE — HHI.PYPN
Subjective


Remarks


No changes





Review of Systems


ROS Limitations:  Clinical Condition


Except as stated in HPI:  all other systems reviewed are Neg





Mental Status Examination


Appearance:  Appropriate


Consciousness:  Alert


Orientation:  Person, Place, Situation (knows why she is in the hospital)


Motor Activity:  Other (patient bedridden at this time)


Speech:  Hesitant


Language:  Adequate


Fund of Knowledge:  Inadequate


Attention and Concentration:  Other (poor)


Memory:  Impaired


Mood:  Other (euthymic to somewhat restricted)


Affect:  Other (decreased range and intensity)


Thought Process & Associations:  Linear


Thought Content:  Other (disorganized)


Hallucination Type:  None (denies)


Delusion Type:  None


Suicidal Ideation:  No


Suicidal Plan:  No


Suicidal Intention:  No


Homicidal Ideation:  No


Homicidal Plan:  No


Homicidal Intention:  No


Insight:  Poor


Judgment:  Poor





Results


Labs











Test


  1/6/18


07:40


 


White Blood Count 22.1 TH/MM3 


 


Red Blood Count 6.55 MIL/MM3 


 


Hemoglobin 14.6 GM/DL 


 


Hematocrit 45.4 % 


 


Mean Corpuscular Volume 69.2 FL 


 


Mean Corpuscular Hemoglobin 22.2 PG 


 


Mean Corpuscular Hemoglobin


Concent 32.1 % 


 


 


Red Cell Distribution Width 19.8 % 


 


Platelet Count 1075 TH/MM3 


 


Mean Platelet Volume 8.6 FL 


 


Neutrophils (%) (Auto) 88.7 % 


 


Lymphocytes (%) (Auto) 3.8 % 


 


Monocytes (%) (Auto) 4.3 % 


 


Eosinophils (%) (Auto) 2.2 % 


 


Basophils (%) (Auto) 1.0 % 


 


Neutrophils # (Auto) 19.6 TH/MM3 


 


Lymphocytes # (Auto) 0.8 TH/MM3 


 


Monocytes # (Auto) 0.9 TH/MM3 


 


Eosinophils # (Auto) 0.5 TH/MM3 


 


Basophils # (Auto) 0.2 TH/MM3 


 


CBC Comment AUTO DIFF 


 


Differential Total Cells


Counted 100 


 


 


Neutrophils % (Manual) 84 % 


 


Band Neutrophils % 9 % 


 


Lymphocytes % 1 % 


 


Monocytes % 2 % 


 


Eosinophils % 2 % 


 


Basophils % 1 % 


 


Neutrophils # (Manual) 20.8 TH/MM3 


 


Myelocytes 1 % 


 


Differential Comment


  FINAL DIFF


MANUAL


 


Platelet Estimate HIGH 


 


Platelet Morphology Comment ENLARGED 


 


Ovalocytes 1+ 


 


Acanthocytes OCC 














 Date/Time


Source Procedure


Growth Status


 


 


 12/29/17 11:50


Urine Other Urine Culture - Final


Candida Albicans Complete








Vitals/IOs





Vital Signs








  Date Time  Temp Pulse Resp B/P (MAP) Pulse Ox O2 Delivery O2 Flow Rate FiO2


 


1/6/18 05:42 98.5 76 14 133/67 (89) 95   


 


1/4/18 08:33        21


 


1/3/18 10:10      Nasal Cannula 2.00 














Intake and Output   


 


 1/6/18 1/6/18 1/7/18





 08:00 16:00 00:00


 


Intake Total 240 ml  


 


Balance 240 ml  











Assessment & Plan


Problem List:  


(1) OTHER ALZHEIMER'S DISEASE


ICD Codes:  G30.8 - OTHER ALZHEIMER'S DISEASE


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 

disturbance


ICD Codes:  F02.80 - Dementia in other diseases classified elsewhere without 

behavioral disturbance


Assessment & Plan


Estimated LOS:  days.  Continue to monitor for effectiveness of treatment plan.


Justification for Cont. Inpt.


Likely to decompensate at lower level of care


Request HC Surrog/Guard Advoc?:  Yes











Low Moon MD Jan 6, 2018 13:20

## 2018-01-07 VITALS
HEART RATE: 111 BPM | TEMPERATURE: 98.2 F | SYSTOLIC BLOOD PRESSURE: 93 MMHG | OXYGEN SATURATION: 95 % | RESPIRATION RATE: 18 BRPM | DIASTOLIC BLOOD PRESSURE: 54 MMHG

## 2018-01-07 VITALS
TEMPERATURE: 97.5 F | RESPIRATION RATE: 16 BRPM | SYSTOLIC BLOOD PRESSURE: 142 MMHG | OXYGEN SATURATION: 93 % | DIASTOLIC BLOOD PRESSURE: 82 MMHG | HEART RATE: 75 BPM

## 2018-01-07 VITALS
OXYGEN SATURATION: 95 % | HEART RATE: 73 BPM | DIASTOLIC BLOOD PRESSURE: 74 MMHG | RESPIRATION RATE: 16 BRPM | TEMPERATURE: 97.5 F | SYSTOLIC BLOOD PRESSURE: 150 MMHG

## 2018-01-07 RX ADMIN — ASPIRIN 81 MG SCH MG: 81 TABLET ORAL at 10:10

## 2018-01-07 RX ADMIN — APIXABAN SCH MG: 5 TABLET, FILM COATED ORAL at 21:21

## 2018-01-07 RX ADMIN — ATORVASTATIN CALCIUM SCH MG: 80 TABLET, FILM COATED ORAL at 21:21

## 2018-01-07 RX ADMIN — SODIUM CHLORIDE SCH MLS/HR: 900 INJECTION INTRAVENOUS at 11:00

## 2018-01-07 RX ADMIN — METOPROLOL TARTRATE SCH MG: 25 TABLET, FILM COATED ORAL at 10:09

## 2018-01-07 RX ADMIN — ESCITALOPRAM OXALATE SCH MG: 10 TABLET, FILM COATED ORAL at 10:09

## 2018-01-07 RX ADMIN — APIXABAN SCH MG: 5 TABLET, FILM COATED ORAL at 10:09

## 2018-01-07 RX ADMIN — METOPROLOL TARTRATE SCH MG: 25 TABLET, FILM COATED ORAL at 21:21

## 2018-01-07 RX ADMIN — FLUCONAZOLE SCH MG: 200 TABLET ORAL at 10:09

## 2018-01-07 RX ADMIN — IPRATROPIUM BROMIDE AND ALBUTEROL SULFATE SCH AMPULE: .5; 3 SOLUTION RESPIRATORY (INHALATION) at 23:16

## 2018-01-07 RX ADMIN — DIGOXIN SCH MG: 125 TABLET ORAL at 10:09

## 2018-01-07 NOTE — HHI.PR
Subjective


Remarks


Follow-up visit dementia, urinary tract infection, status post femoropopliteal 

bypass.  Patient seen and examined today lying in bed.  Drowsy.  As per nurse, 

patient just had her psych meds.  Patient states she is doing well and goes 

back to sleep.  Denies pain or discomfort.





Objective


Vitals





Vital Signs








  Date Time  Temp Pulse Resp B/P (MAP) Pulse Ox O2 Delivery O2 Flow Rate FiO2


 


1/7/18 07:03 97.5 73 16 150/74 (99) 95   


 


1/7/18 00:00 98.2 111 18 93/54 (67) 95   


 


1/6/18 18:00 97.9 73 16 131/63 (85) 94   














I/O      


 


 1/6/18 1/6/18 1/6/18 1/7/18 1/7/18 1/7/18





 07:00 15:00 23:00 07:00 15:00 23:00


 


Intake Total  600 ml 140 ml 100 ml 360 ml 


 


Output Total 1000 ml  750 ml 400 ml  


 


Balance -1000 ml 600 ml -610 ml -300 ml 360 ml 


 


      


 


Intake Oral  600 ml 140 ml 100 ml 360 ml 


 


Output Urine Total 1000 ml  750 ml 400 ml  


 


Bladder Scan Volume Amount 801 ml     


 


# Voids   1   








Result Diagram:  


1/6/18 0740                                                                    

            1/5/18 0735





Objective Remarks


GENERAL: This is a thin-appearing, well-developed patient, in no apparent 

distress.


SKIN: Warm and dry


HEENT: Normocephalic. Pupils equal round and reactive. Nose without bleeding. 

Airway patent.


NECK: Trachea midline. No JVD. Supple.


CARDIOVASCULAR: Regular rate and rhythm without murmurs, gallops, or rubs. 


RESPIRATORY: No wheezes, rales, or rhonchi.  Diminished bases.


GASTROINTESTINAL: Abdomen soft, non-tender, nondistended. Bowel Sounds 

normoactive x4.


MUSCULOSKELETAL: Extremities without clubbing, cyanosis.  RLE trace edema


NEUROLOGICAL: Awake and alert. Oriented to  place, person. Moves all 

extremities. Normal speech.


Procedures


S/P Fem/Pop Bypass 12/26/17





A/P


Problem List:  


(1) History of endarterectomy


ICD Code:  Z98.890 - Other specified postprocedural states


(2) Dyspnea


ICD Code:  R06.00 - Dyspnea, unspecified


(3) Pain


ICD Code:  R52 - Pain, unspecified


(4) Thrombocytosis


ICD Code:  D47.3 - Essential (hemorrhagic) thrombocythemia


Status:  Chronic


(5) UTI (urinary tract infection)


ICD Code:  N39.0 - Urinary tract infection, site not specified


(6) Peripheral vascular disease


ICD Code:  I73.9 - Peripheral vascular disease, unspecified


Status:  Acute


Assessment and Plan


Patient is a 78 Y/O female who presented to the  hospital 12/17 with 

generalized weakness.  Patient found to have PAD status post right 

femoropopliteal bypass and femoral endarterectomy.  Her hospitalization was 

also complicated with sepsis secondary to UTI and pneumonia.  Patient continued 

to have psychosis and confusion.  She is now admitted to medical psychiatry 

unit for further evaluation.  Consulted for medical management.





Psychosis, Dementia


   - Managed by psychiatry team





Urinary tract infection


Urinary retention


   - Evidence of UTI with Candida.  Repeat UA shows continued Candida.


   - Continue Diflucan until stop date.


   - Patient may DC to skilled nursing facility, rehabilitation center with 

[facility].  Urology as an outpatient if continues to have retention.





Pneumonia


Leukocytosis, thrombocytosis


   - Had history of sepsis pneumonia, UTI


   - Hematology following.  Thinks that leukocytosis and thrombocytosis are 

both reactive secondary to history of sepsis pneumonia, UTI.  Pending JAK2/BCR.


   - Ceftriaxone IV x7 days. Will repeat CXR tomorrow.  Follow up result.


   - DuoNeb's scheduled and when necessary


   - Monitor respiratory status





S/P Fem/Pop Bypass


   - 12/22/17 exploration of the left groin, left common femoral and external 

iliac artery. Endarterectomy and patch angioplasty. External and common artery 

thromboembolectomy, superficial femoral popliteal artery thromboembolectomy, 

arteriogram.


   - 12/26/17 Right external iliac-common femoral endarterectomy and patch 

angioplasty and right femoral-popliteal bypass, PTFE graft


   - CT right lower extremity showed minimal deep hematoma distal anastomosis 

measuring 3.2 cm x 1.7 cm.  As per vascular these are expected postop changes.


   - Recent, wound appears clean, dry, intact, steri-strips in place





Atrial Fibrillation, Rate controlled


HTN, HLD


   - EKG 


   - Abnormal EKG in inpatient with abnormal stress test and subsequent cardiac 

catheterization was done.


   - Continue with aspirin 81 mg daily, metoprolol 25 mg by mouth every 12 hours

, atorvastatin 80 mg daily at bedtime, digoxin 0.125 mg daily


   - On Eliquis 5 mg twice a day


   - Monitor BP trend





DVT prop Eliquis.


Discharge Planning


Recommend to discharged to SNF.











Aileen Rollins Jan 7, 2018 12:05

## 2018-01-08 VITALS
DIASTOLIC BLOOD PRESSURE: 68 MMHG | SYSTOLIC BLOOD PRESSURE: 135 MMHG | HEART RATE: 69 BPM | RESPIRATION RATE: 16 BRPM | OXYGEN SATURATION: 92 % | TEMPERATURE: 98.1 F

## 2018-01-08 VITALS
DIASTOLIC BLOOD PRESSURE: 61 MMHG | RESPIRATION RATE: 16 BRPM | OXYGEN SATURATION: 94 % | HEART RATE: 69 BPM | SYSTOLIC BLOOD PRESSURE: 120 MMHG | TEMPERATURE: 97.9 F

## 2018-01-08 LAB
BASOPHILS # BLD AUTO: 0 TH/MM3 (ref 0–0.2)
BASOPHILS NFR BLD AUTO: 2 % (ref 0–2)
BASOPHILS NFR BLD: 0.2 % (ref 0–2)
BUN SERPL-MCNC: 7 MG/DL (ref 7–18)
CALCIUM SERPL-MCNC: 8.3 MG/DL (ref 8.5–10.1)
CHLORIDE SERPL-SCNC: 101 MEQ/L (ref 98–107)
CREAT SERPL-MCNC: 0.49 MG/DL (ref 0.5–1)
EOSINOPHIL # BLD: 0.5 TH/MM3 (ref 0–0.4)
EOSINOPHIL NFR BLD: 2.5 % (ref 0–4)
ERYTHROCYTE [DISTWIDTH] IN BLOOD BY AUTOMATED COUNT: 19.9 % (ref 11.6–17.2)
GFR SERPLBLD BASED ON 1.73 SQ M-ARVRAT: 123 ML/MIN (ref 89–?)
GLUCOSE SERPL-MCNC: 84 MG/DL (ref 74–106)
HCO3 BLD-SCNC: 24.8 MEQ/L (ref 21–32)
HCT VFR BLD CALC: 42.9 % (ref 35–46)
HGB BLD-MCNC: 14.2 GM/DL (ref 11.6–15.3)
LYMPHOCYTES # BLD AUTO: 1.3 TH/MM3 (ref 1–4.8)
LYMPHOCYTES NFR BLD AUTO: 6.2 % (ref 9–44)
LYMPHOCYTES: 1 % (ref 9–44)
MCH RBC QN AUTO: 22.7 PG (ref 27–34)
MCHC RBC AUTO-ENTMCNC: 33.1 % (ref 32–36)
MCV RBC AUTO: 68.6 FL (ref 80–100)
METAMYELOCYTES NFR BLD: 1 % (ref 0–1)
MONOCYTE #: 0.9 TH/MM3 (ref 0–0.9)
MONOCYTES NFR BLD: 4.3 % (ref 0–8)
MONOCYTES: 3 % (ref 0–8)
NEUTROPHILS # BLD AUTO: 18 TH/MM3 (ref 1.8–7.7)
NEUTROPHILS NFR BLD AUTO: 86.8 % (ref 16–70)
NEUTS BAND # BLD MANUAL: 19.3 TH/MM3 (ref 1.8–7.7)
NEUTS BAND NFR BLD: 7 % (ref 0–6)
NEUTS SEG NFR BLD MANUAL: 85 % (ref 16–70)
PLATELET # BLD: 1128 TH/MM3 (ref 150–450)
PMV BLD AUTO: 8.7 FL (ref 7–11)
RBC # BLD AUTO: 6.26 MIL/MM3 (ref 4–5.3)
SODIUM SERPL-SCNC: 135 MEQ/L (ref 136–145)
WBC # BLD AUTO: 20.7 TH/MM3 (ref 4–11)

## 2018-01-08 RX ADMIN — POTASSIUM CHLORIDE SCH MEQ: 20 TABLET, EXTENDED RELEASE ORAL at 10:42

## 2018-01-08 RX ADMIN — IPRATROPIUM BROMIDE AND ALBUTEROL SULFATE SCH AMPULE: .5; 3 SOLUTION RESPIRATORY (INHALATION) at 07:57

## 2018-01-08 RX ADMIN — HYDROXYZINE HYDROCHLORIDE PRN MG: 50 TABLET, FILM COATED ORAL at 21:46

## 2018-01-08 RX ADMIN — FUROSEMIDE SCH MG: 10 INJECTION, SOLUTION INTRAMUSCULAR; INTRAVENOUS at 17:21

## 2018-01-08 RX ADMIN — FLUCONAZOLE SCH MG: 200 TABLET ORAL at 10:43

## 2018-01-08 RX ADMIN — METOPROLOL TARTRATE SCH MG: 25 TABLET, FILM COATED ORAL at 21:46

## 2018-01-08 RX ADMIN — ESCITALOPRAM OXALATE SCH MG: 10 TABLET, FILM COATED ORAL at 09:00

## 2018-01-08 RX ADMIN — IPRATROPIUM BROMIDE AND ALBUTEROL SULFATE SCH AMPULE: .5; 3 SOLUTION RESPIRATORY (INHALATION) at 20:45

## 2018-01-08 RX ADMIN — ASPIRIN 81 MG SCH MG: 81 TABLET ORAL at 10:43

## 2018-01-08 RX ADMIN — METOPROLOL TARTRATE SCH MG: 25 TABLET, FILM COATED ORAL at 10:42

## 2018-01-08 RX ADMIN — DIGOXIN SCH MG: 125 TABLET ORAL at 10:42

## 2018-01-08 RX ADMIN — APIXABAN SCH MG: 5 TABLET, FILM COATED ORAL at 10:42

## 2018-01-08 RX ADMIN — ATORVASTATIN CALCIUM SCH MG: 80 TABLET, FILM COATED ORAL at 21:46

## 2018-01-08 RX ADMIN — FUROSEMIDE SCH MG: 10 INJECTION, SOLUTION INTRAMUSCULAR; INTRAVENOUS at 09:00

## 2018-01-08 RX ADMIN — IPRATROPIUM BROMIDE AND ALBUTEROL SULFATE SCH AMPULE: .5; 3 SOLUTION RESPIRATORY (INHALATION) at 14:00

## 2018-01-08 RX ADMIN — APIXABAN SCH MG: 5 TABLET, FILM COATED ORAL at 21:46

## 2018-01-08 NOTE — RADRPT
EXAM DATE/TIME:  01/08/2018 03:30 

 

[facility] COMPARISON:     

CHEST SINGLE AP, January 01, 2018, 3:17.

 

                     

INDICATIONS :     

Shortness of breath, possible pulmonary disease.

                     

 

MEDICAL HISTORY :     

Hypercholesterolemia.  Hypertension  Cerebrovascular disease.    PVD A-fib   

 

SURGICAL HISTORY :     

Tonsillectomy. Tubal ligation.  Fem pop

 

ENCOUNTER:     

Subsequent                                        

 

ACUITY:     

1 week      

 

PAIN SCORE:     

0/10

 

LOCATION:     

Bilateral chest 

 

FINDINGS:     

The cardiac silhouette is normal in transverse diameter. There is prominence of the aortic knob is wi
th calcification characteristic of atherosclerotic vascular disease. There is improving pulmonary jayson
maA small right sided effusion is present.

 

CONCLUSION:     

1. Improving pulmonary edema.

 

 

 

 David Jacobo MD on January 08, 2018 at 4:11           

Board Certified Radiologist.

 This report was verified electronically.

## 2018-01-08 NOTE — HHI.PYPN
Subjective


Remarks


Patient seen in her room floor staff, chart reviewed, patient compliant 

medications.  Patient continues calm cooperative with fair eye contact, 

somewhat sad but oriented.  Continues to be willing to look at SNF





Review of Systems


Except as stated in HPI:  all other systems reviewed are Neg





Mental Status Examination


Appearance:  Appropriate


Consciousness:  Alert


Orientation:  Person, Place, Situation (knows why she is in the hospital)


Motor Activity:  Other (patient bedridden at this time)


Speech:  Hesitant


Language:  Adequate


Fund of Knowledge:  Inadequate


Attention and Concentration:  Other (poor)


Memory:  Impaired


Mood:  Other (euthymic to somewhat restricted)


Affect:  Other (decreased range and intensity)


Thought Process & Associations:  Linear


Thought Content:  Other (disorganized)


Hallucination Type:  None (denies)


Delusion Type:  None


Suicidal Ideation:  No


Suicidal Plan:  No


Suicidal Intention:  No


Homicidal Ideation:  No


Homicidal Plan:  No


Homicidal Intention:  No


Insight:  Poor


Judgment:  Poor





Results


Labs











Test


  1/8/18


06:24


 


White Blood Count 20.7 TH/MM3 


 


Red Blood Count 6.26 MIL/MM3 


 


Hemoglobin 14.2 GM/DL 


 


Hematocrit 42.9 % 


 


Mean Corpuscular Volume 68.6 FL 


 


Mean Corpuscular Hemoglobin 22.7 PG 


 


Mean Corpuscular Hemoglobin


Concent 33.1 % 


 


 


Red Cell Distribution Width 19.9 % 


 


Platelet Count 1128 TH/MM3 


 


Mean Platelet Volume 8.7 FL 


 


Neutrophils (%) (Auto) 86.8 % 


 


Lymphocytes (%) (Auto) 6.2 % 


 


Monocytes (%) (Auto) 4.3 % 


 


Eosinophils (%) (Auto) 2.5 % 


 


Basophils (%) (Auto) 0.2 % 


 


Neutrophils # (Auto) 18.0 TH/MM3 


 


Lymphocytes # (Auto) 1.3 TH/MM3 


 


Monocytes # (Auto) 0.9 TH/MM3 


 


Eosinophils # (Auto) 0.5 TH/MM3 


 


Basophils # (Auto) 0.0 TH/MM3 


 


CBC Comment AUTO DIFF 


 


Differential Total Cells


Counted 100 


 


 


Neutrophils % (Manual) 85 % 


 


Band Neutrophils % 7 % 


 


Lymphocytes % 1 % 


 


Monocytes % 3 % 


 


Eosinophils % 1 % 


 


Basophils % 2 % 


 


Neutrophils # (Manual) 19.3 TH/MM3 


 


Metamyelocytes 1 % 


 


Differential Comment


  FINAL DIFF


MANUAL


 


Platelet Estimate HIGH 


 


Platelet Morphology Comment ENLARGED 


 


Red Cell Morphology Comment NORMAL 


 


Blood Urea Nitrogen 7 MG/DL 


 


Creatinine 0.49 MG/DL 


 


Random Glucose 84 MG/DL 


 


Calcium Level 8.3 MG/DL 


 


Sodium Level 135 MEQ/L 


 


Potassium Level 3.4 MEQ/L 


 


Chloride Level 101 MEQ/L 


 


Carbon Dioxide Level 24.8 MEQ/L 


 


Anion Gap 9 MEQ/L 


 


Estimat Glomerular Filtration


Rate 123 ML/MIN 


 














 Date/Time


Source Procedure


Growth Status


 


 


 12/29/17 11:50


Urine Other Urine Culture - Final


Candida Albicans Complete








Vitals/IOs





Vital Signs








  Date Time  Temp Pulse Resp B/P (MAP) Pulse Ox O2 Delivery O2 Flow Rate FiO2


 


1/8/18 06:16 98.1 69 16 135/68 (90) 92   


 


1/8/18 00:14      Room Air  


 


1/4/18 08:33        21














Intake and Output   


 


 1/8/18 1/8/18 1/9/18





 08:00 16:00 00:00


 


Intake Total 120 ml 480 ml 


 


Output Total 500 ml 150 ml 


 


Balance -380 ml 330 ml 











Assessment & Plan


Problem List:  


(1) OTHER ALZHEIMER'S DISEASE


ICD Codes:  G30.8 - OTHER ALZHEIMER'S DISEASE


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 

disturbance


ICD Codes:  F02.80 - Dementia in other diseases classified elsewhere without 

behavioral disturbance


Assessment & Plan


Estimated LOS:  days patient continues calm cooperative is somewhat depressed.  

It appears to cope with medical issues


Justification for Cont. Inpt.


At this time patient decompensated placed in a lower level of care


Discharge Planning


To be determined with medical staff


Request HC Surrog/Guard Advoc?:  Yes











Emir Shultz MD Jan 8, 2018 16:35

## 2018-01-08 NOTE — HHI.PYPN
Subjective


Remarks


Progress note for January 7, 2018.  Patient seen briefly.  No complaints.  

Discuss case with nurse and reviewed electronic medical record.





Review of Systems


ROS Limitations:  Clinical Condition


Except as stated in HPI:  all other systems reviewed are Neg





Mental Status Examination


Appearance:  Appropriate


Consciousness:  Alert


Orientation:  Person, Place, Situation (knows why she is in the hospital)


Motor Activity:  Other (patient bedridden at this time)


Speech:  Hesitant


Language:  Adequate


Fund of Knowledge:  Inadequate


Attention and Concentration:  Other (poor)


Memory:  Impaired


Mood:  Other (euthymic to somewhat restricted)


Affect:  Other (decreased range and intensity)


Thought Process & Associations:  Linear


Thought Content:  Other (disorganized)


Hallucination Type:  None (denies)


Delusion Type:  None


Suicidal Ideation:  No


Suicidal Plan:  No


Suicidal Intention:  No


Homicidal Ideation:  No


Homicidal Plan:  No


Homicidal Intention:  No


Insight:  Poor


Judgment:  Poor





Results


Labs











Test


  1/8/18


06:24


 


White Blood Count 20.7 TH/MM3 


 


Red Blood Count 6.26 MIL/MM3 


 


Hemoglobin 14.2 GM/DL 


 


Hematocrit 42.9 % 


 


Mean Corpuscular Volume 68.6 FL 


 


Mean Corpuscular Hemoglobin 22.7 PG 


 


Mean Corpuscular Hemoglobin


Concent 33.1 % 


 


 


Red Cell Distribution Width 19.9 % 


 


Platelet Count 1128 TH/MM3 


 


Mean Platelet Volume 8.7 FL 


 


Neutrophils (%) (Auto) 86.8 % 


 


Lymphocytes (%) (Auto) 6.2 % 


 


Monocytes (%) (Auto) 4.3 % 


 


Eosinophils (%) (Auto) 2.5 % 


 


Basophils (%) (Auto) 0.2 % 


 


Neutrophils # (Auto) 18.0 TH/MM3 


 


Lymphocytes # (Auto) 1.3 TH/MM3 


 


Monocytes # (Auto) 0.9 TH/MM3 


 


Eosinophils # (Auto) 0.5 TH/MM3 


 


Basophils # (Auto) 0.0 TH/MM3 


 


CBC Comment AUTO DIFF 


 


Differential Total Cells


Counted 100 


 


 


Neutrophils % (Manual) 85 % 


 


Band Neutrophils % 7 % 


 


Lymphocytes % 1 % 


 


Monocytes % 3 % 


 


Eosinophils % 1 % 


 


Basophils % 2 % 


 


Neutrophils # (Manual) 19.3 TH/MM3 


 


Metamyelocytes 1 % 


 


Differential Comment


  FINAL DIFF


MANUAL


 


Platelet Estimate HIGH 


 


Platelet Morphology Comment ENLARGED 


 


Red Cell Morphology Comment NORMAL 


 


Blood Urea Nitrogen 7 MG/DL 


 


Creatinine 0.49 MG/DL 


 


Random Glucose 84 MG/DL 


 


Calcium Level 8.3 MG/DL 


 


Sodium Level 135 MEQ/L 


 


Potassium Level 3.4 MEQ/L 


 


Chloride Level 101 MEQ/L 


 


Carbon Dioxide Level 24.8 MEQ/L 


 


Anion Gap 9 MEQ/L 


 


Estimat Glomerular Filtration


Rate 123 ML/MIN 


 














 Date/Time


Source Procedure


Growth Status


 


 


 12/29/17 11:50


Urine Other Urine Culture - Final


Candida Albicans Complete








Vitals/IOs





Vital Signs








  Date Time  Temp Pulse Resp B/P (MAP) Pulse Ox O2 Delivery O2 Flow Rate FiO2


 


1/8/18 06:16 98.1 69 16 135/68 (90) 92   


 


1/8/18 00:14      Room Air  


 


1/4/18 08:33        21














Intake and Output   


 


 1/8/18 1/8/18 1/9/18





 08:00 16:00 00:00


 


Intake Total 120 ml  


 


Output Total 500 ml  


 


Balance -380 ml  











Assessment & Plan


Problem List:  


(1) OTHER ALZHEIMER'S DISEASE


ICD Codes:  G30.8 - OTHER ALZHEIMER'S DISEASE


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 

disturbance


ICD Codes:  F02.80 - Dementia in other diseases classified elsewhere without 

behavioral disturbance


Assessment & Plan


Estimated LOS:  days.  Continue current treatment plan.


Justification for Cont. Inpt.


Likely to decompensate at lower level of care.


Request HC Surrog/Guard Advoc?:  Yes











Low Moon MD Jan 8, 2018 10:56

## 2018-01-08 NOTE — HHI.PR
Subjective


Remarks


Follow-up visit dementia, pneumonia, urinary tract infection, status post 

femoropopliteal bypass.  Patient seen and examined today.  She states she is 

doing okay. Denies pain and discomfort.  Denies SOB/ dyspnea.  Denies chest pain

, palpitations, headaches, dizziness. Denies fevers, chills, n/v/d.  Denies 

dysuria.





Objective


Vitals





Vital Signs








  Date Time  Temp Pulse Resp B/P (MAP) Pulse Ox O2 Delivery O2 Flow Rate FiO2


 


1/8/18 06:16 98.1 69 16 135/68 (90) 92   


 


1/8/18 00:14      Room Air  


 


1/7/18 18:52 97.5 75 16 142/82 (102) 93   














I/O      


 


 1/7/18 1/7/18 1/7/18 1/8/18 1/8/18 1/8/18





 07:00 15:00 23:00 07:00 15:00 23:00


 


Intake Total 100 ml 360 ml  521 ml  


 


Output Total 400 ml   950 ml  


 


Balance -300 ml 360 ml  -429 ml  


 


      


 


Intake Oral 100 ml 360 ml  521 ml  


 


Output Urine Total 400 ml   950 ml  


 


# Bowel Movements    1  








Result Diagram:  


1/8/18 0624                                                                    

            1/8/18 0624





Objective Remarks


GENERAL: This is a thin-appearing, well-developed patient, in no apparent 

distress.


SKIN: Warm and dry


HEENT: Normocephalic. Pupils equal round and reactive. Nose without bleeding. 

Airway patent.


NECK: Trachea midline. No JVD. Supple.


CARDIOVASCULAR: Regular rate and rhythm without murmurs, gallops, or rubs. 


RESPIRATORY: No wheezes, rales, or rhonchi.  Diminished bases.


GASTROINTESTINAL: Abdomen soft, non-tender, nondistended. Bowel Sounds 

normoactive x4.


MUSCULOSKELETAL: Extremities without clubbing, cyanosis.  RLE trace edema.  

Right inner thigh incision site Steri-Strips intact.


NEUROLOGICAL: Awake and alert. Oriented to  place, person. Moves all 

extremities. Normal speech.


Procedures


S/P Fem/Pop Bypass 12/26/17





A/P


Problem List:  


(1) History of endarterectomy


ICD Code:  Z98.890 - Other specified postprocedural states


(2) Dyspnea


ICD Code:  R06.00 - Dyspnea, unspecified


(3) Pain


ICD Code:  R52 - Pain, unspecified


(4) Thrombocytosis


ICD Code:  D47.3 - Essential (hemorrhagic) thrombocythemia


Status:  Chronic


(5) UTI (urinary tract infection)


ICD Code:  N39.0 - Urinary tract infection, site not specified


(6) Peripheral vascular disease


ICD Code:  I73.9 - Peripheral vascular disease, unspecified


Status:  Acute


Assessment and Plan


Patient is a 76 Y/O female who presented to the  hospital 12/17 with 

generalized weakness.  Patient found to have PAD status post right 

femoropopliteal bypass and femoral endarterectomy.  Her hospitalization was 

also complicated with sepsis secondary to UTI and pneumonia.  Patient continued 

to have psychosis and confusion.  She is now admitted to medical psychiatry 

unit for further evaluation.  Consulted for medical management.





Psychosis, Dementia


   - Managed by psychiatry team





Urinary tract infection


Urinary retention


   - Evidence of UTI with Candida.  Repeat UA shows continued Candida.


   - Continue Diflucan until stop date.


   - Patient may DC to skilled nursing facility, rehabilitation center with 

[facility].  Urology as an outpatient if continues to have retention.





Pneumonia


Leukocytosis, thrombocytosis


   - Had history of sepsis pneumonia, UTI


   - Hematology following.  Thinks that leukocytosis and thrombocytosis are 

both reactive secondary to history of sepsis pneumonia, UTI.  Pending JAK2/BCR.


   - Ceftriaxone IV x7 days completed. 


   - DuoNeb's scheduled and when necessary


   - Repeat chest x-ray showed improving pulmonary edema.  Lasix twice a day 3 

days with KCl supplements.  Monitor electrolytes.


   - Monitor respiratory status





S/P Fem/Pop Bypass


   - 12/22/17 exploration of the left groin, left common femoral and external 

iliac artery. Endarterectomy and patch angioplasty. External and common artery 

thromboembolectomy, superficial femoral popliteal artery thromboembolectomy, 

arteriogram.


   - 12/26/17 Right external iliac-common femoral endarterectomy and patch 

angioplasty and right femoral-popliteal bypass, PTFE graft


   - CT right lower extremity showed minimal deep hematoma distal anastomosis 

measuring 3.2 cm x 1.7 cm.  As per vascular these are expected postop changes.


   - Recent, wound appears clean, dry, intact, steri-strips in place





Atrial Fibrillation, Rate controlled


HTN, HLD


   - Abnormal EKG in inpatient with abnormal stress test and subsequent cardiac 

catheterization was done.


   - Continue with aspirin 81 mg daily, metoprolol 25 mg by mouth every 12 hours

, atorvastatin 80 mg daily at bedtime, digoxin 0.125 mg daily


   - On Eliquis 5 mg twice a day.


   - Monitor BP trend





DVT prop Eliquis.


Discharge Planning


Recommend to discharged to SNF.











Aileen Rollins Jan 8, 2018 09:58

## 2018-01-08 NOTE — PD.TTN
Patient Problems


1. Discharge planning


2. Medication compliance


3. Knowledge deficit


4. Lack of coping skills





Progress Toward Goals


Provider Present:  Dr. CHANDLER Shultz


Provider Input:  


01/08/2018; per doctor patient is being monitor with medication and


medical treatment


Nurse(s) Present:  RN


Nurse(s) Input:  


Patient is taking her medicaton with coaching as well as coaching with


medication


Psychiatric Counselors Present:  HOLGER Navarrete


Psych Therapist Input:  


01/08/2018; patient is accepted at [facility] when stable


Group Spec/RT/OT/GARRISON Present:  Adan Miranda OT


Group Spec/RT/OT/GARRISON Input:  


01/08/2018; patient lacks insight or ability to participate with groups or


activities





Documentation


Scribe:  HOLGER Navarrete Sandra LMHC Jan 8, 2018 16:46

## 2018-01-09 VITALS
HEART RATE: 82 BPM | RESPIRATION RATE: 16 BRPM | SYSTOLIC BLOOD PRESSURE: 144 MMHG | OXYGEN SATURATION: 94 % | DIASTOLIC BLOOD PRESSURE: 76 MMHG | TEMPERATURE: 97.8 F

## 2018-01-09 RX ADMIN — POTASSIUM CHLORIDE SCH MEQ: 20 TABLET, EXTENDED RELEASE ORAL at 10:22

## 2018-01-09 RX ADMIN — APIXABAN SCH MG: 5 TABLET, FILM COATED ORAL at 10:23

## 2018-01-09 RX ADMIN — FLUCONAZOLE SCH MG: 200 TABLET ORAL at 10:22

## 2018-01-09 RX ADMIN — DIGOXIN SCH MG: 125 TABLET ORAL at 10:23

## 2018-01-09 RX ADMIN — IPRATROPIUM BROMIDE AND ALBUTEROL SULFATE SCH AMPULE: .5; 3 SOLUTION RESPIRATORY (INHALATION) at 07:46

## 2018-01-09 RX ADMIN — FUROSEMIDE SCH MG: 10 INJECTION, SOLUTION INTRAMUSCULAR; INTRAVENOUS at 09:00

## 2018-01-09 RX ADMIN — ASPIRIN 81 MG SCH MG: 81 TABLET ORAL at 10:23

## 2018-01-09 RX ADMIN — ESCITALOPRAM OXALATE SCH MG: 10 TABLET, FILM COATED ORAL at 10:22

## 2018-01-09 RX ADMIN — METOPROLOL TARTRATE SCH MG: 25 TABLET, FILM COATED ORAL at 10:22

## 2018-01-09 NOTE — HHI.DS
Psychiatry Discharge Summary


Inpatient Psychiatric care?:  Yes


Advance Directive:  No


Reason Not Provided:  Due to Patient Condition


Mental Health AdvanceDirective:  No


Health Care Proxy:  No


Admission


Admission Date


Dec 28, 2017 at 16:37


Admission Diagnosis:  


(1) OTHER ALZHEIMER'S DISEASE


ICD Code:  G30.8 - OTHER ALZHEIMER'S DISEASE


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 

disturbance


ICD Code:  F02.80 - Dementia in other diseases classified elsewhere without 

behavioral disturbance


Brief History


Patient is a 77-year-old white female who was initially brought to the 

emergency department on 12/17/17 under visit #[id number] she was found be 

markedly debilitated, significant peripheral vascular problems necessitating 

femoral-popliteal bypass.  Patient is also seen by psychiatry during that visit 

results he had cognitive issues was unable care for self.  Patient seen by both 

Dr. Mckeon and Dr. Zavlaeta.  Patient was transferred to [facility] for further care 

and attention.  Dr. Zavaleta did initiate a Meyer act dated 12/28/17 dating 

unspecified psychosis that document reviewed and agreed with.  At the present 

time patient laying quietly in her bed on  E. RN present throughout session.  

Patient is alert diffusely confused elderly white female she doesn't know she 

is in the hospital devastated to another batch she does not know the state she 

is vague about the year does not know the month or the holiday.  She states she 

lives with her daughter but does not know where her daughter is she is vague 

about the circumstances leading to her being left on the couch for extended 

period of time in her feces and urine.  She states her daughter lives with her.

  She states she has been  5 or 6 times, has had 5 or 6 children, has 

grandchildren and great-grandchildren but she has no great great grandchildren 

yet because "I aint dead yet".  Patient denies suicidality denies voices or 

visions.  Patient denies any prior psychiatric contact hospitalizations his 

psychotropic medications.  She states she is from [place] and as a younger 

woman worked in "the [employer]".  Patient denies any alcohol use or drug use, 

though she did somewhat shyly acknowledge past use of marijuana, also stated 

she was a cigarette smoker in the past but not in a few months.  She does deny 

any physical or sexual abuse nor her historical accuracy is quite questionable.

  In any event at the present time patient does meet criteria for inpatient 

psychiatric assessment and observation.  I feel she does not have capacity to 

make decisions concerning her care or medications thus I'll do first opinion 

petition supporting Meyer act request second opinion.  We'll also ask for 

healthcare surrogate and guardian advocate.  We will have a continuation of the 

hospitalists care for this lady on our med psych unit.  Patient did have 

palliative care orders will continue that also.  Will have PT and OT assess 

this lady.  Will have counselor attempt to reach patient's family to discuss 

further care and attention and possible placement issues we also need to 

address with family members advanced directives.





Patient was seen today for psychiatric evaluation for second opinion.  She is 

calm, cooperative, but confused and tangential.  Patient reports that she feels 

much better, but is unable to clarify the reason of her hospitalization and 

circumstances that brought her to the hospital.  Patient says that she is okay 

and feels safe here, she doesn't know where she is, she doesn't know the date.  

No agitation or aggressive behavior reported in the last 24 hours.  Patient has 

been compliant with medications, no significant side effects.


Tobacco Use In Past 30 Days:  No Tobacco Past 30 Days


Alcohol Use:  Never


Hospital Course


Patient's hospital course showed her cooperation and compliance with treatment.

  Plans with medication.  She remained quite fragile medically throughout her 

stay with the medicine service being active in her treatment.  Her cognitive 

abilities showed some improvement as she became more trustworthy of the milieu.

  Her sad mood improved.  Her affect focus and processing also did improve 

somewhat.  However we did realize the patient will need extended intense 

skilled nursing facility treatment.  We've had various facilities out.  [facility] was not here yesterday in the felt she would be 

appropriate for their facility.  At this time patient longer meets Baker act 

criteria I will lift the Meyer act.  Will have patient discharged today to that 

facility with continuation of her psychotropic and medical medications to be 

followed up through [place].  Patient also does denies suicidality homicidality 

voices or visions.  Is willing to be transferred to that facility





Results


Blood Pressure


144 / 76





Vital Signs








  Date Time  Temp Pulse Resp B/P (MAP) Pulse Ox O2 Delivery O2 Flow Rate FiO2


 


1/9/18 06:25 97.8 82 16 144/76 (98) 94   


 


1/8/18 00:14      Room Air  











Laboratory Tests








Test


  1/8/18


06:24


 


White Blood Count


  20.7 TH/MM3


(4.0-11.0)


 


Red Blood Count


  6.26 MIL/MM3


(4.00-5.30)


 


Mean Corpuscular Volume


  68.6 FL


(80.0-100.0)


 


Mean Corpuscular Hemoglobin


  22.7 PG


(27.0-34.0)


 


Red Cell Distribution Width


  19.9 %


(11.6-17.2)


 


Platelet Count


  1128 TH/MM3


(150-450)


 


Neutrophils (%) (Auto)


  86.8 %


(16.0-70.0)


 


Lymphocytes (%) (Auto)


  6.2 %


(9.0-44.0)


 


Neutrophils # (Auto)


  18.0 TH/MM3


(1.8-7.7)


 


Eosinophils # (Auto)


  0.5 TH/MM3


(0-0.4)


 


Neutrophils % (Manual) 85 % (16-70) 


 


Band Neutrophils % 7 % (0-6) 


 


Lymphocytes % 1 % (9-44) 


 


Neutrophils # (Manual)


  19.3 TH/MM3


(1.8-7.7)


 


Platelet Estimate HIGH (NORMAL) 


 


Platelet Morphology Comment


  ENLARGED


(NORMAL)


 


Creatinine


  0.49 MG/DL


(0.50-1.00)


 


Calcium Level


  8.3 MG/DL


(8.5-10.1)


 


Sodium Level


  135 MEQ/L


(136-145)


 


Potassium Level


  3.4 MEQ/L


(3.5-5.1)








Summary of Procedures


None done


Imaging





Last Impressions








Chest X-Ray 1/8/18 0600 Signed





Impressions: 





 Service Date/Time:  Monday, January 8, 2018 03:30 - CONCLUSION:  1. Improving 





 pulmonary edema.     David Jacobo MD 


 


Lower Extremity CT 1/3/18 1226 Signed





Impressions: 





 Service Date/Time:  Wednesday, January 3, 2018 15:52 - CONCLUSION:  Minimal 

deep 





 hematoma distal anastomosis measuring 3.2 cm x 1.7 cm.     Froilan Salazar MD

  





 FACR


 


Liver Ultrasound 1/2/18 0000 Signed





Impressions: 





 Service Date/Time:  Tuesday, January 2, 2018 20:54 - CONCLUSION:  1. Echogenic 





 right kidney which can be seen with medical renal disease.  2. Minimal 





 nonspecific perinephric fluid. 3. Spleen is at the upper limits of normal in 





 size. 4. No evidence for cholelithiasis.     Johnson Salinas MD 








Pending results at discharge:  No





Medications


# of Antipsychotic meds at D/C:  1


Approp Antipsych med options


1 - Minimum of three failed multiple trials of monotherapy.


2 - Documented plan to taper to monotherapy due to previous use of multiple 

meds OR cross-taper in progress at D/C.


3 - Documentation of augmentation of Clozapine.


4 - Justification other than those listed in allowable values 1-3, document here

:





Discharge


Discharge Date:  Jan 9, 2018


Discharge Diagnosis:  


(1) Dementia associated with other underlying disease without behavioral 

disturbance


Diagnosis:  Principal


ICD Code:  F02.80 - Dementia in other diseases classified elsewhere without 

behavioral disturbance


(2) OTHER ALZHEIMER'S DISEASE


Diagnosis:  Principal


ICD Code:  G30.8 - OTHER ALZHEIMER'S DISEASE


Pt Condition on Discharge:  Stable


Discharge Disposition:  Discharge to SNF





Discharge Instructions


Diet Instructions:  As Tolerated, No Restrictions


Activities you can perform:  See Additionl Instruction


Other Activity Instructions:  


Per PT/OT evaluation


Scheduled Appointment:  [facility]





Discharge Time


> 30 minutes





Mental Status Examination


Appearance:  Appropriate


Consciousness:  Alert


Orientation:  Person, Place, Situation (knows why she is in the hospital)


Motor Activity:  Other (patient bedridden at this time)


Speech:  Hesitant


Language:  Adequate


Fund of Knowledge:  Inadequate


Attention and Concentration:  Other (poor)


Memory:  Impaired


Mood:  Other (euthymic to somewhat restricted)


Affect:  Other (decreased range and intensity)


Thought Process & Associations:  Linear


Thought Content:  Other (disorganized)


Hallucination Type:  None (denies)


Delusion Type:  None


Suicidal Ideation:  No


Suicidal Plan:  No


Suicidal Intention:  No


Homicidal Ideation:  No


Homicidal Plan:  No


Homicidal Intention:  No


Insight:  Poor


Judgment:  Poor





Discharge/Advance Care Plan


Health Problems:  


(1) OTHER ALZHEIMER'S DISEASE


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 

disturbance


Goals to promote your health


* To prevent worsening of your condition and complications


* To maintain your health at the optimal level


Directions to meet your goals


*** Take your medications as prescribed


***  Follow your dietary instruction


***  Follow activity as directed





***  Keep your appointments as scheduled


***  Take your immunizations and boosters as scheduled


***  If your symptoms worsen call your PCP, if no PCP go to Urgent Care Center 

or Emergency Room ***


***  For 24/7 questions related to your inpatient stay or results of tests 

pending at discharge, please contact Dr. Emir Shultz at [phone]


***  Smoking is Dangerous to Your Health. Avoid second hand smoking ***











Emir Shultz MD Jan 9, 2018 08:52

## 2018-01-09 NOTE — PD.ONC.PN
Subjective


Subjective


Remarks





Delayed note entry. Patient seen at 9 AM at bedside.  


Patient resting comfortably in bed in no distress.  Plan for discharge to 

facility today.





Objective


Data











  Date Time  Temp Pulse Resp B/P (MAP) Pulse Ox O2 Delivery O2 Flow Rate FiO2


 


1/9/18 06:25 97.8 82 16 144/76 (98) 94   














 1/9/18 1/9/18 1/9/18





 06:59 14:59 22:59


 


Intake Total  100 ml 


 


Output Total 1851 ml  


 


Balance -1851 ml 100 ml 








Result Diagram:  


1/8/18 0624 1/8/18 0624





Objective Remarks


GENERAL: thin, chronically ill appearing lady in no distress


SKIN: Warm and dry.


HEAD: Normocephalic.


EYES: No scleral icterus. No injection or drainage. 


RESPIRATORY: No accessory muscle use.


NEUROLOGICAL: No obvious focal deficit. Awake, alert, and oriented x3.


PSYCHIATRIC: poor insight and judgement





Assessment/Plan


Problem List:  


(1) Leukocytosis


ICD Codes:  D72.829 - Elevated white blood cell count, unspecified


Plan:  --likely reactive d/t recent major surgical procedures including 

endarterectomy and bypass surgery as well as urinary tract infection and 

pneumonia. 


--b12/folate WNL


--iron studies show low TIBC, serum iron % saturation and ferritin, usually 

with iron deficiency anemia TIBC is normal or elevated--may indicate a mixed 

picture. 


--Jak2, BCR/ABL pending





(2) Thrombocytosis


ICD Codes:  D47.3 - Essential (hemorrhagic) thrombocythemia


Status:  Chronic


Plan:  --likely reactive d/t recent major surgical procedures including 

endarterectomy and bypass surgery as well as urinary tract infection and 

pneumonia. 


--b12/folate WNL


--iron studies show low TIBC, serum iron % saturation and ferritin, usually 

with iron deficiency anemia TIBC is normal or elevated--may indicate a mixed 

picture. 


--Jak2, BCR/ABL pending. 





Assessment


77-year-old lady admitted in December 2017 with severe peripheral arterial 

disease requiring right femoropopliteal bypass and femoral endarterectomy.  

Postoperative course was complicated by urinary tract infection and pneumonia.  

She also has atrial fibrillation. Hematology consulted for leukocytosis and 

thrombocytosis.


h/o Hyperlipidemia, Hypertension


Plan


1.  Leukocytosis and thrombocytosis:  BCR-ABL mutation not detected, however 

JAK2 mutation is present.  She is currently on apixaban therapy for 

anticoagulation under the management of the cardiology service due to known 

atrial fibrillation.  Will arrange close follow up in clinic for following CBC, 

consideration of bone marrow biopsy.  Patient has poor insight into disease 

process and into medical care.











Esthela Smith MD Jan 9, 2018 18:59

## 2018-01-09 NOTE — HHI.PR
Subjective


Remarks


Follow-up visit dementia, pneumonia, urinary tract infection, status post 

femoropopliteal bypass.  Patient seen and examined today.  She states she is 

doing well. Discuss with patient plan for DC to rehab. Denies pain and 

discomfort.  Denies SOB/ dyspnea.  Denies chest pain, palpitations, headaches, 

dizziness. Denies fevers, chills, n/v/d.  Denies dysuria.





Objective


Vitals





Vital Signs








  Date Time  Temp Pulse Resp B/P (MAP) Pulse Ox O2 Delivery O2 Flow Rate FiO2


 


1/9/18 06:25 97.8 82 16 144/76 (98) 94   


 


1/8/18 18:00 97.9 69 16 120/61 (80) 94   














I/O      


 


 1/8/18 1/8/18 1/8/18 1/9/18 1/9/18 1/9/18





 06:59 14:59 22:59 06:59 14:59 22:59


 


Intake Total 521 ml 120 ml 360 ml  100 ml 


 


Output Total 950 ml  150 ml 1851 ml  


 


Balance -429 ml 120 ml 210 ml -1851 ml 100 ml 


 


      


 


Intake Oral 521 ml 120 ml 360 ml  100 ml 


 


Output Urine Total 950 ml  150 ml 1850 ml  


 


Stool Total    1 ml  


 


# Bowel Movements 1     








Result Diagram:  


1/8/18 0624                                                                    

            1/8/18 0624





Imaging





Last Impressions








Chest X-Ray 1/8/18 0600 Signed





Impressions: 





 Service Date/Time:  Monday, January 8, 2018 03:30 - CONCLUSION:  1. Improving 





 pulmonary edema.     David Jacobo MD 


 


Lower Extremity CT 1/3/18 1226 Signed





Impressions: 





 Service Date/Time:  Wednesday, January 3, 2018 15:52 - CONCLUSION:  Minimal 

deep 





 hematoma distal anastomosis measuring 3.2 cm x 1.7 cm.     Froilan Salazar MD

  





 FACR


 


Liver Ultrasound 1/2/18 0000 Signed





Impressions: 





 Service Date/Time:  Tuesday, January 2, 2018 20:54 - CONCLUSION:  1. Echogenic 





 right kidney which can be seen with medical renal disease.  2. Minimal 





 nonspecific perinephric fluid. 3. Spleen is at the upper limits of normal in 





 size. 4. No evidence for cholelithiasis.     Johnson Salinas MD 








Objective Remarks


GENERAL: This is a thin-appearing, well-developed patient, in no apparent 

distress.


SKIN: Warm and dry


HEENT: Normocephalic. Pupils equal round and reactive. Nose without bleeding. 

Airway patent.


NECK: Trachea midline. No JVD. Supple.


CARDIOVASCULAR: Regular rate and rhythm without murmurs, gallops, or rubs. 


RESPIRATORY: No wheezes, rales, or rhonchi.  Diminished bases.


GASTROINTESTINAL: Abdomen soft, non-tender, nondistended. Bowel Sounds 

normoactive x4.


MUSCULOSKELETAL: Extremities without clubbing, cyanosis.  RLE trace edema.  

Right inner thigh incision site Steri-Strips intact.


NEUROLOGICAL: Awake and alert. Oriented to  place, person. Moves all 

extremities. Normal speech.


Procedures


S/P Fem/Pop Bypass 12/26/17





A/P


Problem List:  


(1) History of endarterectomy


ICD Code:  Z98.890 - Other specified postprocedural states


(2) Dyspnea


ICD Code:  R06.00 - Dyspnea, unspecified


(3) Pain


ICD Code:  R52 - Pain, unspecified


(4) Thrombocytosis


ICD Code:  D47.3 - Essential (hemorrhagic) thrombocythemia


Status:  Chronic


(5) UTI (urinary tract infection)


ICD Code:  N39.0 - Urinary tract infection, site not specified


(6) Peripheral vascular disease


ICD Code:  I73.9 - Peripheral vascular disease, unspecified


Status:  Acute


Assessment and Plan


Patient is a 78 Y/O female who presented to the  hospital 12/17 with 

generalized weakness.  Patient found to have PAD status post right 

femoropopliteal bypass and femoral endarterectomy.  Her hospitalization was 

also complicated with sepsis secondary to UTI and pneumonia.  Patient continued 

to have psychosis and confusion.  She is now admitted to medical psychiatry 

unit for further evaluation.  Consulted for medical management.





Psychosis, Dementia


   - Managed by psychiatry team





Urinary tract infection


Urinary retention


   - Evidence of UTI with Candida.  Repeat UA shows continued Candida.


   - Continue Diflucan until stop date.


   - Patient may DC to skilled nursing facility, rehabilitation center with 

[facility] if urinary retention continues.  Urology as an outpatient if continues to 

have retention.





Pneumonia


Leukocytosis, thrombocytosis


   - Had history of sepsis pneumonia, UTI


   - Hematology following.  Thinks that leukocytosis and thrombocytosis are 

both reactive secondary to history of sepsis pneumonia, UTI.  Pending JAK2/BCR.


   - Ceftriaxone IV x7 days completed. 


   - DuoNeb's scheduled and when necessary


   - Repeat chest x-ray showed improving pulmonary edema.  Patient was given 

lasix.  Will not DC with LAsix.  Follow up with PCP.  


   - Monitor respiratory status





S/P Fem/Pop Bypass


   - 12/22/17 exploration of the left groin, left common femoral and external 

iliac artery. Endarterectomy and patch angioplasty. External and common artery 

thromboembolectomy, superficial femoral popliteal artery thromboembolectomy, 

arteriogram.


   - 12/26/17 Right external iliac-common femoral endarterectomy and patch 

angioplasty and right femoral-popliteal bypass, PTFE graft


   - CT right lower extremity showed minimal deep hematoma distal anastomosis 

measuring 3.2 cm x 1.7 cm.  As per vascular these are expected postop changes.


   - Wound appears clean, dry, intact, steri-strips in place





Atrial Fibrillation, Rate controlled


HTN, HLD


   - Abnormal EKG in inpatient with abnormal stress test and subsequent cardiac 

catheterization was done.


   - Continue with aspirin 81 mg daily, metoprolol 25 mg by mouth every 12 hours

, atorvastatin 80 mg daily at bedtime, digoxin 0.125 mg daily


   - On Eliquis 5 mg twice a day.


   - Monitor BP trend





DVT prop Eliquis.


Discharge Planning


Recommend to discharged to SNF.











Aileen Rollins Jan 9, 2018 10:17

## 2019-01-30 NOTE — PD.CAR.PN
CVT Progress Note


Subjective/Hospital Course:


Referral received


Full consult to follow


Severe for peripheral vascular disease will require reconstruction


Valentin PEREZ


12/19/17


As noted in my consultation this patient has bilateral severe peripheral 

vascular disease


On top of that she is being treated for urosepsis and general decline


Patient has not been in the hospital for 3 days and has gradually improved for 

her urosepsis based on clinical exam level of alertness and diagnostic studies


Despite heparin drip the right leg is looking worse and worse and patient now 

has ischemia that needs attention in the resolution


In the best of worlds I would like to wait another while to do the surgery 

considering patient's other issues but that this point if surgery is not 

performed patient is a very high risk of losing her leg


I have reviewed patient's echocardiogram and studies and she is at this point 

moderate risk for vascular surgery but I believe options a limited and we have 

to go ahead with it.


Therefore patient will be scheduled for common femoral endarterectomy and 

femoropopliteal bypass tomorrow


I explained the risks and benefits of the procedure and all patient is very 

quiet she is clearly awake and alert and understands the discussion


Have tried to reach her daughter however number is apparently disconnected 


12/2017


Patient originally scheduled for surgery today however EKG reveals some 

anterior ischemia in addition to inferior ischemia that was noted on previous 

EKG


In discussion with the anesthesiologist the decision is made to postpone the 

surgery until patient can be worked up cardiac wise in more detail so we get a 

better picture on cardiac risk and possible remedy


Right leg and foot are ischemic and the cyanotic however while this surgery is 

urgent it is not an emergent and cardiac issues trump any other issue right now


We'll consult cardiology for full evaluation possible stress test


Restart heparin


All things equal once cardiac workup completed we'll proceed with vascular 

bypass and reconstruction on the right leg


Objective:





Vital Signs








  Date Time  Temp Pulse Resp B/P (MAP) Pulse Ox O2 Delivery O2 Flow Rate FiO2


 


12/20/17 07:54 97.6 65 20 150/85 (106) 93   


 


12/20/17 04:00 96.4 77 16 127/63 (84) 96   


 


12/20/17 00:00 97.8 80 16 121/62 (81) 98   


 


12/19/17 20:00 98.0 85 16 124/60 (81) 94   


 


12/19/17 16:00 97.3 91 16 129/67 (87) 92   








Labs:





Laboratory Tests








Test


  12/20/17


01:44 12/20/17


10:52


 


White Blood Count


  17.4 TH/MM3


(4.0-11.0) 


 


 


Red Blood Count


  7.78 MIL/MM3


(4.00-5.30) 


 


 


Hemoglobin


  17.1 GM/DL


(11.6-15.3) 


 


 


Hematocrit


  53.2 %


(35.0-46.0) 


 


 


Mean Corpuscular Volume


  68.4 FL


(80.0-100.0) 


 


 


Mean Corpuscular Hemoglobin


  21.9 PG


(27.0-34.0) 


 


 


Mean Corpuscular Hemoglobin


Concent 32.1 %


(32.0-36.0) 


 


 


Red Cell Distribution Width


  18.2 %


(11.6-17.2) 


 


 


Platelet Count


  627 TH/MM3


(150-450) 


 


 


Mean Platelet Volume


  8.9 FL


(7.0-11.0) 


 


 


Neutrophils (%) (Auto)


  82.3 %


(16.0-70.0) 


 


 


Lymphocytes (%) (Auto)


  8.6 %


(9.0-44.0) 


 


 


Monocytes (%) (Auto)


  5.3 %


(0.0-8.0) 


 


 


Eosinophils (%) (Auto)


  3.0 %


(0.0-4.0) 


 


 


Basophils (%) (Auto)


  0.8 %


(0.0-2.0) 


 


 


Neutrophils # (Auto)


  14.3 TH/MM3


(1.8-7.7) 


 


 


Lymphocytes # (Auto)


  1.5 TH/MM3


(1.0-4.8) 


 


 


Monocytes # (Auto)


  0.9 TH/MM3


(0-0.9) 


 


 


Eosinophils # (Auto)


  0.5 TH/MM3


(0-0.4) 


 


 


Basophils # (Auto)


  0.1 TH/MM3


(0-0.2) 


 


 


CBC Comment AUTO DIFF  


 


Differential Total Cells


Counted 100 


  


 


 


Neutrophils % (Manual) 70 % (16-70)  


 


Band Neutrophils % 15 % (0-6)  


 


Lymphocytes % 12 % (9-44)  


 


Monocytes % 1 % (0-8)  


 


Basophils % 1 % (0-2)  


 


Neutrophils # (Manual)


  15.0 TH/MM3


(1.8-7.7) 


 


 


Metamyelocytes 1 % (0-1)  


 


Differential Comment


  FINAL DIFF


MANUAL 


 


 


Platelet Estimate HIGH (NORMAL)  


 


Platelet Morphology Comment


  ENLARGED


(NORMAL) 


 


 


Activated Partial


Thromboplast Time 60.5 SEC


(24.3-30.1) 29.4 SEC


(24.3-30.1)


 


Blood Urea Nitrogen


  12 MG/DL


(7-18) 


 


 


Creatinine


  0.61 MG/DL


(0.50-1.00) 


 


 


Random Glucose


  108 MG/DL


() 


 


 


Total Protein


  5.5 GM/DL


(6.4-8.2) 


 


 


Albumin


  2.2 GM/DL


(3.4-5.0) 


 


 


Calcium Level


  8.5 MG/DL


(8.5-10.1) 


 


 


Phosphorus Level


  3.4 MG/DL


(2.5-4.9) 


 


 


Magnesium Level


  2.0 MG/DL


(1.5-2.5) 


 


 


Alkaline Phosphatase


  71 U/L


() 


 


 


Aspartate Amino Transf


(AST/SGOT) 14 U/L (15-37) 


  


 


 


Alanine Aminotransferase


(ALT/SGPT) 9 U/L (10-53) 


  


 


 


Total Bilirubin


  0.4 MG/DL


(0.2-1.0) 


 


 


Sodium Level


  137 MEQ/L


(136-145) 


 


 


Potassium Level


  3.3 MEQ/L


(3.5-5.1) 


 


 


Chloride Level


  101 MEQ/L


() 


 


 


Carbon Dioxide Level


  29.4 MEQ/L


(21.0-32.0) 


 


 


Anion Gap 7 MEQ/L (5-15)  


 


Estimat Glomerular Filtration


Rate 95 ML/MIN


(>89) 


 








Result Diagram:  


12/20/17 0144                                                                  

              12/20/17 0144














Amber Acosta MD Dec 20, 2017 12:54 Patient received in bed, Aliza SARABIA, to translate for hx. Patient Kyr[place]z speaking. C/O back pain at 6/10 at rest, just medicated for pain Pt received OOB in chair, reporting 8/10 pain, RN giving pain meds, flowtrons +,

## 2022-09-15 NOTE — HHI.PR
History of fracture Subjective


Remarks


denies any symptoms as usual 


today is much more awake, wanted to have her food and asking for coffee 


however, on exam, seems to have pain on her RLE upper thigh down to knees - she 

admantly refused pain but was grimacing on exam - more so than yesterday





Objective


Vitals





Vital Signs








  Date Time  Temp Pulse Resp B/P (MAP) Pulse Ox O2 Delivery O2 Flow Rate FiO2


 


1/3/18 10:10     93 Nasal Cannula 2.00 


 


1/3/18 09:05      Room Air  


 


1/3/18 06:00 97.3 81 16 120/65 (83) 97   


 


1/3/18 02:24     90 Nasal Cannula 2.00 


 


1/2/18 22:43     92 Nasal Cannula 2.00 


 


1/2/18 18:00 97.6 74 18 101/59 (73) 95   














I/O      


 


 1/2/18 1/2/18 1/2/18 1/3/18 1/3/18 1/3/18





 07:00 15:00 23:00 07:00 15:00 23:00


 


Intake Total  480 ml  0 ml 480 ml 


 


Output Total 700 ml 1000 ml  100 ml  


 


Balance -700 ml -520 ml  -100 ml 480 ml 


 


      


 


Intake Oral  480 ml  0 ml 480 ml 


 


Output Urine Total 700 ml 1000 ml  100 ml  








Result Diagram:  


1/3/18 0445                                                                    

            1/1/18 0526





Objective Remarks


GENERAL: This is an elderly lady, awake, wanted to eat, 


SKIN: Warm and dry.  Small superficial ecchymoses.  Right lower extremity 

distal femoral area with surgical steristrips on


HEENT: Normocephalic. . Airway patent.


NECK: Trachea midline. No JVD. Supple.


CARDIOVASCULAR: Regular rate and rhythm without murmurs, gallops, or rubs. 


RESPIRATORY: No wheezes, rales, or rhonchi.  Diminished bases.  Poor 

inspiratory effort


GASTROINTESTINAL: Abdomen soft, non-tender, nondistended.  now with munoz, no 

suprapubic tenderness


MUSCULOSKELETAL: Extremities without clubbing, cyanosis.  RLE trace edema. pain 

worse than yesterday on palpation of right UE and surgical site areas


NEUROLOGICAL: Mostly sleeping through my exam.. Oriented to  place, person. 

Moves all extremities minimally though with pain on right side. Normal speech.





A/P


Problem List:  


(1) History of endarterectomy


ICD Code:  Z98.890 - Other specified postprocedural states


(2) Dyspnea


ICD Code:  R06.00 - Dyspnea, unspecified


(3) Pain


ICD Code:  R52 - Pain, unspecified


(4) Thrombocytosis


ICD Code:  D47.3 - Essential (hemorrhagic) thrombocythemia


Status:  Chronic


(5) UTI (urinary tract infection)


ICD Code:  N39.0 - Urinary tract infection, site not specified


(6) Peripheral vascular disease


ICD Code:  I73.9 - Peripheral vascular disease, unspecified


Status:  Acute


Assessment and Plan


77 years old female who was admitted to medical service from December 17, 2017 

to December 28, 2017.


She was found to have severe PAD requiring right femoropopliteal bypass and 

femoral endarterectomy during that hospitalization.


Course was complicated by UTI and pneumonia.


Patient reports no primary care doctor in no follow-up with no medications 

prior to this hospitalization.  Nursing staff reported patient has no family 

members that they were able to reach out to as well.





Impression/plan:








Suprapubic tenderness on 1/1/18


bladder scan showing >900cc


munoz inserted 


likely from not ambulating


will dc munoz today 1/3/18 to give her trial of void- if retain again, then 

will dc to Rehab/ NH with munoz - fu with urology as outpatient








Dementia.   management per psychiatry.


UTI.  Candida growing in cultures.  On Diflucan.


Pneumonia.   Asymptomatic at present.  On Rocephin started 12/29/17. Will dc 

tomorrow 1/4/18 to complete 7 days course. 





Leukocytosis/thrombocytosis/erythrocytosis


Patient denies any prior history of this.  Previous admission in early December 

reveals was erythrocytosis in a smoker.


Patient has history of CVA, severe PAD, A. fib.  High risk for clot formations.


hematology was consulted, recommendations reviewed- will fu workup- need 

outpatient hematology follow up 








S/P Fem/Pop Bypass


   - 12/22/17 exploration of the left groin, left common femoral and external 

iliac artery. Endarterectomy and patch angioplasty. External and common artery 

thromboembolectomy, superficial femoral popliteal artery thromboembolectomy, 

arteriogram.


   - 12/26/17 Right external iliac-common femoral endarterectomy and patch 

angioplasty and right femoral-popliteal bypass, PTFE graft


- pt seems to have more pain on exam today- nursing order written to f/u with 

vascular sx on discharge planning care and to report of pt's symptoms- will 

also obtain CT RLE to make sure no hematoma/ post op complication





Atrial Fibrillation.  Rate controlled.  Anticoagulation needs to be discussed.  

However per nursing staff, patient is a very high risk for falls and she did in 

fact fell while in hospital on December 29, 2017.  Currently patient is on 

Plavix for severe PAD.


Based on her discharge planning and risk of falls, we will reconsider 

anticoagulation.


At present high risk of falls due to pt's prolonged hospitalization/ recent 

surgery and not ambulating. However, discussed with psychiatry team- pt is 

planned for dc to Rehab or NH- thus would be able to anticoagulate under 

monitored setting- I do think that pt is a candidate for anticoagulation. 


Was seen by cardiology during this hospitalization.  Due to abnormal EKG prior 

to the OR by vascular surgeon for femoropopliteal, cardiology clearance was 

obtained.


thus would obtain pt's cardiologist opinion regarding anticoagulation before 

discharge. Will follow up. 








HTN - controlled


Hyperlipidemia


   - Abnormal EKG in inpatient with abnormal stress test and subsequent cardia 

cath was done?


   - Continue with Plavix 75 mg daily, metoprolol 25 mg by mouth every 12 hours

, atorvastatin 80 mg daily at bedtime, digoxin 0.125 mg daily








DVT prop - on lovenox, early ambulation PT


Discharge Planning


per case management and psychiatry team.











Edson Bhat MD John 3, 2018 12:06